# Patient Record
Sex: MALE | Race: WHITE | NOT HISPANIC OR LATINO | Employment: FULL TIME | URBAN - NONMETROPOLITAN AREA
[De-identification: names, ages, dates, MRNs, and addresses within clinical notes are randomized per-mention and may not be internally consistent; named-entity substitution may affect disease eponyms.]

---

## 2017-04-02 ENCOUNTER — APPOINTMENT (EMERGENCY)
Dept: RADIOLOGY | Facility: HOSPITAL | Age: 22
End: 2017-04-02

## 2017-04-02 ENCOUNTER — HOSPITAL ENCOUNTER (EMERGENCY)
Facility: HOSPITAL | Age: 22
Discharge: HOME | End: 2017-04-02
Attending: EMERGENCY MEDICINE | Admitting: EMERGENCY MEDICINE

## 2017-04-02 VITALS
DIASTOLIC BLOOD PRESSURE: 70 MMHG | HEIGHT: 68 IN | HEART RATE: 86 BPM | OXYGEN SATURATION: 98 % | RESPIRATION RATE: 18 BRPM | SYSTOLIC BLOOD PRESSURE: 136 MMHG | TEMPERATURE: 97.9 F | BODY MASS INDEX: 27.28 KG/M2 | WEIGHT: 180 LBS

## 2017-04-02 DIAGNOSIS — R05.9 COUGH: Primary | ICD-10-CM

## 2017-04-02 DIAGNOSIS — J40 BRONCHITIS: ICD-10-CM

## 2017-04-02 PROCEDURE — 99285 EMERGENCY DEPT VISIT HI MDM: CPT

## 2017-04-02 PROCEDURE — 71020 HB CHEST X-RAY 2VW FRONTAL&LATL: CPT

## 2017-04-02 PROCEDURE — 93005 ELECTROCARDIOGRAM TRACING: CPT | Performed by: EMERGENCY MEDICINE

## 2017-04-02 RX ORDER — PROMETHAZINE HYDROCHLORIDE AND CODEINE PHOSPHATE 6.25; 1 MG/5ML; MG/5ML
5 SYRUP ORAL EVERY 4 HOURS PRN
Qty: 120 ML | Refills: 0 | Status: SHIPPED | OUTPATIENT
Start: 2017-04-02 | End: 2017-04-12

## 2017-04-03 LAB
ATRIAL RATE: 78 BPM
P AXIS: 25 DEGREES
PR INTERVAL: 136 MS
QRS AXIS: 4 DEGREES
QRSD INTERVAL: 92 MS
QT INTERVAL: 352 MS
QTC INTERVAL: 401 MS
T WAVE AXIS: 27 DEGREES
VENTRICULAR RATE: 78 BPM

## 2017-05-14 ENCOUNTER — HOSPITAL ENCOUNTER (EMERGENCY)
Facility: HOSPITAL | Age: 22
Discharge: HOME | End: 2017-05-14
Attending: EMERGENCY MEDICINE | Admitting: EMERGENCY MEDICINE

## 2017-05-14 VITALS
HEART RATE: 89 BPM | BODY MASS INDEX: 26.52 KG/M2 | RESPIRATION RATE: 17 BRPM | WEIGHT: 175 LBS | DIASTOLIC BLOOD PRESSURE: 77 MMHG | SYSTOLIC BLOOD PRESSURE: 118 MMHG | TEMPERATURE: 97.8 F | OXYGEN SATURATION: 99 % | HEIGHT: 68 IN

## 2017-05-14 DIAGNOSIS — M54.9 BACK PAIN: Primary | ICD-10-CM

## 2017-05-14 PROCEDURE — 99283 EMERGENCY DEPT VISIT LOW MDM: CPT

## 2017-05-14 PROCEDURE — 96372 THER/PROPH/DIAG INJ SC/IM: CPT

## 2017-05-14 RX ORDER — CYCLOBENZAPRINE HCL 10 MG
5 TABLET ORAL
Qty: 10 TABLET | Refills: 0 | Status: SHIPPED | OUTPATIENT
Start: 2017-05-14 | End: 2018-05-04

## 2017-05-14 RX ORDER — NAPROXEN 500 MG/1
500 TABLET ORAL 2 TIMES DAILY WITH MEALS
Qty: 60 TABLET | Refills: 0 | Status: SHIPPED | OUTPATIENT
Start: 2017-05-14 | End: 2018-05-04

## 2017-05-14 RX ORDER — KETOROLAC TROMETHAMINE 30 MG/ML
30 INJECTION, SOLUTION INTRAMUSCULAR; INTRAVENOUS ONCE
Status: COMPLETED | OUTPATIENT
Start: 2017-05-14 | End: 2017-05-14

## 2017-05-14 RX ADMIN — KETOROLAC TROMETHAMINE 30 MG: 30 INJECTION, SOLUTION INTRAMUSCULAR at 14:24

## 2017-09-01 ENCOUNTER — HOSPITAL ENCOUNTER (EMERGENCY)
Facility: HOSPITAL | Age: 22
Discharge: HOME | End: 2017-09-01
Admitting: EMERGENCY MEDICINE
Payer: COMMERCIAL

## 2017-09-01 VITALS
HEART RATE: 74 BPM | WEIGHT: 185 LBS | TEMPERATURE: 98.4 F | RESPIRATION RATE: 18 BRPM | HEIGHT: 68 IN | SYSTOLIC BLOOD PRESSURE: 134 MMHG | OXYGEN SATURATION: 98 % | BODY MASS INDEX: 28.04 KG/M2 | DIASTOLIC BLOOD PRESSURE: 86 MMHG

## 2017-09-01 DIAGNOSIS — J02.9 PHARYNGITIS, ACUTE: Primary | ICD-10-CM

## 2017-09-01 PROCEDURE — 99282 EMERGENCY DEPT VISIT SF MDM: CPT

## 2017-09-01 RX ORDER — ONDANSETRON 4 MG/1
4 TABLET, ORALLY DISINTEGRATING ORAL EVERY 8 HOURS PRN
Qty: 30 TABLET | Refills: 0 | Status: SHIPPED | OUTPATIENT
Start: 2017-09-01 | End: 2017-12-16 | Stop reason: ALTCHOICE

## 2017-09-01 RX ORDER — AZITHROMYCIN 250 MG/1
500 TABLET, FILM COATED ORAL ONCE
Status: COMPLETED | OUTPATIENT
Start: 2017-09-01 | End: 2017-09-01

## 2017-09-01 RX ORDER — ONDANSETRON 4 MG/1
4 TABLET, ORALLY DISINTEGRATING ORAL ONCE
Status: COMPLETED | OUTPATIENT
Start: 2017-09-01 | End: 2017-09-01

## 2017-09-01 RX ORDER — NAPROXEN 500 MG/1
500 TABLET ORAL 2 TIMES DAILY WITH MEALS
Qty: 10 TABLET | Refills: 0 | Status: SHIPPED | OUTPATIENT
Start: 2017-09-01 | End: 2019-03-04 | Stop reason: ALTCHOICE

## 2017-09-01 RX ORDER — ECHINACEA PURPUREA EXTRACT 125 MG
1 TABLET ORAL ONCE
Status: DISCONTINUED | OUTPATIENT
Start: 2017-09-01 | End: 2017-09-01 | Stop reason: HOSPADM

## 2017-09-01 RX ORDER — DEXAMETHASONE 4 MG/1
10 TABLET ORAL ONCE
Status: COMPLETED | OUTPATIENT
Start: 2017-09-01 | End: 2017-09-01

## 2017-09-01 RX ORDER — FEXOFENADINE HCL 180 MG/1
180 TABLET ORAL DAILY
COMMUNITY
End: 2022-07-25 | Stop reason: ALTCHOICE

## 2017-09-01 RX ORDER — AZITHROMYCIN 250 MG/1
250 TABLET, FILM COATED ORAL EVERY 24 HOURS
Qty: 4 TABLET | Refills: 0 | Status: SHIPPED | OUTPATIENT
Start: 2017-09-01 | End: 2017-09-05

## 2017-09-01 RX ADMIN — AZITHROMYCIN 500 MG: 250 TABLET, FILM COATED ORAL at 03:19

## 2017-09-01 RX ADMIN — ONDANSETRON 4 MG: 4 TABLET, ORALLY DISINTEGRATING ORAL at 02:47

## 2017-09-01 RX ADMIN — DEXAMETHASONE 10 MG: 4 TABLET ORAL at 03:19

## 2017-10-02 ENCOUNTER — ALLSCRIPTS OFFICE VISIT (OUTPATIENT)
Dept: OTHER | Facility: OTHER | Age: 22
End: 2017-10-02

## 2017-10-27 NOTE — PROGRESS NOTES
Assessment  1  Current every day smoker (305 1) (F17 200)   2  Bronchitis (490) (J40)    Plan  Bronchitis    · DrRx Zithromax Z-Luiz 250 MG #6 pill pack; Take 2 pills on day 1, take 1 pill on  days 2-5  Increased BMI    · We recommend that you bring your body mass index down to 26 ; Status:Complete -  Retrospective By Protocol Authorization;   Done: 13QJS0573  Need for prophylactic vaccination and inoculation against influenza    · Stop: Fluzone Quadrivalent Intramuscular Suspension  PMH: Acute recurrent maxillary sinusitis    · Amoxicillin-Pot Clavulanate 875-125 MG Oral Tablet  Screening for depression    · *VB-Depression Screening; Status:Complete - Retrospective By Protocol Authorization;    Done: 47HWV2414 03:58PM    Chief Complaint  1  Cold Symptoms  Amelia Mccoy is here today with a chest cold since yesterday  He complains of coughing up green mucous  Denies fevers/chills, denies sinus congestion  History of Present Illness  HPI: See chief complaint  Review of Systems    Constitutional: no fever-- and-- no chills  ENT: no earache,-- no sore throat-- and-- no nasal discharge  Cardiovascular: no chest pain-- and-- no palpitations  Respiratory: PND, but-- no shortness of breath-- and-- no wheezing--    The patient presents with complaints of cough, described as productive  Gastrointestinal: no abdominal pain,-- no constipation-- and-- no diarrhea  Genitourinary: no dysuria-- and-- no incontinence  Integumentary: no rashes  ROS reviewed  Active Problems  1  Increased BMI (783 9) (R63 8)   2  Need for prophylactic vaccination and inoculation against influenza (V04 81) (Z23)   3  Screening for depression (V79 0) (Z13 89)    Past Medical History  1  History of Acute recurrent maxillary sinusitis (461 0) (J01 01)   2  History of Anxiety attack (300 01) (F41 0)   3  History of acute pharyngitis (V12 69) (Z87 09)   4  History of palpitations (V12 59) (Z87 898)   5   History of reactive airway disease (V12 69) (Z87 09)   6  History of syncope (V15 89) (Z87 898)   7  History of Sinusitis (473 9) (J32 9)   8  History of Tachyarrhythmia (785 0) (R00 0)  Active Problems And Past Medical History Reviewed: The active problems and past medical history were reviewed and updated today  Family History  Mother    1  No pertinent family history  Father    2  No pertinent family history  Family History Reviewed: The family history was reviewed and updated today  Social History   · Current every day smoker (305 1) (F17 200)   · Denied: Drug use (305 90) (F19 90)   · Never Drank Alcohol   · No living will  The social history was reviewed and updated today  The social history was reviewed and is unchanged  Surgical History  1  History of Hip Surgery  Surgical History Reviewed: The surgical history was reviewed and updated today  Current Meds   1  Amoxicillin-Pot Clavulanate 875-125 MG Oral Tablet; TAKE 1 TABLET EVERY 12   HOURS UNTIL GONE;   Therapy: [id number] to (Evaluate:23Oct2016)  Requested for: 48XTF9175; Last   Rx:13Oct2016 Ordered   2  Ibuprofen 200 MG Oral Capsule; Take as directed Recorded   3  Sertraline HCl - 25 MG Oral Tablet; TAKE 1 TABLET DAILY; Therapy: [id number] to (Evaluate:08Apr2017)  Requested for: 13Apr2016; Last   Rx:13Apr2016 Ordered    The medication list was reviewed and updated today  Allergies  1  No Known Drug Allergies    Vitals   Recorded: 83THJ3469 03:56PM   Temperature 10 7 F   Systolic 107, LUE, Sitting   Diastolic 78, LUE, Sitting   Height 5 ft 9 5 in   Weight 200 lb 8 0 oz   BMI Calculated 29 18   BSA Calculated 2 08     Physical Exam    Constitutional   General appearance: No acute distress, well appearing and well nourished  Ears, Nose, Mouth, and Throat   External inspection of ears and nose: Normal     Otoscopic examination: Tympanic membrance translucent with normal light reflex  Canals patent without erythema      Oropharynx: Abnormal   The posterior pharynx was not erythematous,-- was not bulging,-- did not have an exudate-- and-- did not have white patches  -- +PND  Pulmonary   Respiratory effort: No increased work of breathing or signs of respiratory distress  Auscultation of lungs: Abnormal   Auscultation of the lungs revealed expiratory wheezing  Cardiovascular   Auscultation of heart: Normal rate and rhythm, normal S1 and S2, without murmurs  Lymphatic   Palpation of lymph nodes in neck: No lymphadenopathy  Skin   Skin and subcutaneous tissue: Normal without rashes or lesions  Psychiatric   Orientation to person, place and time: Normal     Mood and affect: Normal          Results/Data  *VB-Depression Screening [id number] 03:58PM Lance Huddleston     Test Name Result Flag Reference   Depression Scale Result      Depression Screen - Negative For Symptoms     PHQ-2 Adult Depression Screening 85NFB7847 03:57PM Lilibeth Noe     Test Name Result Flag Reference   PHQ-2 Adult Depression Score 0     Over the last two weeks, how often have you been bothered by any of the following problems?   Little interest or pleasure in doing things: Not at all - 0  Feeling down, depressed, or hopeless: Not at all - 0   PHQ-2 Adult Depression Screening Negative         Signatures   Electronically signed by : Valeria Espinoza, [facility]; Oct  2 2017  4:26PM EST                       (Author)    Electronically signed by : Gaurav Jacobo DO; Oct  2 2017  5:29PM EST                       (Author)

## 2017-11-17 ENCOUNTER — HOSPITAL ENCOUNTER (EMERGENCY)
Facility: HOSPITAL | Age: 22
Discharge: HOME | End: 2017-11-17
Admitting: EMERGENCY MEDICINE
Payer: COMMERCIAL

## 2017-11-17 VITALS
BODY MASS INDEX: 29.55 KG/M2 | RESPIRATION RATE: 20 BRPM | DIASTOLIC BLOOD PRESSURE: 83 MMHG | WEIGHT: 195 LBS | TEMPERATURE: 98.6 F | SYSTOLIC BLOOD PRESSURE: 117 MMHG | HEIGHT: 68 IN | HEART RATE: 88 BPM | OXYGEN SATURATION: 100 %

## 2017-11-17 DIAGNOSIS — S39.012A STRAIN OF LUMBAR REGION, INITIAL ENCOUNTER: Primary | ICD-10-CM

## 2017-11-17 PROCEDURE — 96372 THER/PROPH/DIAG INJ SC/IM: CPT

## 2017-11-17 PROCEDURE — 99283 EMERGENCY DEPT VISIT LOW MDM: CPT

## 2017-11-17 RX ORDER — NAPROXEN 500 MG/1
500 TABLET ORAL 2 TIMES DAILY WITH MEALS
Qty: 14 TABLET | Refills: 0 | Status: SHIPPED | OUTPATIENT
Start: 2017-11-17 | End: 2018-05-04

## 2017-11-17 RX ORDER — KETOROLAC TROMETHAMINE 30 MG/ML
30 INJECTION, SOLUTION INTRAMUSCULAR; INTRAVENOUS ONCE
Status: COMPLETED | OUTPATIENT
Start: 2017-11-17 | End: 2017-11-17

## 2017-11-17 RX ORDER — METHOCARBAMOL 500 MG/1
1000 TABLET, FILM COATED ORAL 3 TIMES DAILY
Qty: 30 TABLET | Refills: 0 | Status: SHIPPED | OUTPATIENT
Start: 2017-11-17 | End: 2018-05-04

## 2017-11-17 RX ADMIN — KETOROLAC TROMETHAMINE 30 MG: 30 INJECTION, SOLUTION INTRAMUSCULAR at 12:12

## 2017-11-17 NOTE — DISCHARGE INSTRUCTIONS
Low Back Strain, Ambulatory Care   GENERAL INFORMATION:   Low back strain  is an injury to your lower back muscles or tendons  Tendons are strong tissues that connect muscles to bones  The lower back supports most of your body weight and helps you move, twist, and bend  Low back strain is usually caused by activities that increase stress on the lower back, such as exercise or injury  Common symptoms include the following:   · Low back pain or muscle spasms    · Stiffness or limited movement    · Pain that goes down to the buttocks, groin, or legs    · Pain that is worse with activity  Seek immediate care for the following symptoms:   · A pop in your lower back    · Increased swelling or pain in your lower back    · Trouble moving your legs    · Numbness in your legs  Treatment for low back strain:   · NSAIDs  help decrease swelling and pain or fever  This medicine is available with or without a doctor's order  NSAIDs can cause stomach bleeding or kidney problems in certain people  If you take blood thinner medicine, always ask your healthcare provider if NSAIDs are safe for you  Always read the medicine label and follow directions  · Muscle relaxers  help decrease muscle spasms pain  · Prescription pain medicine  may be given  Ask how to take this medicine safely  Manage your symptoms:   · Rest  in bed after your injury  Slowly start to increase your activity as the pain decreases, or as directed  · Apply ice  on your lower back for 15 to 20 minutes every hour or as directed  Use an ice pack, or put crushed ice in a plastic bag  Cover it with a towel  Ice helps prevent tissue damage and decreases swelling and pain  You can alternate ice and heat  · Apply heat  on your lower back for 20 to 30 minutes every 2 hours for as many days as directed  Heat helps decrease pain and muscle spasms  Prevent another low back strain:   · Use proper body mechanics        ¨ Bend at the hips and knees when you  objects  Do not bend from the waist  Use your leg muscles as you lift the load  Do not use your back  Keep the object close to your chest as you lift it  Try not to twist or lift anything above your waist     ¨ Change your position often when you stand for long periods of time  Rest one foot on a small box or footrest, and then switch to the other foot often  ¨ Try not to sit for long periods of time  When you do, sit in a straight-backed chair with your feet flat on the floor  Never reach, pull, or push while you are sitting  · Exercise regularly  Warm up before you exercise  Do exercises that strengthen your back muscles  Ask about the best exercise plan for you  · Maintain a healthy weight  Ask your healthcare provider how much you should weigh  Ask him to help you create a weight loss plan if you are overweight  Follow up with your healthcare provider as directed:  Write down your questions so you remember to ask them during your visits  CARE AGREEMENT:   You have the right to help plan your care  Learn about your health condition and how it may be treated  Discuss treatment options with your caregivers to decide what care you want to receive  You always have the right to refuse treatment  The above information is an  only  It is not intended as medical advice for individual conditions or treatments  Talk to your doctor, nurse or pharmacist before following any medical regimen to see if it is safe and effective for you  © 2014 [address] is for End User's use only and may not be sold, redistributed or otherwise used for commercial purposes  All illustrations and images included in CareNotes® are the copyrighted property of [employer]  or [address]

## 2017-11-17 NOTE — ED PROVIDER NOTES
History  Chief Complaint   Patient presents with    Back Pain     Back pain  became worse this AM  History of snow board accident  Stated pain goes down right  leg to knee  Stated leg was giving out  History provided by:  Patient and medical records  Back Pain   Location:  Lumbar spine  Quality:  Aching (aching, spasming)  Radiates to:  R posterior upper leg  Pain severity:  Moderate  Pain is: Worse during the day  Duration: daily back pain x 3 years  today bent forward to tie his boots before going to work and felt a spasm and pain is worse   Timing:  Constant  Progression:  Unchanged  Chronicity:  Chronic  Context comment:  Pt reports 4-5 years ago being hit by a snowboard and ever since then has daily back pain  does manual labor as well  pain is always same spot- lumbar muscles  Relieved by: improved with rest, laying down, motrin  Worsened by:  Bending and ambulation  Ineffective treatments:  None tried  Associated symptoms: tingling (right lateral thigh- on and off for months)    Associated symptoms: no abdominal pain, no bladder incontinence, no bowel incontinence, no chest pain, no dysuria, no fever, no headaches, no leg pain, no numbness, no paresthesias, no perianal numbness, no weakness and no weight loss    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: no hx of osteoporosis, no lack of exercise and not obese        Prior to Admission Medications   Prescriptions Last Dose Informant Patient Reported? Taking?    cyclobenzaprine (FLEXERIL) 10 mg tablet   No No   Sig: Take 0 5 tablets by mouth daily at bedtime as needed for muscle spasms for up to 10 days   fexofenadine (ALLEGRA) 180 MG tablet   Yes No   Sig: Take 180 mg by mouth daily   naproxen (NAPROSYN) 500 mg tablet   No No   Sig: Take 1 tablet by mouth 2 (two) times a day with meals for 30 days   naproxen (NAPROSYN) 500 mg tablet   No No   Sig: Take 1 tablet by mouth 2 (two) times a day with meals for 5 days   ondansetron (ZOFRAN-ODT) 4 mg disintegrating tablet   No No Sig: Take 1 tablet by mouth every 8 (eight) hours as needed for nausea or vomiting for up to 30 doses   sertraline (ZOLOFT) 25 mg tablet   Yes Yes   Sig: Sertraline HCl - 25 MG Oral Tablet TAKE 1 TABLET DAILY  Quantity: 1;  Refills: 3    Gasper Marcus DO;  Started 18-Feb-2015 [id number] Tablet Bottle      Facility-Administered Medications: None       Past Medical History:   Diagnosis Date    Back complaints     Psychiatric disorder        Past Surgical History:   Procedure Laterality Date    HIP SURGERY Right     HIP SURGERY Right     metal strap  History reviewed  No pertinent family history  I have reviewed and agree with the history as documented  Social History   Substance Use Topics    Smoking status: Former Smoker     Packs/day: 0 20     Years: 15 00     Types: Cigarettes    Smokeless tobacco: Current User     Types: Chew    Alcohol use No      Comment: WEEKENDS- social        Review of Systems   Constitutional: Negative for appetite change, chills, diaphoresis, fatigue, fever, unexpected weight change and weight loss  HENT: Negative for congestion, ear pain, facial swelling, hearing loss, rhinorrhea, sinus pressure, sore throat and tinnitus  Eyes: Negative for photophobia, pain, redness, itching and visual disturbance  Respiratory: Negative for cough, chest tightness and wheezing  Cardiovascular: Negative for chest pain, palpitations and leg swelling  Gastrointestinal: Negative for abdominal pain, bowel incontinence, constipation, diarrhea, nausea and vomiting  Genitourinary: Negative for bladder incontinence, dysuria, flank pain, frequency, hematuria, testicular pain and urgency  Musculoskeletal: Positive for back pain  Negative for arthralgias, gait problem, joint swelling and myalgias  Skin: Negative for rash and wound  Neurological: Positive for tingling (right lateral thigh- on and off for months)   Negative for dizziness, tremors, syncope, weakness, numbness, headaches and paresthesias  Physical Exam  ED Triage Vitals [11/17/17 1131]   Temperature Pulse Respirations Blood Pressure SpO2   98 6 °F (37 °C) 88 20 117/83 100 %      Temp Source Heart Rate Source Patient Position - Orthostatic VS BP Location FiO2 (%)   Temporal Monitor Sitting Right arm --      Pain Score       6           Orthostatic Vital Signs  Vitals:    11/17/17 1131   BP: 117/83   Pulse: 88   Patient Position - Orthostatic VS: Sitting       Physical Exam   Constitutional: He is oriented to person, place, and time  He appears well-developed and well-nourished  No distress  HENT:   Head: Normocephalic and atraumatic  Mouth/Throat: Oropharynx is clear and moist    Eyes: Conjunctivae are normal  Pupils are equal, round, and reactive to light  Neck: Normal range of motion  Neck supple  Cardiovascular: Normal rate, regular rhythm and normal heart sounds  Pulmonary/Chest: Effort normal and breath sounds normal  No respiratory distress  He exhibits no tenderness  Abdominal: Soft  Bowel sounds are normal    Neg cvat   Musculoskeletal: Normal range of motion  He exhibits tenderness  He exhibits no edema or deformity  HF/HE KF/KE DF/PF intact  No saddle anesthesia  Sensation intact to light touch bilateral lower extremities  Patellar and achilles reflexes +2 symmetric  Skin pink and warm  DP pulses +2 by palp  Straight leg raise negative bilateral  Tenderness across lumbar musculature and muscle trigger points bilateral    Neurological: He is alert and oriented to person, place, and time  Skin: Skin is warm and dry  Capillary refill takes less than 2 seconds  He is not diaphoretic  Psychiatric: He has a normal mood and affect  Nursing note and vitals reviewed        ED Medications  Medications   ketorolac (TORADOL) 30 mg/mL injection 30 mg (30 mg Intramuscular Given 11/17/17 1212)       Diagnostic Studies  Results Reviewed     None                 No orders to display Procedures  Procedures       Phone Contacts  ED Phone Contact    ED Course  ED Course        MDM  Number of Diagnoses or Management Options  Strain of lumbar region, initial encounter: new and does not require workup     Amount and/or Complexity of Data Reviewed  Review and summarize past medical records: yes (Previous ED records for back pain x 3 since last year)    Patient Progress  Patient progress: stable    CritCare Time    Disposition  Final diagnoses:   Strain of lumbar region, initial encounter     Time reflects when diagnosis was documented in both MDM as applicable and the Disposition within this note     Time User Action Codes Description Comment    11/17/2017 12:05 PM [address] [[id number]] Strain of lumbar region, initial encounter       ED Disposition     ED Disposition Condition Comment    Discharge  Julian Hathaway  discharge to home/self care  Condition at discharge: Good        Follow-up Information     Follow up With Specialties Details Why [address] Lomeli,  Family Medicine Schedule an appointment as soon as possible for a visit in 1 week ER followup [address] [address]  [place] [address] [address] [facility]          Discharge Medication List as of 11/17/2017 12:06 PM      START taking these medications    Details   methocarbamol (ROBAXIN) 500 mg tablet Take 2 tablets by mouth 3 (three) times a day for 5 days, Starting Fri 11/17/2017, Until Wed 11/22/2017, Print         CONTINUE these medications which have CHANGED    Details   naproxen (NAPROSYN) 500 mg tablet Take 1 tablet by mouth 2 (two) times a day with meals for 7 days, Starting Fri 11/17/2017, Until Fri 11/24/2017, Print         CONTINUE these medications which have NOT CHANGED    Details   sertraline (ZOLOFT) 25 mg tablet Sertraline HCl - 25 MG Oral Tablet TAKE 1 TABLET DAILY    Quantity: 1;  Refills: 3    Zahida Roman DO;  Started 18-Feb-2015 [id number] Tablet Bottle, Starting 2/18/2015, Until Discontinued, Historical Med      cyclobenzaprine (FLEXERIL) 10 mg tablet Take 0 5 tablets by mouth daily at bedtime as needed for muscle spasms for up to 10 days, Starting 5/14/2017, Until Wed 5/24/17, Print      fexofenadine (ALLEGRA) 180 MG tablet Take 180 mg by mouth daily, Historical Med      ondansetron (ZOFRAN-ODT) 4 mg disintegrating tablet Take 1 tablet by mouth every 8 (eight) hours as needed for nausea or vomiting for up to 30 doses, Starting Fri 9/1/2017, Print           No discharge procedures on file      ED Provider  Electronically Signed by           Breanne Willis PA-C  11/17/17 7101

## 2017-12-16 ENCOUNTER — HOSPITAL ENCOUNTER (EMERGENCY)
Facility: HOSPITAL | Age: 22
Discharge: HOME | End: 2017-12-16
Attending: EMERGENCY MEDICINE | Admitting: EMERGENCY MEDICINE
Payer: COMMERCIAL

## 2017-12-16 VITALS
RESPIRATION RATE: 18 BRPM | SYSTOLIC BLOOD PRESSURE: 152 MMHG | HEART RATE: 105 BPM | TEMPERATURE: 98 F | WEIGHT: 195.11 LBS | DIASTOLIC BLOOD PRESSURE: 78 MMHG | OXYGEN SATURATION: 99 % | BODY MASS INDEX: 29.67 KG/M2

## 2017-12-16 DIAGNOSIS — R36.0: Primary | ICD-10-CM

## 2017-12-16 LAB
BACTERIA UR QL AUTO: ABNORMAL /HPF
BILIRUB UR QL STRIP: NEGATIVE
CLARITY UR: CLEAR
COLOR UR: YELLOW
GLUCOSE UR STRIP-MCNC: NEGATIVE MG/DL
HGB UR QL STRIP.AUTO: NORMAL
KETONES UR STRIP-MCNC: NEGATIVE MG/DL
LEUKOCYTE ESTERASE UR QL STRIP: NEGATIVE
NITRITE UR QL STRIP: NEGATIVE
NON-SQ EPI CELLS URNS QL MICRO: ABNORMAL /HPF
PH UR STRIP.AUTO: 5.5 [PH] (ref 4.5–8)
PROT UR STRIP-MCNC: NEGATIVE MG/DL
RBC #/AREA URNS AUTO: ABNORMAL /HPF
SP GR UR STRIP.AUTO: >=1.03 (ref 1–1.03)
UROBILINOGEN UR QL STRIP.AUTO: 0.2 E.U./DL
WBC #/AREA URNS AUTO: ABNORMAL /HPF

## 2017-12-16 PROCEDURE — 99283 EMERGENCY DEPT VISIT LOW MDM: CPT

## 2017-12-16 PROCEDURE — 81001 URINALYSIS AUTO W/SCOPE: CPT | Performed by: EMERGENCY MEDICINE

## 2017-12-16 PROCEDURE — 87591 N.GONORRHOEAE DNA AMP PROB: CPT | Performed by: EMERGENCY MEDICINE

## 2017-12-16 PROCEDURE — 87491 CHLMYD TRACH DNA AMP PROBE: CPT | Performed by: EMERGENCY MEDICINE

## 2017-12-16 NOTE — ED PROVIDER NOTES
History  Chief Complaint   Patient presents with    Possible UTI     pt states he noticed a discharge in his underwear at work  was seen by PCP and told he may have a UTI  denies sexual activity     Patient presents from his PCP office for complaint of urethral discharge causing his glans to stick to his underwear for the past week  His PCP estimated that he had a urinary tract infection would need further workup  Patient states he has not had sexual relations for 3 years and denies h/o exposure to STDs  He denies prior h/o urethral discharge or similar symptoms  He has had no hematuria or dysuria  He has no pelvic, inguinal, scrotal or flank pain  No h/o ureteral stones  He complains of urinary hesitancy and states that he has to push through the dried discharge to initiate his stream, then only urinates a small amount  No recent travel or similar sick contacts  Denies f/c, HA, CP, SOB, abdominal pain, n/v/d  12 system ROS o/w negative  History provided by:  Patient and medical records  Urinary Frequency   Location:  Urethral meatus  Quality:  Sticky  Severity:  Mild  Onset quality:  Unable to specify  Duration:  1 week  Timing:  Intermittent  Progression:  Unable to specify  Chronicity:  New  Relieved by:  Nothing tried  Ineffective treatments:  None tried  Associated symptoms: no abdominal pain, no chest pain, no cough, no diarrhea, no fever, no headaches, no myalgias, no nausea, no rash, no rhinorrhea, no shortness of breath, no sore throat, no vomiting and no wheezing    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:  None noted      Prior to Admission Medications   Prescriptions Last Dose Informant Patient Reported? Taking?    cyclobenzaprine (FLEXERIL) 10 mg tablet   No Yes   Sig: Take 0 5 tablets by mouth daily at bedtime as needed for muscle spasms for up to 10 days   fexofenadine (ALLEGRA) 180 MG tablet   Yes Yes   Sig: Take 180 mg by mouth daily   methocarbamol (ROBAXIN) 500 mg tablet   No No   Sig: Take 2 tablets by mouth 3 (three) times a day for 5 days   naproxen (NAPROSYN) 500 mg tablet   No No   Sig: Take 1 tablet by mouth 2 (two) times a day with meals for 30 days   naproxen (NAPROSYN) 500 mg tablet   No No   Sig: Take 1 tablet by mouth 2 (two) times a day with meals for 5 days   naproxen (NAPROSYN) 500 mg tablet   No No   Sig: Take 1 tablet by mouth 2 (two) times a day with meals for 7 days   sertraline (ZOLOFT) 25 mg tablet   Yes Yes   Sig: Sertraline HCl - 25 MG Oral Tablet TAKE 1 TABLET DAILY  Quantity: 1;  Refills: 3    Balwinder Estes DO;  Started 18-Feb-2015 [id number] Tablet Bottle      Facility-Administered Medications: None       Past Medical History:   Diagnosis Date    Back complaints     Psychiatric disorder        Past Surgical History:   Procedure Laterality Date    HIP SURGERY Right     HIP SURGERY Right     metal strap  History reviewed  No pertinent family history  I have reviewed and agree with the history as documented  Social History   Substance Use Topics    Smoking status: Current Some Day Smoker     Packs/day: 0 20     Years: 15 00     Types: Cigarettes    Smokeless tobacco: Current User     Types: Chew    Alcohol use No      Comment: WEEKENDS- social        Review of Systems   Constitutional: Negative for chills, diaphoresis and fever  HENT: Negative for rhinorrhea, sore throat and trouble swallowing  Respiratory: Negative for cough, chest tightness, shortness of breath and wheezing  Cardiovascular: Negative for chest pain, palpitations and leg swelling  Gastrointestinal: Negative for abdominal distention, abdominal pain, constipation, diarrhea, nausea and vomiting  Genitourinary: Positive for decreased urine volume, difficulty urinating, discharge and frequency  Negative for dysuria, enuresis, flank pain, genital sores, hematuria, penile pain, penile swelling, scrotal swelling, testicular pain and urgency     Musculoskeletal: Negative for arthralgias, back pain, myalgias, neck pain and neck stiffness  Skin: Negative for pallor and rash  Neurological: Negative for dizziness, weakness, light-headedness and headaches  Hematological: Negative for adenopathy  Psychiatric/Behavioral: Negative for confusion  The patient is not nervous/anxious  All other systems reviewed and are negative  Physical Exam  ED Triage Vitals   Temperature Pulse Respirations Blood Pressure SpO2   12/16/17 1313 12/16/17 1313 12/16/17 1313 12/16/17 1313 12/16/17 1313   98 °F (36 7 °C) 105 18 152/78 99 %      Temp Source Heart Rate Source Patient Position - Orthostatic VS BP Location FiO2 (%)   12/16/17 1313 12/16/17 1313 12/16/17 1313 12/16/17 1313 --   Temporal Monitor Sitting Right arm       Pain Score       12/16/17 1314       No Pain           Orthostatic Vital Signs  Vitals:    12/16/17 1313   BP: 152/78   Pulse: 105   Patient Position - Orthostatic VS: Sitting       Physical Exam   Constitutional: He is oriented to person, place, and time  He appears well-developed and well-nourished  No distress  HENT:   Head: Normocephalic  Mouth/Throat: Oropharynx is clear and moist    Eyes: EOM are normal  Pupils are equal, round, and reactive to light  Neck: Normal range of motion  Neck supple  Cardiovascular: Normal rate and regular rhythm  No murmur heard  Pulmonary/Chest: Effort normal and breath sounds normal    Abdominal: Soft  Bowel sounds are normal  He exhibits no distension  There is no tenderness  There is no rebound and no guarding  Genitourinary: Penis normal  No penile tenderness  Genitourinary Comments: Normal scrotum and contents  No urethral discharge noted  No discharge or standing in the underwear  Patient is circumcised  No rashes or ulcers on his penis  Musculoskeletal: Normal range of motion  He exhibits no edema or tenderness  Lymphadenopathy:     He has no cervical adenopathy  Neurological: He is alert and oriented to person, place, and time   He has normal reflexes  No cranial nerve deficit  Skin: Skin is warm and dry  Capillary refill takes less than 2 seconds  No rash noted  He is not diaphoretic  No erythema  No pallor  Psychiatric: He has a normal mood and affect  His behavior is normal  Thought content normal    Vitals reviewed  ED Medications  Medications - No data to display    Diagnostic Studies  Results Reviewed     Procedure Component Value Units Date/Time    Urine Microscopic [[id number]]  (Abnormal) Collected:  12/16/17 1352    Lab Status:  Final result Specimen:  Urine from Urine, Clean Catch Updated:  12/16/17 1408     RBC, UA 1-2 (A) /hpf      WBC, UA 2-4 (A) /hpf      Epithelial Cells Occasional /hpf      Bacteria, UA Occasional /hpf     UA w Reflex to Microscopic w Reflex to Culture [[id number]]  (Normal) Collected:  12/16/17 1352    Lab Status:  Final result Specimen:  Urine from Urine, Clean Catch Updated:  12/16/17 1357     Color, UA Yellow     Clarity, UA Clear     Specific Gravity, UA >=1 030     pH, UA 5 5     Leukocytes, UA Negative     Nitrite, UA Negative     Protein, UA Negative mg/dl      Glucose, UA Negative mg/dl      Ketones, UA Negative mg/dl      Urobilinogen, UA 0 2 E U /dl      Bilirubin, UA Negative     Blood, UA Trace-Intact    Chlamydia/GC amplified DNA by PCR [[id number]] Collected:  12/16/17 1352    Lab Status: In process Specimen:  Urine from Urine, Other Updated:  12/16/17 1354                 No orders to display              Procedures  Procedures       Phone Contacts  ED Phone Contact    ED Course  ED Course as of Dec 16 1419   Sat Dec 16, 2017   1412 Results reviewed with patient  Recommend supportive treatment at home and follow up with Urology  MDM  Number of Diagnoses or Management Options  Diagnosis management comments: DDx:  Urethral discharge - atypical UTI, STD, atypical ureteral stone, doubt urinary retention or other bladder pathology      A/P: Will check urine, treat symptoms, reevaluate for further work up and disposition  Amount and/or Complexity of Data Reviewed  Clinical lab tests: ordered and reviewed  Review and summarize past medical records: yes      CritCare Time    Disposition  Final diagnoses:   Urethral discharge in male, without blood     Time reflects when diagnosis was documented in both MDM as applicable and the Disposition within this note     Time User Action Codes Description Comment    12/16/2017  2:17 PM [address] [place] [address]  [address], [facility] [address] [R36 9] Urethral discharge in male, without blood       ED Disposition     ED Disposition Condition Comment    Discharge  Renetta Arts  discharge to home/self care  Condition at discharge: Good        Follow-up Information     Follow up With Specialties Details Why Contact Info    Sylvia Oconnell MD Urology Schedule an appointment as soon as possible for a visit in 2 days If symptoms continue [address]  [address]  [phone]          Patient's Medications   Discharge Prescriptions    No medications on file     No discharge procedures on file      ED Provider  Electronically Signed by           Abby Black DO  12/16/17 2595

## 2017-12-16 NOTE — DISCHARGE INSTRUCTIONS
Nonspecific Urethritis in Men   WHAT YOU NEED TO KNOW:   Nonspecific urethritis is a condition that causes inflammation of the urethra  The urethra is the tube where urine passes from the bladder to the outside of the body  Men who have sex with men and those with multiple sexual partners are at a high risk of having this condition  DISCHARGE INSTRUCTIONS:   Medicines:     · Take your medicine as directed  Contact your healthcare provider if you think your medicine is not helping or if you have side effects  Tell him of her if you are allergic to any medicine  Keep a list of the medicines, vitamins, and herbs you take  Include the amounts, and when and why you take them  Bring the list or the pill bottles to follow-up visits  Carry your medicine list with you in case of an emergency  Follow up with your healthcare provider as directed:  Write down your questions so you remember to ask them during your visits  Manage your symptoms:   · Heat:  Sit in a warm bath for 15 minutes at least 2 times a day  · Do not use chemical irritants: This includes bath soaps, spermicides, or other products that may cause irritation  Prevent nonspecific urethritis:  If your urethritis was caused by an infection, the following may help to prevent the spread:  · Use condoms:  Wear a condom during oral, vaginal, and anal sex  Ask for more information about the correct way to use condoms  · Do not have sex with someone who has urethritis: This includes oral, vaginal, and anal sex  · Do not have sex during treatment:  Do not have sex while you or your partners are being treated  Ask when it is safe to have sex  · Report pregnancy:  Tell your healthcare provider if your female partner is pregnant  You may have spread an infection to her, and she may pass it to her infant during birth  Contact your healthcare provider if:   · You have a fever  · You have chills, a cough, or feel weak and achy      · You continue to have signs or symptoms after being treated for nonspecific urethritis  · You have questions or concerns about your condition or care  Return to the emergency department if:   · You have joint stiffness, muscle pain, or eye inflammation  · You have pain and swelling in your scrotum  · You have severe abdominal pain  · You have chest pain or trouble breathing  © 2017 [address] [employer] Information is for End User's use only and may not be sold, redistributed or otherwise used for commercial purposes  All illustrations and images included in CareNotes® are the copyrighted property of [employer] , [employer]  or Sergio Jones  The above information is an  only  It is not intended as medical advice for individual conditions or treatments  Talk to your doctor, nurse or pharmacist before following any medical regimen to see if it is safe and effective for you

## 2017-12-18 LAB
CHLAMYDIA DNA CVX QL NAA+PROBE: NORMAL
N GONORRHOEA DNA GENITAL QL NAA+PROBE: NORMAL

## 2018-01-08 ENCOUNTER — OFFICE VISIT (OUTPATIENT)
Dept: URGENT CARE | Facility: CLINIC | Age: 23
End: 2018-01-08
Payer: COMMERCIAL

## 2018-01-08 PROCEDURE — 99203 OFFICE O/P NEW LOW 30 MIN: CPT

## 2018-01-09 NOTE — PROGRESS NOTES
Assessment   1  Acute sinusitis (461 9) (J01 90)    Plan   Acute sinusitis    · Amoxicillin-Pot Clavulanate 875-125 MG Oral Tablet; TAKE 1 TABLET EVERY 12    HOURS DAILY    Discussion/Summary   Discussion Summary: To follow up with PCP in 3-5 days if no improvement in his symptoms  Medication Side Effects Reviewed: Possible side effects of new medications were reviewed with the patient/guardian today  Understands and agrees with treatment plan: The treatment plan was reviewed with the patient/guardian  The patient/guardian understands and agrees with the treatment plan    Counseling Documentation With Imm: The patient was counseled regarding prognosis,-- risks and benefits of treatment options,-- importance of compliance with treatment  Follow Up Instructions: Follow Up with your Primary Care Provider in 3-5 days  If your symptoms worsen, go to the nearest [place] [address] Emergency Department  Chief Complaint   1  Cold Symptoms  Chief Complaint Free Text Note Form: C/o sinus pressure headaches and productive cough congestion for 2-3 weeks  History of Present Illness   HPI: 22 y/o C/o sinus pressure headaches and productive cough congestion for 2-3 weeks  Hospital Based Practices Required Assessment:      Pain Assessment      the patient states they do not have pain  (on a scale of 0 to 10, the patient rates the pain at 0 )      Abuse And Domestic Violence Screen       Yes, the patient is safe at home  -- The patient states no one is hurting them  Depression And Suicide Screen  No, the patient has not had thoughts of hurting themself  No, the patient has not felt depressed in the past 7 days  Prefered Language is  [place]  Primary Language is  English  Cold Symptoms: Kelly Medina presents with complaints of cold symptoms  Associated symptoms include productive cough,-- facial pressure-- and-- headache        Review of Systems   Focused-Male:      Constitutional: no fever or chills, feels well, no tiredness, no recent weight loss or weight gain  ENT: as noted in HPI  Cardiovascular: no complaints of slow or fast heart rate, no chest pain, no palpitations, no leg claudication or lower extremity edema  Respiratory: as noted in HPI  Gastrointestinal: no complaints of abdominal pain, no constipation, no nausea or vomiting, no diarrhea or bloody stools  Genitourinary: no complaints of dysuria or incontinence, no hesitancy, no nocturia, no genital lesion, no inadequacy of penile erection  Musculoskeletal: no complaints of arthralgia, no myalgia, no joint swelling or stiffness, no limb pain or swelling  Integumentary: no complaints of skin rash or lesion, no itching or dry skin, no skin wounds  Neurological: no complaints of headache, no confusion, no numbness or tingling, no dizziness or fainting  ROS Reviewed:    ROS reviewed  Active Problems   1  Bronchitis (490) (J40)   2  Increased BMI (783 9) (R63 8)   3  Screening for depression (V79 0) (Z13 89)    Past Medical History   1  History of Acute recurrent maxillary sinusitis (461 0) (J01 01)   2  History of Anxiety attack (300 01) (F41 0)   3  History of acute pharyngitis (V12 69) (Z87 09)   4  History of palpitations (V12 59) (Z87 898)   5  History of reactive airway disease (V12 69) (Z87 09)   6  History of syncope (V15 89) (Z87 898)   7  Need for prophylactic vaccination and inoculation against influenza (V04 81) (Z23)   8  History of Sinusitis (473 9) (J32 9)   9  History of Tachyarrhythmia (785 0) (R00 0)  Active Problems And Past Medical History Reviewed: The active problems and past medical history were reviewed and updated today  Family History   Mother    1  No pertinent family history  Father    2  No pertinent family history  Family History Reviewed: The family history was reviewed and updated today         Social History    · Current every day smoker (305 1) (F17 200)   · Denied: Drug use (305 90) (F19 90)   · Never Drank Alcohol   · No living will  Social History Reviewed: The social history was reviewed and updated today  Surgical History   1  History of Hip Surgery  Surgical History Reviewed: The surgical history was reviewed and updated today  Current Meds    1  Ibuprofen 200 MG Oral Capsule; Take as directed Recorded   2  Sertraline HCl - 25 MG Oral Tablet; TAKE 1 TABLET DAILY; Therapy: [id number] to (Evaluate:08Apr2017)  Requested for: 13Apr2016; Last     Rx:13Apr2016 Ordered  Medication List Reviewed: The medication list was reviewed and updated today  Allergies   1  No Known Drug Allergies    Vitals   Signs   Recorded: 03NSZ8766 12:22PM   Temperature: 98 3 F  Heart Rate: 76  Respiration: 20  Systolic: 842, RUE, Sitting  Diastolic: 72, RUE, Sitting  BP Comments: Arnold  O2 Saturation: 98    Physical Exam        Constitutional      General appearance: No acute distress, well appearing and well nourished  Eyes      Conjunctiva and lids: No swelling, erythema, or discharge  Pupils and irises: Equal, round and reactive to light  Ears, Nose, Mouth, and Throat      External inspection of ears and nose: Normal        Otoscopic examination: Tympanic membrance translucent with normal light reflex  Canals patent without erythema  Nasal mucosa, septum, and turbinates: Abnormal   The bilateral nasal mucosa was crusted  Oropharynx: Abnormal   The posterior pharynx was erythematous, but-- did not have an exudate  Pulmonary      Respiratory effort: No increased work of breathing or signs of respiratory distress  Auscultation of lungs: Clear to auscultation  Cardiovascular      Palpation of heart: Normal PMI, no thrills  Auscultation of heart: Normal rate and rhythm, normal S1 and S2, without murmurs  Lymphatic      Palpation of lymph nodes in neck: No lymphadenopathy         Musculoskeletal Gait and station: Normal        Digits and nails: Normal without clubbing or cyanosis  Skin      Skin and subcutaneous tissue: Normal without rashes or lesions  Psychiatric      Orientation to person, place and time: Normal        Mood and affect: Normal        Additional Exam:  tenderness over palpation of frontal sinuses        Signatures    Electronically signed by : TIFFANY Al; Jan 8 2018 12:37PM EST                       (Author)     Electronically signed by : MARY GRACE Reed ; Jan 8 2018 12:50PM EST                       (Co-author)

## 2018-01-10 NOTE — PROGRESS NOTES
Assessment    1  Encounter for preventive health examination (V70 0) (Z00 00)    Discussion/Summary  Impression: health maintenance visit, healthy adult male  Currently, he eats a healthy diet and eats an adequate diet  Testicular cancer screening: the risks and benefits of testicular cancer screening were discussed and self testicular exam technique was taught  Colorectal cancer screening: colorectal cancer screening is not indicated  Advice and education were given regarding nutrition, aerobic exercise, weight bearing exercise, weight loss, calcium supplements, vitamin D supplements, reproductive health, cardiovascular risk reduction, tobacco cessation, alcohol use, sunscreen use, self skin examination, helmet use, seat belt use, fall risk reduction and advanced directive planning  Patient discussion: discussed with the patient  History of Present Illness  HM, Adult Male: The patient is being seen for a health maintenance evaluation  General Health: The patient's health since the last visit is described as good  He has regular dental visits  He denies vision problems  He denies hearing loss  Immunizations status: up to date  Lifestyle:  He consumes a diverse and healthy diet  He does not have any weight concerns  He exercises regularly  He does not use tobacco  He denies alcohol use  He denies drug use  Reproductive health:  the patient is sexually active  birth control is not being practiced  He denies erectile dysfunction  Screening: cancer screening reviewed and current  metabolic screening reviewed and current  risk screening reviewed and current  Review of Systems    Constitutional: No fever or chills, feels well, no tiredness, no recent weight gain or weight loss  Eyes: No complaints of eye pain, no red eyes, no discharge from eyes, no itchy eyes  ENT: no complaints of earache, no hearing loss, no nosebleeds, no nasal discharge, no sore throat, no hoarseness     Cardiovascular: No complaints of slow heart rate, no fast heart rate, no chest pain, no palpitations, no leg claudication, no lower extremity  Respiratory: No complaints of shortness of breath, no wheezing, no cough, no SOB on exertion, no orthopnea or PND  Gastrointestinal: No complaints of abdominal pain, no constipation, no nausea or vomiting, no diarrhea or bloody stools  Genitourinary: No complaints of dysuria, no incontinence, no hesitancy, no nocturia, no genital lesion, no testicular pain  Musculoskeletal: No complaints of arthralgia, no myalgias, no joint swelling or stiffness, no limb pain or swelling  Integumentary: No complaints of skin rash or skin lesions, no itching, no skin wound, no dry skin  Neurological: No compliants of headache, no confusion, no convulsions, no numbness or tingling, no dizziness or fainting, no limb weakness, no difficulty walking  Psychiatric: Is not suicidal, no sleep disturbances, no anxiety or depression, no change in personality, no emotional problems  Endocrine: No complaints of proptosis, no hot flashes, no muscle weakness, no erectile dysfunction, no deepening of the voice, no feelings of weakness  Hematologic/Lymphatic: No complaints of swollen glands, no swollen glands in the neck, does not bleed easily, no easy bruising  Active Problems    1  Acute pharyngitis (462) (J02 9)   2  Anxiety attack (300 01) (F41 0)   3  Palpitations (785 1) (R00 2)   4  Reactive airway disease (493 90) (J45 909)   5  Tachyarrhythmia (785 0) (R00 0)    Past Medical History    · History of Sinusitis (473 9) (J32 9)    Surgical History    · History of Hip Surgery    Family History    · No pertinent family history    · No pertinent family history    Social History    · Current every day smoker (305 1) (F17 200)   · Denied: Drug use (305 90) (F19 90)   · Never Drank Alcohol    Current Meds   1  Sertraline HCl - 25 MG Oral Tablet; TAKE 1 TABLET DAILY;    Therapy: [id number] to (Evaluate:18Jun2015)  Requested for: [id number]; Last   Rx:18Feb2015 Ordered    Allergies    1  No Known Drug Allergies    Vitals   Recorded: 13Apr2016 03:12PM   Heart Rate 90   Systolic 449, LUE, Sitting   Diastolic 72, LUE, Sitting   Height 5 ft 9 5 in   Weight 173 lb 4 00 oz   BMI Calculated 25 22   BSA Calculated 1 95     Physical Exam    Constitutional   General appearance: No acute distress, well appearing and well nourished  Eyes   Conjunctiva and lids: No swelling, erythema, or discharge  Pupils and irises: Equal, round and reactive to light  Ears, Nose, Mouth, and Throat   External inspection of ears and nose: Normal     Otoscopic examination: Tympanic membrance translucent with normal light reflex  Canals patent without erythema  Oropharynx: Normal with no erythema, edema, exudate or lesions  Pulmonary   Respiratory effort: No increased work of breathing or signs of respiratory distress  Auscultation of lungs: Clear to auscultation  Cardiovascular   Palpation of heart: Normal PMI, no thrills  Auscultation of heart: Normal rate and rhythm, normal S1 and S2, without murmurs  Examination of extremities for edema and/or varicosities: Normal     Abdomen   Abdomen: Non-tender, no masses  Liver and spleen: No hepatomegaly or splenomegaly  Lymphatic   Palpation of lymph nodes in neck: No lymphadenopathy  Musculoskeletal   Gait and station: Normal     Digits and nails: Normal without clubbing or cyanosis  Inspection/palpation of joints, bones, and muscles: Normal     Skin   Skin and subcutaneous tissue: Normal without rashes or lesions  Neurologic   Cranial nerves: Cranial nerves 2-12 intact  Reflexes: 2+ and symmetric  Sensation: No sensory loss  Psychiatric   Orientation to person, place and time: Normal     Mood and affect: Normal        Procedure    Procedure: Hearing Acuity Test    Audiometry: Normal bilaterally  The patient Tolerated the procedure well  There were no complications  Procedure: Visual Acuity Test    Results: normal in both eyes  The patient tolerated the procedure well  There were no complications  Signatures   Electronically signed by : TIFFANY Francis; Apr 13 2016  3:34PM EST                       (Author)    Electronically signed by : Maxine Campbell DO;  Apr 13 2016  3:49PM EST                       (Author)

## 2018-01-13 VITALS
DIASTOLIC BLOOD PRESSURE: 78 MMHG | WEIGHT: 200.5 LBS | TEMPERATURE: 98.5 F | BODY MASS INDEX: 28.71 KG/M2 | HEIGHT: 70 IN | SYSTOLIC BLOOD PRESSURE: 118 MMHG

## 2018-01-23 VITALS
TEMPERATURE: 98.3 F | DIASTOLIC BLOOD PRESSURE: 72 MMHG | OXYGEN SATURATION: 98 % | HEART RATE: 76 BPM | RESPIRATION RATE: 20 BRPM | SYSTOLIC BLOOD PRESSURE: 122 MMHG

## 2018-01-25 RX ORDER — SERTRALINE HYDROCHLORIDE 25 MG/1
1 TABLET, FILM COATED ORAL DAILY
COMMUNITY
Start: 2015-02-18 | End: 2018-10-25 | Stop reason: SDUPTHER

## 2018-05-04 ENCOUNTER — TRANSCRIBE ORDERS (OUTPATIENT)
Dept: RADIOLOGY | Facility: MEDICAL CENTER | Age: 23
End: 2018-05-04

## 2018-05-04 ENCOUNTER — OFFICE VISIT (OUTPATIENT)
Dept: FAMILY MEDICINE CLINIC | Facility: CLINIC | Age: 23
End: 2018-05-04
Payer: COMMERCIAL

## 2018-05-04 ENCOUNTER — APPOINTMENT (OUTPATIENT)
Dept: RADIOLOGY | Facility: MEDICAL CENTER | Age: 23
End: 2018-05-04
Payer: COMMERCIAL

## 2018-05-04 VITALS
HEIGHT: 68 IN | DIASTOLIC BLOOD PRESSURE: 74 MMHG | SYSTOLIC BLOOD PRESSURE: 114 MMHG | WEIGHT: 190.6 LBS | BODY MASS INDEX: 28.89 KG/M2

## 2018-05-04 DIAGNOSIS — M54.32 BILATERAL SCIATICA: ICD-10-CM

## 2018-05-04 DIAGNOSIS — M54.31 BILATERAL SCIATICA: ICD-10-CM

## 2018-05-04 DIAGNOSIS — M54.50 LUMBAR PAIN: Primary | ICD-10-CM

## 2018-05-04 DIAGNOSIS — M54.50 LUMBAR PAIN: ICD-10-CM

## 2018-05-04 PROCEDURE — 99213 OFFICE O/P EST LOW 20 MIN: CPT | Performed by: PHYSICIAN ASSISTANT

## 2018-05-04 PROCEDURE — 3008F BODY MASS INDEX DOCD: CPT | Performed by: PHYSICIAN ASSISTANT

## 2018-05-04 PROCEDURE — 72110 X-RAY EXAM L-2 SPINE 4/>VWS: CPT

## 2018-05-04 NOTE — PROGRESS NOTES
Assessment/Plan:    Will check imaging on Dakota's back  Ultimately, I think he will be needed an MRI of his lumbar spine  Instructed him to increase ibuprofen to 800mg TID PRN with food  Diagnoses and all orders for this visit:    Lumbar pain  -     XR spine lumbar minimum 4 views non injury; Future    Bilateral sciatica  -     XR spine lumbar minimum 4 views non injury; Future          Subjective:      Patient ID: Mercedes Washington  is a 23 y o  male  Back Pain   This is a chronic problem  The current episode started more than 1 year ago  The problem occurs intermittently  The problem has been gradually worsening since onset  The pain is present in the lumbar spine  The quality of the pain is described as shooting and stabbing  The pain radiates to the left foot, left thigh, left knee, right foot, right knee and right thigh  The pain is severe  The symptoms are aggravated by position  Stiffness is present in the morning, at night and all day  Associated symptoms include paresthesias, tingling and weakness  Pertinent negatives include no abdominal pain, bladder incontinence, bowel incontinence, chest pain, dysuria, fever, headaches, leg pain, numbness, paresis, pelvic pain, perianal numbness or weight loss  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: , "bounces" in truck all day  He has tried NSAIDs for the symptoms  The treatment provided mild relief  The following portions of the patient's history were reviewed and updated as appropriate:   He  has a past medical history of Back complaints and Psychiatric disorder  He   Patient Active Problem List    Diagnosis Date Noted    Pharyngitis, acute 09/01/2017     He  has a past surgical history that includes Hip surgery (Right) and Hip surgery (Right)  His family history is not on file  He  reports that he has been smoking Cigarettes  He has a 3 00 pack-year smoking history  His smokeless tobacco use includes Chew   He reports that he does not drink alcohol or use drugs  Current Outpatient Prescriptions   Medication Sig Dispense Refill    fexofenadine (ALLEGRA) 180 MG tablet Take 180 mg by mouth daily      naproxen (NAPROSYN) 500 mg tablet Take 1 tablet by mouth 2 (two) times a day with meals for 5 days 10 tablet 0    sertraline (ZOLOFT) 25 mg tablet Take 1 tablet by mouth daily       No current facility-administered medications for this visit  Current Outpatient Prescriptions on File Prior to Visit   Medication Sig    fexofenadine (ALLEGRA) 180 MG tablet Take 180 mg by mouth daily    naproxen (NAPROSYN) 500 mg tablet Take 1 tablet by mouth 2 (two) times a day with meals for 5 days    sertraline (ZOLOFT) 25 mg tablet Take 1 tablet by mouth daily    [DISCONTINUED] cyclobenzaprine (FLEXERIL) 10 mg tablet Take 0 5 tablets by mouth daily at bedtime as needed for muscle spasms for up to 10 days    [DISCONTINUED] methocarbamol (ROBAXIN) 500 mg tablet Take 2 tablets by mouth 3 (three) times a day for 5 days    [DISCONTINUED] naproxen (NAPROSYN) 500 mg tablet Take 1 tablet by mouth 2 (two) times a day with meals for 30 days    [DISCONTINUED] naproxen (NAPROSYN) 500 mg tablet Take 1 tablet by mouth 2 (two) times a day with meals for 7 days    [DISCONTINUED] sertraline (ZOLOFT) 25 mg tablet Sertraline HCl - 25 MG Oral Tablet TAKE 1 TABLET DAILY  Quantity: 1;  Refills: 3    Arch Emperor DO;  Started 18-Feb-2015 [id number] Tablet Bottle     No current facility-administered medications on file prior to visit  He has No Known Allergies       Review of Systems   Constitutional: Negative for chills, fatigue, fever and weight loss  HENT: Negative for congestion, postnasal drip, rhinorrhea, sinus pressure and sore throat  Respiratory: Negative for cough, shortness of breath and wheezing  Cardiovascular: Negative for chest pain and palpitations  Gastrointestinal: Negative for abdominal pain, bowel incontinence, constipation, diarrhea, nausea and vomiting  Genitourinary: Negative for bladder incontinence, dysuria and pelvic pain  Musculoskeletal: Positive for back pain  Skin: Negative for rash  Neurological: Positive for tingling, weakness and paresthesias  Negative for numbness and headaches  Objective:      /74 (BP Location: Left arm, Patient Position: Sitting)   Ht 5' 8" (1 727 m)   Wt 86 5 kg (190 lb 9 6 oz)   BMI 28 98 kg/m²          Physical Exam   Constitutional: He is oriented to person, place, and time  He appears well-developed and well-nourished  No distress  HENT:   Head: Normocephalic and atraumatic  Right Ear: External ear normal    Left Ear: External ear normal    Mouth/Throat: Oropharynx is clear and moist  No oropharyngeal exudate  Eyes: Conjunctivae are normal  Right eye exhibits no discharge  Left eye exhibits no discharge  No scleral icterus  Neck: Neck supple  No thyromegaly present  Cardiovascular: Normal rate, regular rhythm and normal heart sounds  Pulmonary/Chest: Effort normal and breath sounds normal  No respiratory distress  He has no wheezes  Musculoskeletal: He exhibits no edema or tenderness  Back:    Neurological: He is alert and oriented to person, place, and time  Skin: Skin is warm and dry  No rash noted  He is not diaphoretic  No erythema  Psychiatric: He has a normal mood and affect  His behavior is normal  Judgment and thought content normal    Vitals reviewed

## 2018-05-04 NOTE — LETTER
May 4, 2018     Patient: Marci Alvarenga  YOB: 1995   Date of Visit: 5/4/2018       To Whom it May Concern:    Sandra Dickerson is under my professional care  He was seen in my office on 5/4/2018  He may return to work on 5/5/18  If you have any questions or concerns, please don't hesitate to call           Sincerely,          Pawel Marr PA-C        CC: No Recipients

## 2018-05-07 DIAGNOSIS — M54.41 CHRONIC LOW BACK PAIN WITH BILATERAL SCIATICA, UNSPECIFIED BACK PAIN LATERALITY: Primary | ICD-10-CM

## 2018-05-07 DIAGNOSIS — G89.29 CHRONIC LOW BACK PAIN WITH BILATERAL SCIATICA, UNSPECIFIED BACK PAIN LATERALITY: Primary | ICD-10-CM

## 2018-05-07 DIAGNOSIS — M54.42 CHRONIC LOW BACK PAIN WITH BILATERAL SCIATICA, UNSPECIFIED BACK PAIN LATERALITY: Primary | ICD-10-CM

## 2018-05-18 ENCOUNTER — HOSPITAL ENCOUNTER (EMERGENCY)
Facility: HOSPITAL | Age: 23
Discharge: HOME | End: 2018-05-18
Attending: EMERGENCY MEDICINE | Admitting: EMERGENCY MEDICINE
Payer: COMMERCIAL

## 2018-05-18 VITALS
OXYGEN SATURATION: 98 % | WEIGHT: 191.5 LBS | RESPIRATION RATE: 20 BRPM | SYSTOLIC BLOOD PRESSURE: 122 MMHG | TEMPERATURE: 97.7 F | DIASTOLIC BLOOD PRESSURE: 74 MMHG | BODY MASS INDEX: 29.12 KG/M2 | HEART RATE: 80 BPM

## 2018-05-18 DIAGNOSIS — J01.00 SINUSITIS, ACUTE MAXILLARY: Primary | ICD-10-CM

## 2018-05-18 PROCEDURE — 99283 EMERGENCY DEPT VISIT LOW MDM: CPT

## 2018-05-18 RX ORDER — AMOXICILLIN AND CLAVULANATE POTASSIUM 875; 125 MG/1; MG/1
1 TABLET, FILM COATED ORAL ONCE
Status: COMPLETED | OUTPATIENT
Start: 2018-05-18 | End: 2018-05-18

## 2018-05-18 RX ORDER — AMOXICILLIN AND CLAVULANATE POTASSIUM 875; 125 MG/1; MG/1
1 TABLET, FILM COATED ORAL EVERY 12 HOURS
Qty: 14 TABLET | Refills: 0 | Status: SHIPPED | OUTPATIENT
Start: 2018-05-18 | End: 2018-05-25

## 2018-05-18 RX ORDER — PREDNISONE 20 MG/1
60 TABLET ORAL ONCE
Status: COMPLETED | OUTPATIENT
Start: 2018-05-18 | End: 2018-05-18

## 2018-05-18 RX ORDER — PREDNISONE 20 MG/1
60 TABLET ORAL DAILY
Qty: 12 TABLET | Refills: 0 | Status: SHIPPED | OUTPATIENT
Start: 2018-05-18 | End: 2018-10-25 | Stop reason: ALTCHOICE

## 2018-05-18 RX ADMIN — AMOXICILLIN AND CLAVULANATE POTASSIUM 1 TABLET: 875; 125 TABLET, FILM COATED ORAL at 12:01

## 2018-05-18 RX ADMIN — PREDNISONE 60 MG: 20 TABLET ORAL at 12:01

## 2018-05-18 NOTE — DISCHARGE INSTRUCTIONS

## 2018-05-18 NOTE — ED PROVIDER NOTES
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 he sees no evidence of swelling  He currently denies any neck pain shortness of breath or other concerns  He states that his sinuses have been bothering and right maxillary sinus still does "full"  He has no tongue or lip swelling no evidence of an allergic reaction of any kind        Sinus Problem   Pain details:     Location:  Maxillary    Quality:  Dull    Severity:  Mild    Duration:  1 hour    Timing:  Constant  Chronicity:  New  Context: not allergies and not chemical odor    Relieved by:  Nothing  Worsened by:  Nothing  Ineffective treatments:  None tried  Associated symptoms: congestion    Associated symptoms: no chest pain, no chills, no cough and no headaches        Prior to Admission Medications   Prescriptions Last Dose Informant Patient Reported?  Taking?   fexofenadine (ALLEGRA) 180 MG tablet   Yes No   Sig: Take 180 mg by mouth daily   naproxen (NAPROSYN) 500 mg tablet   No No   Sig: Take 1 tablet by mouth 2 (two) times a day with meals for 5 days   sertraline (ZOLOFT) 25 mg tablet   Yes No   Sig: Take 1 tablet by mouth daily      Facility-Administered Medications: None       Past Medical History:   Diagnosis Date    Anxiety attack     last assessed 02/18/15    Back complaints     Palpitations     last assessed 03/19/15    Psychiatric disorder     Reactive airway disease     last assessed 02/18/15    Syncope     last assessed 09/29/16    Tachyarrhythmia     last assessed 03/19/15       Past Surgical History:   Procedure Laterality Date    HIP SURGERY Right     HIP SURGERY Right 07/17/2014    metal strap  Family History   Problem Relation Age of Onset    No Known Problems Mother     No Known Problems Father      I have reviewed and agree with the history as documented  Social History   Substance Use Topics    Smoking status: Current Some Day Smoker     Packs/day: 0 20     Years: 15 00     Types: Cigarettes    Smokeless tobacco: Current User     Types: Chew    Alcohol use Yes      Comment: WEEKENDS- social        Review of Systems   Constitutional: Negative for chills  HENT: Positive for congestion, postnasal drip, sinus pain and sinus pressure  Eyes: Negative for pain, discharge and itching  Respiratory: Negative for cough  Cardiovascular: Negative for chest pain  Gastrointestinal: Negative for abdominal distention, abdominal pain and anal bleeding  Endocrine: Negative for cold intolerance and heat intolerance  Genitourinary: Negative for difficulty urinating, dysuria and enuresis  Musculoskeletal: Negative for arthralgias, back pain and gait problem  Skin: Negative for color change and pallor  Allergic/Immunologic: Negative for environmental allergies and food allergies  Neurological: Negative for dizziness, facial asymmetry, light-headedness and headaches  Hematological: Negative for adenopathy  Psychiatric/Behavioral: Negative for agitation, behavioral problems and confusion  Physical Exam  Physical Exam   Constitutional: He appears well-developed and well-nourished  HENT:   Head: Normocephalic  Right Ear: External ear normal    Left Ear: External ear normal    No swelling to the right maxillary sinus region  There is tenderness palpation over this region  There is no redness or signs of infection  There is no facial lip or tongue swelling   Patient with no obvious dental swelling   Eyes: Pupils are equal, round, and reactive to light  Neck: No tracheal deviation present  No thyromegaly present  Cardiovascular: Normal rate and regular rhythm  Pulmonary/Chest: Effort normal  No respiratory distress  He has no wheezes  He has no rales  Abdominal: Soft  He exhibits no distension  There is no tenderness  There is no guarding  Musculoskeletal: Normal range of motion  He exhibits no edema or deformity  Neurological: He is alert  He displays normal reflexes  No cranial nerve deficit  Coordination normal    Skin: Skin is warm  Capillary refill takes less than 2 seconds  No erythema  Psychiatric: He has a normal mood and affect  His behavior is normal    Vitals reviewed        Vital Signs  ED Triage Vitals [05/18/18 1152]   Temperature Pulse Respirations Blood Pressure SpO2   97 7 °F (36 5 °C) 80 20 122/74 98 %      Temp Source Heart Rate Source Patient Position - Orthostatic VS BP Location FiO2 (%)   Temporal Monitor Sitting Right arm --      Pain Score       --           Vitals:    05/18/18 1152   BP: 122/74   Pulse: 80   Patient Position - Orthostatic VS: Sitting       Visual Acuity      ED Medications  Medications   predniSONE tablet 60 mg (60 mg Oral Given 5/18/18 1201)   amoxicillin-clavulanate (AUGMENTIN) 875-125 mg per tablet 1 tablet (1 tablet Oral Given 5/18/18 1201)       Diagnostic Studies  Results Reviewed     None                 No orders to display              Procedures  Procedures       Phone Contacts  ED Phone Contact    ED Course                               MDM  CritCare Time    Disposition  Final diagnoses:   Sinusitis, acute maxillary     Time reflects when diagnosis was documented in both MDM as applicable and the Disposition within this note     Time User Action Codes Description Comment    5/18/2018 11:56 AM Ron Maravilla Add [J01 00] Sinusitis, acute maxillary       ED Disposition     ED Disposition Condition Comment    Discharge  Houston Frias Cynthia Ortiz  discharge to home/self care  Condition at discharge: Good        Follow-up Information    None         Patient's Medications   Discharge Prescriptions    AMOXICILLIN-CLAVULANATE (AUGMENTIN) 875-125 MG PER TABLET    Take 1 tablet by mouth every 12 (twelve) hours for 7 days       Start Date: 5/18/2018 End Date: 5/25/2018       Order Dose: 1 tablet       Quantity: 14 tablet    Refills: 0    PREDNISONE 20 MG TABLET    Take 3 tablets (60 mg total) by mouth daily       Start Date: 5/18/2018 End Date: --       Order Dose: 60 mg       Quantity: 12 tablet    Refills: 0     No discharge procedures on file      ED Provider  Electronically Signed by           Mehdi Arriaga DO  05/18/18 [id number]

## 2018-10-25 ENCOUNTER — OFFICE VISIT (OUTPATIENT)
Dept: FAMILY MEDICINE CLINIC | Facility: CLINIC | Age: 23
End: 2018-10-25
Payer: COMMERCIAL

## 2018-10-25 VITALS
HEIGHT: 68 IN | WEIGHT: 198.4 LBS | SYSTOLIC BLOOD PRESSURE: 100 MMHG | BODY MASS INDEX: 30.07 KG/M2 | DIASTOLIC BLOOD PRESSURE: 76 MMHG

## 2018-10-25 DIAGNOSIS — F41.1 GENERALIZED ANXIETY DISORDER: Primary | ICD-10-CM

## 2018-10-25 DIAGNOSIS — Z23 NEED FOR INFLUENZA VACCINATION: ICD-10-CM

## 2018-10-25 PROCEDURE — 90471 IMMUNIZATION ADMIN: CPT | Performed by: PHYSICIAN ASSISTANT

## 2018-10-25 PROCEDURE — 90686 IIV4 VACC NO PRSV 0.5 ML IM: CPT | Performed by: PHYSICIAN ASSISTANT

## 2018-10-25 PROCEDURE — 99213 OFFICE O/P EST LOW 20 MIN: CPT | Performed by: PHYSICIAN ASSISTANT

## 2018-10-25 PROCEDURE — 3008F BODY MASS INDEX DOCD: CPT | Performed by: PHYSICIAN ASSISTANT

## 2018-10-25 RX ORDER — SERTRALINE HYDROCHLORIDE 25 MG/1
25 TABLET, FILM COATED ORAL DAILY
Qty: 30 TABLET | Refills: 2 | Status: SHIPPED | OUTPATIENT
Start: 2018-10-25 | End: 2021-05-03 | Stop reason: SDUPTHER

## 2018-10-25 NOTE — PROGRESS NOTES
Assessment/Plan:     Diagnoses and all orders for this visit:    Generalized anxiety disorder  -     sertraline (ZOLOFT) 25 mg tablet; Take 1 tablet (25 mg total) by mouth daily    Need for influenza vaccination  -     SYRINGE/SINGLE-DOSE VIAL: influenza vaccine, [id number], quadrivalent, 0 5 mL, preservative-free, for patients 3+ yr (FLUZONE)        Will restart patient on sertraline  I believe his sleep problems are stemming from his anxiety  If he is still having sleep trouble after restarting his medication he was told to let us know  He was educated on good sleep hygiene  Smoking cessation was discussed with the patient  Subjective:      Patient ID: Vickie Panda  is a 23 y o  male  Patient is a pleasant 23year old male who presents for anxiety and trouble sleeping  He states that he used to take sertraline 25mg daily to help with his anxiety, but he has been out of the medication for the past 5 months  He states that he did very well with the sertraline and denies any side effects from the medication  He states that he has trouble falling asleep and staying asleep despite being tired  He states that his mind is constantly racing and he lays in bed thinking about all of the things he has to do tomorrow rather than getting a restful sleep  He denies any thoughts of hurting himself or hurting anyone else  He denies any other issues at this time  He is interested in getting his flu vaccine today  The following portions of the patient's history were reviewed and updated as appropriate:   He  has a past medical history of Anxiety attack; Back complaints; Palpitations; Psychiatric disorder; Reactive airway disease; Syncope; and Tachyarrhythmia  He   Patient Active Problem List    Diagnosis Date Noted    Generalized anxiety disorder 10/25/2018    Pharyngitis, acute 09/01/2017     He  has a past surgical history that includes Hip surgery (Right) and Hip surgery (Right, 07/17/2014)    His family history includes No Known Problems in his father and mother  He  reports that he has been smoking Cigarettes  He has a 3 00 pack-year smoking history  His smokeless tobacco use includes Chew  He reports that he drinks alcohol  He reports that he does not use drugs  Current Outpatient Prescriptions   Medication Sig Dispense Refill    fexofenadine (ALLEGRA) 180 MG tablet Take 180 mg by mouth daily      naproxen (NAPROSYN) 500 mg tablet Take 1 tablet by mouth 2 (two) times a day with meals for 5 days 10 tablet 0    sertraline (ZOLOFT) 25 mg tablet Take 1 tablet (25 mg total) by mouth daily 30 tablet 2     No current facility-administered medications for this visit  Current Outpatient Prescriptions on File Prior to Visit   Medication Sig    fexofenadine (ALLEGRA) 180 MG tablet Take 180 mg by mouth daily    naproxen (NAPROSYN) 500 mg tablet Take 1 tablet by mouth 2 (two) times a day with meals for 5 days    [DISCONTINUED] sertraline (ZOLOFT) 25 mg tablet Take 1 tablet by mouth daily    [DISCONTINUED] predniSONE 20 mg tablet Take 3 tablets (60 mg total) by mouth daily     No current facility-administered medications on file prior to visit  He has No Known Allergies       Review of Systems   Constitutional: Negative for chills, diaphoresis, fatigue and fever  HENT: Negative for congestion, ear pain, postnasal drip, rhinorrhea, sneezing, sore throat and trouble swallowing  Eyes: Negative for pain and visual disturbance  Respiratory: Negative for apnea, cough, shortness of breath and wheezing  Cardiovascular: Negative for chest pain and palpitations  Gastrointestinal: Negative for abdominal pain, constipation, diarrhea, nausea and vomiting  Genitourinary: Negative for dysuria  Musculoskeletal: Negative for arthralgias, gait problem and myalgias  Neurological: Negative for dizziness, syncope, weakness, light-headedness, numbness and headaches     Psychiatric/Behavioral: Positive for sleep disturbance  Negative for suicidal ideas  The patient is nervous/anxious  Objective:      /76 (BP Location: Left arm, Patient Position: Sitting)   Ht 5' 8" (1 727 m)   Wt 90 kg (198 lb 6 4 oz)   BMI 30 17 kg/m²        Physical Exam   Constitutional: He is oriented to person, place, and time  He appears well-developed and well-nourished  HENT:   Head: Normocephalic and atraumatic  Right Ear: Tympanic membrane, external ear and ear canal normal    Left Ear: Tympanic membrane, external ear and ear canal normal    Nose: Nose normal    Mouth/Throat: Oropharynx is clear and moist and mucous membranes are normal  No oropharyngeal exudate, posterior oropharyngeal edema or posterior oropharyngeal erythema  Eyes: Pupils are equal, round, and reactive to light  EOM are normal    Neck: Normal range of motion  Neck supple  Cardiovascular: Normal rate, regular rhythm and normal heart sounds  Exam reveals no gallop and no friction rub  No murmur heard  Pulmonary/Chest: Effort normal and breath sounds normal  No respiratory distress  He has no wheezes  He has no rales  Musculoskeletal: Normal range of motion  Neurological: He is alert and oriented to person, place, and time  Skin: Skin is warm and dry  Psychiatric: He has a normal mood and affect  His behavior is normal  Judgment and thought content normal    Vitals reviewed

## 2019-03-04 ENCOUNTER — OFFICE VISIT (OUTPATIENT)
Dept: FAMILY MEDICINE CLINIC | Facility: CLINIC | Age: 24
End: 2019-03-04
Payer: COMMERCIAL

## 2019-03-04 VITALS
HEIGHT: 68 IN | WEIGHT: 212 LBS | BODY MASS INDEX: 32.13 KG/M2 | DIASTOLIC BLOOD PRESSURE: 90 MMHG | SYSTOLIC BLOOD PRESSURE: 132 MMHG

## 2019-03-04 DIAGNOSIS — M89.9 SCAPULAR DYSFUNCTION: Primary | ICD-10-CM

## 2019-03-04 PROCEDURE — 3008F BODY MASS INDEX DOCD: CPT | Performed by: FAMILY MEDICINE

## 2019-03-04 PROCEDURE — 99213 OFFICE O/P EST LOW 20 MIN: CPT | Performed by: FAMILY MEDICINE

## 2019-03-04 RX ORDER — PREDNISONE 10 MG/1
TABLET ORAL
Qty: 15 TABLET | Refills: 0 | Status: SHIPPED | OUTPATIENT
Start: 2019-03-04 | End: 2019-06-04 | Stop reason: ALTCHOICE

## 2019-03-04 NOTE — PROGRESS NOTES
Assessment/Plan:    No problem-specific Assessment & Plan notes found for this encounter  Diagnoses and all orders for this visit:    Scapular dysfunction          Subjective:      Patient ID: Gabi Marcano  is a 20 y o  male  Pain with movement of the left arm patient's pain seems to be centered around the scapular thoracic junction on started a few more weeks ago he was pulling on the with rope and had to exert quite a bit of force after the episode he had some tingling in his left arm which has gone away but now has chronic pain in the left shoulder his range of motion is actually quite good but he has discomfort all day long when he works as a       The following portions of the patient's history were reviewed and updated as appropriate: He  has a past medical history of Anxiety attack, Back complaints, Palpitations, Psychiatric disorder, Reactive airway disease, Syncope, and Tachyarrhythmia  He   Patient Active Problem List    Diagnosis Date Noted    Generalized anxiety disorder 10/25/2018    Pharyngitis, acute 09/01/2017     He  has a past surgical history that includes Hip surgery (Right) and Hip surgery (Right, 07/17/2014)  His family history includes No Known Problems in his father and mother  He  reports that he has been smoking cigarettes  He has a 3 00 pack-year smoking history  His smokeless tobacco use includes chew  He reports that he drinks alcohol  He reports that he does not use drugs  Current Outpatient Medications   Medication Sig Dispense Refill    fexofenadine (ALLEGRA) 180 MG tablet Take 180 mg by mouth daily      sertraline (ZOLOFT) 25 mg tablet Take 1 tablet (25 mg total) by mouth daily 30 tablet 2    naproxen (NAPROSYN) 500 mg tablet Take 1 tablet by mouth 2 (two) times a day with meals for 5 days (Patient not taking: Reported on 3/4/2019) 10 tablet 0     No current facility-administered medications for this visit        Current Outpatient Medications on File Prior to Visit   Medication Sig    fexofenadine (ALLEGRA) 180 MG tablet Take 180 mg by mouth daily    sertraline (ZOLOFT) 25 mg tablet Take 1 tablet (25 mg total) by mouth daily    naproxen (NAPROSYN) 500 mg tablet Take 1 tablet by mouth 2 (two) times a day with meals for 5 days (Patient not taking: Reported on 3/4/2019)     No current facility-administered medications on file prior to visit  He has No Known Allergies       Review of Systems   Constitutional: Negative for activity change, appetite change, diaphoresis, fatigue and fever  HENT: Negative  Eyes: Negative  Respiratory: Negative for apnea, cough, chest tightness, shortness of breath and wheezing  Cardiovascular: Negative for chest pain, palpitations and leg swelling  Gastrointestinal: Negative for abdominal distention, abdominal pain, anal bleeding, constipation, diarrhea, nausea and vomiting  Endocrine: Negative for cold intolerance, heat intolerance, polydipsia, polyphagia and polyuria  Genitourinary: Negative for difficulty urinating, dysuria, flank pain, hematuria and urgency  Musculoskeletal: Negative for arthralgias, back pain, gait problem, joint swelling and myalgias  Left posterior shoulder pain   Skin: Negative for color change, rash and wound  Allergic/Immunologic: Negative for environmental allergies, food allergies and immunocompromised state  Neurological: Negative for dizziness, seizures, syncope, speech difficulty, numbness and headaches  Hematological: Negative for adenopathy  Does not bruise/bleed easily  Psychiatric/Behavioral: Negative for agitation, behavioral problems, hallucinations, sleep disturbance and suicidal ideas  Objective:      /90 (BP Location: Left arm, Patient Position: Sitting, Cuff Size: Standard)   Ht 5' 8" (1 727 m)   Wt 96 2 kg (212 lb)   BMI 32 23 kg/m²          Physical Exam   Constitutional: He is oriented to person, place, and time   He appears well-developed and well-nourished  No distress  HENT:   Head: Normocephalic  Right Ear: External ear normal    Left Ear: External ear normal    Nose: Nose normal    Mouth/Throat: Oropharynx is clear and moist    Eyes: Pupils are equal, round, and reactive to light  Conjunctivae and EOM are normal  Right eye exhibits no discharge  Left eye exhibits no discharge  No scleral icterus  Neck: Normal range of motion  No tracheal deviation present  No thyromegaly present  Cardiovascular: Normal rate, regular rhythm and normal heart sounds  Exam reveals no gallop and no friction rub  No murmur heard  Pulmonary/Chest: Effort normal and breath sounds normal  No respiratory distress  He has no wheezes  Abdominal: Soft  Bowel sounds are normal  He exhibits no mass  There is no tenderness  There is no guarding  Musculoskeletal: Normal range of motion  He exhibits tenderness  He exhibits no edema or deformity  Lymphadenopathy:     He has no cervical adenopathy  Neurological: He is alert and oriented to person, place, and time  No cranial nerve deficit  Skin: Skin is warm and dry  No rash noted  He is not diaphoretic  No erythema  Psychiatric: He has a normal mood and affect   Thought content normal

## 2019-06-04 ENCOUNTER — OFFICE VISIT (OUTPATIENT)
Dept: FAMILY MEDICINE CLINIC | Facility: CLINIC | Age: 24
End: 2019-06-04
Payer: COMMERCIAL

## 2019-06-04 ENCOUNTER — APPOINTMENT (OUTPATIENT)
Dept: LAB | Facility: HOSPITAL | Age: 24
End: 2019-06-04
Payer: COMMERCIAL

## 2019-06-04 VITALS
HEIGHT: 68 IN | BODY MASS INDEX: 33.16 KG/M2 | DIASTOLIC BLOOD PRESSURE: 78 MMHG | WEIGHT: 218.8 LBS | SYSTOLIC BLOOD PRESSURE: 108 MMHG

## 2019-06-04 DIAGNOSIS — R36.9 URETHRAL DISCHARGE IN MALE: ICD-10-CM

## 2019-06-04 DIAGNOSIS — R36.9 URETHRAL DISCHARGE IN MALE: Primary | ICD-10-CM

## 2019-06-04 LAB
BACTERIA UR QL AUTO: ABNORMAL /HPF
BILIRUB UR QL STRIP: NEGATIVE
CLARITY UR: CLEAR
COLOR UR: YELLOW
GLUCOSE UR STRIP-MCNC: NEGATIVE MG/DL
HGB UR QL STRIP.AUTO: ABNORMAL
KETONES UR STRIP-MCNC: NEGATIVE MG/DL
LEUKOCYTE ESTERASE UR QL STRIP: ABNORMAL
NITRITE UR QL STRIP: NEGATIVE
NON-SQ EPI CELLS URNS QL MICRO: ABNORMAL /HPF
PH UR STRIP.AUTO: 6 [PH]
PROT UR STRIP-MCNC: NEGATIVE MG/DL
RBC #/AREA URNS AUTO: ABNORMAL /HPF
SP GR UR STRIP.AUTO: >=1.03 (ref 1–1.03)
UROBILINOGEN UR QL STRIP.AUTO: 0.2 E.U./DL
WBC #/AREA URNS AUTO: ABNORMAL /HPF

## 2019-06-04 PROCEDURE — 81001 URINALYSIS AUTO W/SCOPE: CPT | Performed by: PHYSICIAN ASSISTANT

## 2019-06-04 PROCEDURE — 87591 N.GONORRHOEAE DNA AMP PROB: CPT

## 2019-06-04 PROCEDURE — 87491 CHLMYD TRACH DNA AMP PROBE: CPT

## 2019-06-04 PROCEDURE — 3008F BODY MASS INDEX DOCD: CPT | Performed by: PHYSICIAN ASSISTANT

## 2019-06-04 PROCEDURE — 99213 OFFICE O/P EST LOW 20 MIN: CPT | Performed by: PHYSICIAN ASSISTANT

## 2019-06-05 ENCOUNTER — APPOINTMENT (OUTPATIENT)
Dept: LAB | Facility: HOSPITAL | Age: 24
End: 2019-06-05
Payer: COMMERCIAL

## 2019-06-05 DIAGNOSIS — R36.9 URETHRAL DISCHARGE IN MALE: Primary | ICD-10-CM

## 2019-06-05 PROCEDURE — 87086 URINE CULTURE/COLONY COUNT: CPT | Performed by: PHYSICIAN ASSISTANT

## 2019-06-05 RX ORDER — AZITHROMYCIN 250 MG/1
1000 TABLET, FILM COATED ORAL ONCE
Qty: 4 TABLET | Refills: 0 | Status: SHIPPED | OUTPATIENT
Start: 2019-06-05 | End: 2019-06-05

## 2019-06-05 RX ORDER — CEFIXIME 400 MG/1
400 CAPSULE ORAL ONCE
Qty: 1 CAPSULE | Refills: 0 | Status: SHIPPED | OUTPATIENT
Start: 2019-06-05 | End: 2019-06-05

## 2019-06-06 LAB — BACTERIA UR CULT: NORMAL

## 2019-06-07 LAB
C TRACH DNA SPEC QL NAA+PROBE: NEGATIVE
N GONORRHOEA DNA SPEC QL NAA+PROBE: NEGATIVE

## 2019-06-19 ENCOUNTER — TRANSCRIBE ORDERS (OUTPATIENT)
Dept: ADMINISTRATIVE | Facility: HOSPITAL | Age: 24
End: 2019-06-19

## 2019-06-19 DIAGNOSIS — R36.9 URETHRAL DISCHARGE IN MALE: Primary | ICD-10-CM

## 2019-06-24 ENCOUNTER — HOSPITAL ENCOUNTER (EMERGENCY)
Facility: HOSPITAL | Age: 24
Discharge: HOME | End: 2019-06-24
Admitting: EMERGENCY MEDICINE
Payer: COMMERCIAL

## 2019-06-24 VITALS
DIASTOLIC BLOOD PRESSURE: 73 MMHG | OXYGEN SATURATION: 96 % | WEIGHT: 218.7 LBS | BODY MASS INDEX: 33.15 KG/M2 | SYSTOLIC BLOOD PRESSURE: 127 MMHG | HEART RATE: 84 BPM | TEMPERATURE: 98.1 F | HEIGHT: 68 IN | RESPIRATION RATE: 18 BRPM

## 2019-06-24 DIAGNOSIS — L25.5: Primary | ICD-10-CM

## 2019-06-24 PROCEDURE — 99283 EMERGENCY DEPT VISIT LOW MDM: CPT | Performed by: PHYSICIAN ASSISTANT

## 2019-06-24 PROCEDURE — 99282 EMERGENCY DEPT VISIT SF MDM: CPT

## 2019-06-24 RX ORDER — PREDNISONE 1 MG/1
TABLET ORAL
Qty: 45 TABLET | Refills: 0 | Status: SHIPPED | OUTPATIENT
Start: 2019-06-24 | End: 2019-07-06

## 2019-06-24 NOTE — DISCHARGE INSTRUCTIONS
Take steroid taper as directed with food for the full duration  OTC benadryl as needed for itching  Can use OTC hydrocortisone as needed to affected areas

## 2019-06-24 NOTE — ED PROVIDER NOTES
History  Chief Complaint   Patient presents with    Rash     pt with rash on the left side of face and left arm     20year old male arrived ambulatory from home for evaluation of rash  He states "I have poison"  He states this has been ongoing since mid last month after doing yard work at his home  No specific treatments other than caladryl  No prior evaluation  Notes rash on face, arms, abdomen  He notes it has been spreading, itchy in nature  Denies fever, chills  Denies cough, congestion or recent illness  Denies sick contacts  Denies any other new exposures  History provided by:  Patient      Prior to Admission Medications   Prescriptions Last Dose Informant Patient Reported? Taking?   fexofenadine (ALLEGRA) 180 MG tablet More than a month at Unknown time  Yes No   Sig: Take 180 mg by mouth daily   sertraline (ZOLOFT) 25 mg tablet 6/24/2019 at Unknown time  No Yes   Sig: Take 1 tablet (25 mg total) by mouth daily      Facility-Administered Medications: None       Past Medical History:   Diagnosis Date    Anxiety attack     last assessed 02/18/15    Back complaints     Palpitations     last assessed 03/19/15    Psychiatric disorder     Reactive airway disease     last assessed 02/18/15    Syncope     last assessed 09/29/16    Tachyarrhythmia     last assessed 03/19/15       Past Surgical History:   Procedure Laterality Date    HIP SURGERY Right     HIP SURGERY Right 07/17/2014    metal strap  Family History   Problem Relation Age of Onset    No Known Problems Mother     No Known Problems Father      I have reviewed and agree with the history as documented      Social History     Tobacco Use    Smoking status: Current Some Day Smoker     Packs/day: 0 20     Years: 15 00     Pack years: 3 00     Types: Cigarettes    Smokeless tobacco: Current User     Types: Chew   Substance Use Topics    Alcohol use: Yes     Frequency: 4 or more times a week     Comment: WEEKENDS- social    Drug use: Never        Review of Systems   Constitutional: Negative  Negative for chills and fever  HENT: Negative  Negative for congestion, rhinorrhea and sore throat  Eyes: Negative  Negative for visual disturbance  Respiratory: Negative  Negative for cough, shortness of breath and wheezing  Cardiovascular: Negative  Negative for chest pain and leg swelling  Gastrointestinal: Negative  Negative for abdominal pain, constipation, diarrhea, nausea and vomiting  Genitourinary: Negative  Negative for dysuria, frequency and hematuria  Musculoskeletal: Negative  Negative for back pain and myalgias  Skin: Positive for rash  Allergic/Immunologic: Negative  Negative for environmental allergies and food allergies  Neurological: Negative  Negative for dizziness, syncope and headaches  Psychiatric/Behavioral: Negative  Negative for confusion  All other systems reviewed and are negative  Physical Exam  Physical Exam   Constitutional: He is oriented to person, place, and time  He appears well-developed and well-nourished  Non-toxic appearance  No distress  HENT:   Head: Normocephalic and atraumatic  Right Ear: Hearing, tympanic membrane, external ear and ear canal normal    Left Ear: Hearing, tympanic membrane, external ear and ear canal normal    Nose: Nose normal    Mouth/Throat: Uvula is midline, oropharynx is clear and moist and mucous membranes are normal  No oropharyngeal exudate  Eyes: Pupils are equal, round, and reactive to light  Conjunctivae and EOM are normal  No scleral icterus  Neck: Trachea normal and normal range of motion  Neck supple  No tracheal deviation present  Cardiovascular: Normal rate, regular rhythm, normal heart sounds and normal pulses  No murmur heard  Pulmonary/Chest: Effort normal and breath sounds normal  No respiratory distress  He has no wheezes  He has no rhonchi  He has no rales  Abdominal: Soft   Normal appearance and bowel sounds are normal  There is no hepatosplenomegaly  There is no tenderness  There is no rebound, no guarding and no CVA tenderness  No hernia  Musculoskeletal: Normal range of motion  He exhibits no edema or tenderness  Neurological: He is alert and oriented to person, place, and time  No cranial nerve deficit or sensory deficit  He exhibits normal muscle tone  Skin: Skin is warm and dry  Rash (raised vessicular rash - left cheek, right and left forearms, abdomen c/w appearance of contact dermatitis) noted  He is not diaphoretic  No cyanosis  Nails show no clubbing  Psychiatric: He has a normal mood and affect  His behavior is normal    Nursing note and vitals reviewed  Vital Signs  ED Triage Vitals [06/24/19 1524]   Temperature Pulse Respirations Blood Pressure SpO2   98 1 °F (36 7 °C) 84 18 127/73 96 %      Temp Source Heart Rate Source Patient Position - Orthostatic VS BP Location FiO2 (%)   Temporal -- -- -- --      Pain Score       No Pain           Vitals:    06/24/19 1524   BP: 127/73   Pulse: 84         Visual Acuity      ED Medications  Medications - No data to display    Diagnostic Studies  Results Reviewed     None                 No orders to display              Procedures  Procedures       ED Course  ED Course as of Jun 24 1547 Mon Jun 24, 2019   1541 Exam c/w contact dermatitis, suspected poison ivy  Given facial involvement, will place on oral steroid taper  Discussed usual course and treatment  Pt deemed appropriate for discharge home and outpatient follow up  Discussed use of OTC antihistamines as needed for itching  Can also use topical hydrocortisone as needed  All questions answered                            MDM  Number of Diagnoses or Management Options  Contact dermatitis due to plants, except food: minor  Risk of Complications, Morbidity, and/or Mortality  Presenting problems: minimal        Disposition  Final diagnoses:   Contact dermatitis due to plants, except food     Time reflects when diagnosis was documented in both MDM as applicable and the Disposition within this note     Time User Action Codes Description Comment    6/24/2019  3:23 PM Jackie Elizabeth Add [L25 5] Contact dermatitis due to plants, except food       ED Disposition     ED Disposition Condition Date/Time Comment    Discharge Stable Mon Jun 24, 2019  3:22 PM Maryan Kuo  discharge to home/self care  Follow-up Information     Follow up With Specialties Details Why Contact Info    Hernandez Ariza, DO Family Medicine  As needed [address] [address]  [address]  [phone]            Discharge Medication List as of 6/24/2019  3:39 PM      START taking these medications    Details   predniSONE 5 mg tablet Multiple Dosages:Starting Mon 6/24/2019, Last dose on Wed 6/26/2019, THEN Starting Thu 6/27/2019, Last dose on Sat 6/29/2019, THEN Starting Sun 6/30/2019, Last dose on Tue 7/2/2019, THEN Starting Wed 7/3/2019, Last dose on Fri 7/5/2019Take 8 tablets  (40 mg total) by mouth daily for 3 days, THEN 4 tablets (20 mg total) daily for 3 days, THEN 2 tablets (10 mg total) daily for 3 days, THEN 1 tablet (5 mg total) daily for 3 days  , Print         CONTINUE these medications which have NOT CHANGED    Details   sertraline (ZOLOFT) 25 mg tablet Take 1 tablet (25 mg total) by mouth daily, Starting Thu 10/25/2018, Normal      fexofenadine (ALLEGRA) 180 MG tablet Take 180 mg by mouth daily, Historical Med           No discharge procedures on file      ED Provider  Electronically Signed by           Teofilo Paige PA-C  06/24/19 9803

## 2019-06-25 ENCOUNTER — VBI (OUTPATIENT)
Dept: ADMINISTRATIVE | Facility: OTHER | Age: 24
End: 2019-06-25

## 2019-07-31 ENCOUNTER — APPOINTMENT (EMERGENCY)
Dept: CT IMAGING | Facility: HOSPITAL | Age: 24
End: 2019-07-31
Payer: COMMERCIAL

## 2019-07-31 ENCOUNTER — HOSPITAL ENCOUNTER (EMERGENCY)
Facility: HOSPITAL | Age: 24
Discharge: HOME | End: 2019-07-31
Attending: EMERGENCY MEDICINE | Admitting: EMERGENCY MEDICINE
Payer: COMMERCIAL

## 2019-07-31 VITALS
TEMPERATURE: 97.8 F | SYSTOLIC BLOOD PRESSURE: 108 MMHG | DIASTOLIC BLOOD PRESSURE: 73 MMHG | OXYGEN SATURATION: 95 % | WEIGHT: 206.13 LBS | BODY MASS INDEX: 31.34 KG/M2 | HEART RATE: 80 BPM | RESPIRATION RATE: 18 BRPM

## 2019-07-31 DIAGNOSIS — R10.9 ABDOMINAL PAIN: Primary | ICD-10-CM

## 2019-07-31 DIAGNOSIS — K21.9 GERD (GASTROESOPHAGEAL REFLUX DISEASE): ICD-10-CM

## 2019-07-31 LAB
ALBUMIN SERPL BCP-MCNC: 4.3 G/DL (ref 3.5–5)
ALP SERPL-CCNC: 80 U/L (ref 46–116)
ALT SERPL W P-5'-P-CCNC: 45 U/L (ref 12–78)
ANION GAP SERPL CALCULATED.3IONS-SCNC: 9 MMOL/L (ref 4–13)
APTT PPP: 28 SECONDS (ref 23–37)
AST SERPL W P-5'-P-CCNC: 21 U/L (ref 5–45)
BASOPHILS # BLD AUTO: 0.03 THOUSANDS/ΜL (ref 0–0.1)
BASOPHILS NFR BLD AUTO: 1 % (ref 0–1)
BILIRUB SERPL-MCNC: 0.5 MG/DL (ref 0.2–1)
BUN SERPL-MCNC: 10 MG/DL (ref 5–25)
CALCIUM SERPL-MCNC: 9.6 MG/DL (ref 8.3–10.1)
CHLORIDE SERPL-SCNC: 102 MMOL/L (ref 100–108)
CO2 SERPL-SCNC: 30 MMOL/L (ref 21–32)
CREAT SERPL-MCNC: 1.27 MG/DL (ref 0.6–1.3)
EOSINOPHIL # BLD AUTO: 0.19 THOUSAND/ΜL (ref 0–0.61)
EOSINOPHIL NFR BLD AUTO: 3 % (ref 0–6)
ERYTHROCYTE [DISTWIDTH] IN BLOOD BY AUTOMATED COUNT: 12 % (ref 11.6–15.1)
GFR SERPL CREATININE-BSD FRML MDRD: 78 ML/MIN/1.73SQ M
GLUCOSE SERPL-MCNC: 124 MG/DL (ref 65–140)
HCT VFR BLD AUTO: 46.1 % (ref 36.5–49.3)
HGB BLD-MCNC: 15.7 G/DL (ref 12–17)
IMM GRANULOCYTES # BLD AUTO: 0.02 THOUSAND/UL (ref 0–0.2)
IMM GRANULOCYTES NFR BLD AUTO: 0 % (ref 0–2)
INR PPP: 0.93 (ref 0.84–1.19)
LIPASE SERPL-CCNC: 107 U/L (ref 73–393)
LYMPHOCYTES # BLD AUTO: 2.39 THOUSANDS/ΜL (ref 0.6–4.47)
LYMPHOCYTES NFR BLD AUTO: 40 % (ref 14–44)
MCH RBC QN AUTO: 32 PG (ref 26.8–34.3)
MCHC RBC AUTO-ENTMCNC: 34.1 G/DL (ref 31.4–37.4)
MCV RBC AUTO: 94 FL (ref 82–98)
MONOCYTES # BLD AUTO: 0.34 THOUSAND/ΜL (ref 0.17–1.22)
MONOCYTES NFR BLD AUTO: 6 % (ref 4–12)
NEUTROPHILS # BLD AUTO: 3 THOUSANDS/ΜL (ref 1.85–7.62)
NEUTS SEG NFR BLD AUTO: 50 % (ref 43–75)
NRBC BLD AUTO-RTO: 0 /100 WBCS
PLATELET # BLD AUTO: 263 THOUSANDS/UL (ref 149–390)
PMV BLD AUTO: 9.2 FL (ref 8.9–12.7)
POTASSIUM SERPL-SCNC: 4.1 MMOL/L (ref 3.5–5.3)
PROT SERPL-MCNC: 7.8 G/DL (ref 6.4–8.2)
PROTHROMBIN TIME: 12.5 SECONDS (ref 11.6–14.5)
RBC # BLD AUTO: 4.91 MILLION/UL (ref 3.88–5.62)
SODIUM SERPL-SCNC: 141 MMOL/L (ref 136–145)
TROPONIN I SERPL-MCNC: <0.02 NG/ML
WBC # BLD AUTO: 5.97 THOUSAND/UL (ref 4.31–10.16)

## 2019-07-31 PROCEDURE — 96374 THER/PROPH/DIAG INJ IV PUSH: CPT

## 2019-07-31 PROCEDURE — 80053 COMPREHEN METABOLIC PANEL: CPT | Performed by: PHYSICIAN ASSISTANT

## 2019-07-31 PROCEDURE — 85025 COMPLETE CBC W/AUTO DIFF WBC: CPT | Performed by: PHYSICIAN ASSISTANT

## 2019-07-31 PROCEDURE — 99284 EMERGENCY DEPT VISIT MOD MDM: CPT

## 2019-07-31 PROCEDURE — 84484 ASSAY OF TROPONIN QUANT: CPT | Performed by: PHYSICIAN ASSISTANT

## 2019-07-31 PROCEDURE — 74177 CT ABD & PELVIS W/CONTRAST: CPT

## 2019-07-31 PROCEDURE — 36415 COLL VENOUS BLD VENIPUNCTURE: CPT | Performed by: PHYSICIAN ASSISTANT

## 2019-07-31 PROCEDURE — 99284 EMERGENCY DEPT VISIT MOD MDM: CPT | Performed by: PHYSICIAN ASSISTANT

## 2019-07-31 PROCEDURE — 96361 HYDRATE IV INFUSION ADD-ON: CPT

## 2019-07-31 PROCEDURE — 85610 PROTHROMBIN TIME: CPT | Performed by: PHYSICIAN ASSISTANT

## 2019-07-31 PROCEDURE — 83690 ASSAY OF LIPASE: CPT | Performed by: PHYSICIAN ASSISTANT

## 2019-07-31 PROCEDURE — 85730 THROMBOPLASTIN TIME PARTIAL: CPT | Performed by: PHYSICIAN ASSISTANT

## 2019-07-31 RX ORDER — SUCRALFATE ORAL 1 G/10ML
1000 SUSPENSION ORAL ONCE
Status: COMPLETED | OUTPATIENT
Start: 2019-07-31 | End: 2019-07-31

## 2019-07-31 RX ORDER — FAMOTIDINE 20 MG/1
20 TABLET, FILM COATED ORAL ONCE
Status: COMPLETED | OUTPATIENT
Start: 2019-07-31 | End: 2019-07-31

## 2019-07-31 RX ORDER — MAGNESIUM HYDROXIDE/ALUMINUM HYDROXICE/SIMETHICONE 120; 1200; 1200 MG/30ML; MG/30ML; MG/30ML
30 SUSPENSION ORAL ONCE
Status: COMPLETED | OUTPATIENT
Start: 2019-07-31 | End: 2019-07-31

## 2019-07-31 RX ORDER — LIDOCAINE HYDROCHLORIDE 20 MG/ML
15 SOLUTION OROPHARYNGEAL ONCE
Status: COMPLETED | OUTPATIENT
Start: 2019-07-31 | End: 2019-07-31

## 2019-07-31 RX ORDER — ONDANSETRON 2 MG/ML
4 INJECTION INTRAMUSCULAR; INTRAVENOUS ONCE
Status: COMPLETED | OUTPATIENT
Start: 2019-07-31 | End: 2019-07-31

## 2019-07-31 RX ORDER — FAMOTIDINE 20 MG/1
20 TABLET, FILM COATED ORAL 2 TIMES DAILY
Qty: 30 TABLET | Refills: 0 | Status: SHIPPED | OUTPATIENT
Start: 2019-07-31 | End: 2019-12-12 | Stop reason: ALTCHOICE

## 2019-07-31 RX ADMIN — IOHEXOL 100 ML: 350 INJECTION, SOLUTION INTRAVENOUS at 12:49

## 2019-07-31 RX ADMIN — FAMOTIDINE 20 MG: 20 TABLET ORAL at 12:13

## 2019-07-31 RX ADMIN — LIDOCAINE HYDROCHLORIDE 15 ML: 20 SOLUTION ORAL; TOPICAL at 12:14

## 2019-07-31 RX ADMIN — ALUMINUM HYDROXIDE, MAGNESIUM HYDROXIDE, AND SIMETHICONE 30 ML: 200; 200; 20 SUSPENSION ORAL at 12:13

## 2019-07-31 RX ADMIN — SUCRALFATE 1000 MG: 1 SUSPENSION ORAL at 12:13

## 2019-07-31 RX ADMIN — ONDANSETRON HYDROCHLORIDE 4 MG: 2 SOLUTION INTRAMUSCULAR; INTRAVENOUS at 12:14

## 2019-07-31 RX ADMIN — SODIUM CHLORIDE 1000 ML: 0.9 INJECTION, SOLUTION INTRAVENOUS at 12:07

## 2019-07-31 NOTE — ED PROVIDER NOTES
History  Chief Complaint   Patient presents with    Abdominal Pain     mid abdominal pain to epigastric area for 4 days   heartburn and today vomited     Patient presents to the emergency department today for evaluation of abdominal pain with belching nausea and occasional vomiting  Is been ongoing for the last 4-5 days  Call primary care who advised him to go to the ER today  He denies any black or red contents of the vomitus  States he is producing urine and stool without difficulty however the urine is slightly darker than normal   He denies constipation diarrhea  He feels though he may have reflux  He has had symptomatic reflux in the past that he self treats with Tums  Prior to Admission Medications   Prescriptions Last Dose Informant Patient Reported? Taking?   fexofenadine (ALLEGRA) 180 MG tablet   Yes Yes   Sig: Take 180 mg by mouth daily   sertraline (ZOLOFT) 25 mg tablet   No Yes   Sig: Take 1 tablet (25 mg total) by mouth daily      Facility-Administered Medications: None       Past Medical History:   Diagnosis Date    Anxiety attack     last assessed 02/18/15    Back complaints     Palpitations     last assessed 03/19/15    Psychiatric disorder     Reactive airway disease     last assessed 02/18/15    Syncope     last assessed 09/29/16    Tachyarrhythmia     last assessed 03/19/15       Past Surgical History:   Procedure Laterality Date    HIP SURGERY Right     HIP SURGERY Right 07/17/2014    metal strap  Family History   Problem Relation Age of Onset    No Known Problems Mother     No Known Problems Father      I have reviewed and agree with the history as documented      Social History     Tobacco Use    Smoking status: Current Some Day Smoker     Packs/day: 0 20     Years: 15 00     Pack years: 3 00     Types: Cigarettes    Smokeless tobacco: Current User     Types: Chew   Substance Use Topics    Alcohol use: Yes     Frequency: 4 or more times a week     Comment: WEEKENDS- social    Drug use: Never        Review of Systems   Constitutional: Negative  Negative for activity change, appetite change, chills, diaphoresis, fatigue, fever and unexpected weight change  HENT: Negative  Negative for sore throat, trouble swallowing and voice change  Eyes: Negative  Respiratory: Negative  Negative for cough, chest tightness, shortness of breath and wheezing  Cardiovascular: Negative  Negative for chest pain, palpitations and leg swelling  Gastrointestinal: Positive for abdominal distention, abdominal pain, nausea and vomiting  Negative for blood in stool  Endocrine: Negative  Genitourinary: Negative  Negative for flank pain and hematuria  Musculoskeletal: Negative  Negative for arthralgias, back pain, gait problem, joint swelling, myalgias, neck pain and neck stiffness  Skin: Negative  Negative for rash and wound  Allergic/Immunologic: Negative  Neurological: Negative  Negative for dizziness, seizures, syncope, weakness, light-headedness and headaches  Hematological: Negative  Psychiatric/Behavioral: Negative  All other systems reviewed and are negative  Physical Exam  Physical Exam   Constitutional: He is oriented to person, place, and time  Vital signs are normal  He appears well-developed and well-nourished  He does not have a sickly appearance  He does not appear ill  No distress  HENT:   Right Ear: External ear normal  No swelling  Tympanic membrane is not bulging  Left Ear: External ear normal  No swelling  Tympanic membrane is not bulging  Nose: Nose normal    Mouth/Throat: Oropharynx is clear and moist  No oropharyngeal exudate  Eyes: Pupils are equal, round, and reactive to light  Conjunctivae, EOM and lids are normal    Neck: Normal range of motion  Neck supple  No JVD present  No tracheal deviation, no edema and normal range of motion present  No thyromegaly present     Cardiovascular: Normal rate, regular rhythm, normal heart sounds, intact distal pulses and normal pulses  Exam reveals no gallop and no friction rub  No murmur heard  Pulmonary/Chest: Effort normal and breath sounds normal  No stridor  No respiratory distress  He has no wheezes  He has no rales  He exhibits no tenderness  Abdominal: Soft  Bowel sounds are normal  He exhibits no distension and no mass  There is generalized tenderness  There is no rebound, no guarding and negative Ferrer's sign  No hernia  Musculoskeletal: Normal range of motion  He exhibits no edema or tenderness  Lymphadenopathy:     He has no cervical adenopathy  Neurological: He is alert and oriented to person, place, and time  He has normal strength and normal reflexes  No cranial nerve deficit or sensory deficit  GCS eye subscore is 4  GCS verbal subscore is 5  GCS motor subscore is 6  Skin: Skin is warm and dry  Capillary refill takes less than 2 seconds  No rash noted  He is not diaphoretic  No erythema  No pallor  Psychiatric: He has a normal mood and affect  His speech is normal and behavior is normal    Vitals reviewed        Vital Signs  ED Triage Vitals [07/31/19 1154]   Temperature Pulse Respirations Blood Pressure SpO2   97 8 °F (36 6 °C) 88 18 133/88 97 %      Temp Source Heart Rate Source Patient Position - Orthostatic VS BP Location FiO2 (%)   Temporal Left Sitting Left arm --      Pain Score       4           Vitals:    07/31/19 1154 07/31/19 1230   BP: 133/88 122/76   Pulse: 88 74   Patient Position - Orthostatic VS: Sitting Sitting         Visual Acuity      ED Medications  Medications   sodium chloride 0 9 % bolus 1,000 mL (1,000 mL Intravenous New Bag 7/31/19 1207)   ondansetron (ZOFRAN) injection 4 mg (4 mg Intravenous Given 7/31/19 1214)   sucralfate (CARAFATE) oral suspension 1,000 mg (1,000 mg Oral Given 7/31/19 1213)   famotidine (PEPCID) tablet 20 mg (20 mg Oral Given 7/31/19 1213)   aluminum-magnesium hydroxide-simethicone (MYLANTA) 200-200-20 mg/5 mL oral suspension 30 mL (30 mL Oral Given 7/31/19 1213)   Lidocaine Viscous HCl (XYLOCAINE) 2 % mucosal solution 15 mL (15 mL Swish & Swallow Given 7/31/19 1214)   iohexol (OMNIPAQUE) 350 MG/ML injection (SINGLE-DOSE) 100 mL (100 mL Intravenous Given 7/31/19 1249)       Diagnostic Studies  Results Reviewed     Procedure Component Value Units Date/Time    Troponin I [[id number]]  (Normal) Collected:  07/31/19 1204    Lab Status:  Final result Specimen:  Blood from Arm, Right Updated:  07/31/19 1227     Troponin I <0 02 ng/mL     Comprehensive metabolic panel [[id number]] Collected:  07/31/19 1204    Lab Status:  Final result Specimen:  Blood from Arm, Right Updated:  07/31/19 1225     Sodium 141 mmol/L      Potassium 4 1 mmol/L      Chloride 102 mmol/L      CO2 30 mmol/L      ANION GAP 9 mmol/L      BUN 10 mg/dL      Creatinine 1 27 mg/dL      Glucose 124 mg/dL      Calcium 9 6 mg/dL      AST 21 U/L      ALT 45 U/L      Alkaline Phosphatase 80 U/L      Total Protein 7 8 g/dL      Albumin 4 3 g/dL      Total Bilirubin 0 50 mg/dL      eGFR 78 ml/min/1 73sq m     Narrative:       [place] guidelines for Chronic Kidney Disease (CKD):     Stage 1 with normal or high GFR (GFR > 90 mL/min/1 73 square meters)    Stage 2 Mild CKD (GFR = 60-89 mL/min/1 73 square meters)    Stage 3A Moderate CKD (GFR = 45-59 mL/min/1 73 square meters)    Stage 3B Moderate CKD (GFR = 30-44 mL/min/1 73 square meters)    Stage 4 Severe CKD (GFR = 15-29 mL/min/1 73 square meters)    Stage 5 End Stage CKD (GFR <15 mL/min/1 73 square meters)  Note: GFR calculation is accurate only with a steady state creatinine    Lipase [[id number]]  (Normal) Collected:  07/31/19 1204    Lab Status:  Final result Specimen:  Blood from Arm, Right Updated:  07/31/19 1219     Lipase 107 u/L     Protime-INR [[id number]]  (Normal) Collected:  07/31/19 1204    Lab Status:  Final result Specimen:  Blood from Arm, Right Updated:  07/31/19 1219 Protime 12 5 seconds      INR 0 93    APTT [[id number]]  (Normal) Collected:  07/31/19 1204    Lab Status:  Final result Specimen:  Blood from Arm, Right Updated:  07/31/19 1219     PTT 28 seconds     CBC and differential [[id number]] Collected:  07/31/19 1204    Lab Status:  Final result Specimen:  Blood from Arm, Right Updated:  07/31/19 1210     WBC 5 97 Thousand/uL      RBC 4 91 Million/uL      Hemoglobin 15 7 g/dL      Hematocrit 46 1 %      MCV 94 fL      MCH 32 0 pg      MCHC 34 1 g/dL      RDW 12 0 %      MPV 9 2 fL      Platelets 718 Thousands/uL      nRBC 0 /100 WBCs      Neutrophils Relative 50 %      Immat GRANS % 0 %      Lymphocytes Relative 40 %      Monocytes Relative 6 %      Eosinophils Relative 3 %      Basophils Relative 1 %      Neutrophils Absolute 3 00 Thousands/µL      Immature Grans Absolute 0 02 Thousand/uL      Lymphocytes Absolute 2 39 Thousands/µL      Monocytes Absolute 0 34 Thousand/µL      Eosinophils Absolute 0 19 Thousand/µL      Basophils Absolute 0 03 Thousands/µL     UA w Reflex to Microscopic [[id number]]     Lab Status:  No result Specimen:  Urine                  CT abdomen pelvis with contrast   Final Result by Soha Ocampo MD (07/31 1303)      No acute abdominopelvic findings  Workstation performed: [id number]                    Procedures  Procedures       ED Course  ED Course as of Jul 31 1317 Wed Jul 31, 2019   1203 Blood Pressure: 133/88   1203 Temperature: 97 8 °F (36 6 °C)   1203 Pulse: 88   1203 Respirations: 18   1203 SpO2: 97 %   1218 WBC: 5 97   1218 Hemoglobin: 15 7   1218 Platelet Count: 154   1251 INR: 0 93   1251 Troponin I: <0 02   1251 Sodium: 141   1251 Potassium: 4 1   1251 Albumin: 4 3   1251 TOTAL BILIRUBIN: 0 50   1251 Awaiting CT read and urine      1311 Impression      No acute abdominopelvic findings  1314 Patient was re-evaluated again at 1310 hours  He states he feels much improvement after the GI cocktail    Likely esophageal reflux disease  I did make him aware of the fact containing left inguinal hernia that he can follow up with as needed  Will prescribe reflux medicine he will follow up with primary care  MDM    Disposition  Final diagnoses:   Abdominal pain   GERD (gastroesophageal reflux disease)     Time reflects when diagnosis was documented in both MDM as applicable and the Disposition within this note     Time User Action Codes Description Comment    7/31/2019  1:11 PM Hernesto BRODY Add [R10 9] Abdominal pain     7/31/2019  1:11 PM Hernesto BRODY Add [K21 9] GERD (gastroesophageal reflux disease)       ED Disposition     ED Disposition Condition Date/Time Comment    Discharge Stable Wed Jul 31, 2019  1:11 PM Francisco Kimble  discharge to home/self care  Follow-up Information     Follow up With Specialties Details Why Contact Info    Gaurav Jacobo DO Family Medicine Schedule an appointment as soon as possible for a visit   [address]  [id number]  [phone]            Patient's Medications   Discharge Prescriptions    FAMOTIDINE (PEPCID) 20 MG TABLET    Take 1 tablet (20 mg total) by mouth 2 (two) times a day       Start Date: 7/31/2019 End Date: --       Order Dose: 20 mg       Quantity: 30 tablet    Refills: 0     No discharge procedures on file      ED Provider  Electronically Signed by           Michelle Pickard PA-C  07/31/19 5494

## 2019-07-31 NOTE — ED NOTES
Post GI cocktail, patient reports mild heartburn and lower b/l abdominal pain that is much improved        Kashif Soliman, RN  07/31/19 2027

## 2019-08-02 ENCOUNTER — VBI (OUTPATIENT)
Dept: ADMINISTRATIVE | Facility: OTHER | Age: 24
End: 2019-08-02

## 2019-08-02 NOTE — TELEPHONE ENCOUNTER
Ferdinand Severs  ED Visit Information     Ed visit date: 7/31/19   Diagnosis Description: Abdominal pain; GERD (gastroesophageal reflux disease)  In Network? Yes [address]  Discharge status: Home  Discharged with meds ? Yes  Number of ED visits to date: 1  ED Severity:3     Outreach Information    Outreach successful: Yes 1   letter mailed:0  Date Finalized:8/2/19    Care Coordination    Follow up appointment with pcp: no declined  Transportation issues ? No    [employer] type:  3 Day Outreach  Emergent necessity warranted by diagnosis:  No  [facility] PCP:  Yes  Transportation:  Self Transport  Called PCP first?:  Yes  Told to go to ED by PCP?:  Yes  Same-Day or Next Day Appointment Offered?:  No  Would have used same-day or next-day if offered?:  Yes  Feels able to call PCP for urgent problems ?:  Yes  Understands what emergencies can be handled by PCP ?:  Yes  Ever any problems getting appointment with PCP for minor emergency/urgency problems?:  Yes  Practice Contacted Patient ?:  No  Pt had ED follow up with pcp/staff ?:  No    Reason Patient went to ED instead of Urgent Care or PCP?:  Proximity, Perceived Severity of Illness, No PCP Appointment Available  Urgent care Education?:  No   I spoke to Ulysses Kelp today 8/2/19- he stated he called the practice and was told No appt available he told them he was going to the ED due to pain  Pt is aware of [facility] urgent care locations but ED was closer and he was in pain  Pt declined follow up with PCP today he is feeling better, medication- prescription is working

## 2019-09-25 ENCOUNTER — OFFICE VISIT (OUTPATIENT)
Dept: FAMILY MEDICINE CLINIC | Facility: CLINIC | Age: 24
End: 2019-09-25
Payer: COMMERCIAL

## 2019-09-25 VITALS
OXYGEN SATURATION: 95 % | SYSTOLIC BLOOD PRESSURE: 122 MMHG | BODY MASS INDEX: 32.31 KG/M2 | HEIGHT: 68 IN | WEIGHT: 213.2 LBS | DIASTOLIC BLOOD PRESSURE: 78 MMHG | HEART RATE: 83 BPM

## 2019-09-25 DIAGNOSIS — M54.2 NECK PAIN: ICD-10-CM

## 2019-09-25 DIAGNOSIS — S46.819A STRAIN OF TRAPEZIUS MUSCLE, INITIAL ENCOUNTER: Primary | ICD-10-CM

## 2019-09-25 PROCEDURE — 99213 OFFICE O/P EST LOW 20 MIN: CPT | Performed by: PHYSICIAN ASSISTANT

## 2019-09-25 RX ORDER — PREDNISONE 10 MG/1
TABLET ORAL
Qty: 30 TABLET | Refills: 0 | Status: SHIPPED | OUTPATIENT
Start: 2019-09-25 | End: 2019-10-17

## 2019-09-25 NOTE — PROGRESS NOTES
Assessment/Plan:    Problem List Items Addressed This Visit     None      Visit Diagnoses     Strain of trapezius muscle, initial encounter    -  Primary    Relevant Medications    predniSONE 10 mg tablet    Neck pain        Relevant Medications    predniSONE 10 mg tablet           Diagnoses and all orders for this visit:    Strain of trapezius muscle, initial encounter  -     predniSONE 10 mg tablet; Take 4 tabs x 3 days  3 tabs x 3 days  2 tabs x 3 days  1 tab x 3 days  Neck pain  -     predniSONE 10 mg tablet; Take 4 tabs x 3 days  3 tabs x 3 days  2 tabs x 3 days  1 tab x 3 days  Will place Bernadine Vergara on a taper of prednisone  Recommended that he try using a heating pad on his back and neck  He will let us know if symptoms do not improve or worsen  Subjective:      Patient ID: Kat Boston  is a 20 y o  male  Bernadine Vergara is a 20year old male complaining of pain in the right side of his neck and shoulder  He states that it has been going on for a few weeks, but has been worse over the past 3 days  He does work in 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  He was using a pick axe the past few days  He denies any trauma to his neck or shoulder  He has been taking ibuprofen, which provides some relief temporarily  He denies any numbness or tingling in his hand, headache, or muscle weakness  The following portions of the patient's history were reviewed and updated as appropriate:   He has a past medical history of Anxiety attack, Back complaints, Palpitations, Psychiatric disorder, Reactive airway disease, Syncope, and Tachyarrhythmia ,  does not have any pertinent problems on file  ,   has a past surgical history that includes Hip surgery (Right) and Hip surgery (Right, 07/17/2014)  ,  family history includes No Known Problems in his father and mother  ,   reports that he has been smoking cigarettes  He has a 3 00 pack-year smoking history  His smokeless tobacco use includes chew   He reports that he drinks alcohol  He reports that he does not use drugs  ,  has No Known Allergies     Current Outpatient Medications   Medication Sig Dispense Refill    fexofenadine (ALLEGRA) 180 MG tablet Take 180 mg by mouth daily      sertraline (ZOLOFT) 25 mg tablet Take 1 tablet (25 mg total) by mouth daily 30 tablet 2    famotidine (PEPCID) 20 mg tablet Take 1 tablet (20 mg total) by mouth 2 (two) times a day (Patient not taking: Reported on 9/25/2019) 30 tablet 0    predniSONE 10 mg tablet Take 4 tabs x 3 days  3 tabs x 3 days  2 tabs x 3 days  1 tab x 3 days  30 tablet 0     No current facility-administered medications for this visit  Review of Systems   Constitutional: Negative for chills, diaphoresis, fatigue and fever  HENT: Negative for congestion, ear pain, postnasal drip, rhinorrhea, sneezing, sore throat and trouble swallowing  Eyes: Negative for pain and visual disturbance  Respiratory: Negative for apnea, cough, shortness of breath and wheezing  Cardiovascular: Negative for chest pain and palpitations  Gastrointestinal: Negative for abdominal pain, constipation, diarrhea, nausea and vomiting  Genitourinary: Negative for dysuria and hematuria  Musculoskeletal: Positive for arthralgias (right shoulder and neck pain)  Negative for gait problem and myalgias  Neurological: Negative for dizziness, syncope, weakness, light-headedness, numbness and headaches  Psychiatric/Behavioral: Negative for suicidal ideas  The patient is not nervous/anxious  Objective:  Vitals:    09/25/19 1443   BP: 122/78   Pulse: 83   SpO2: 95%   Weight: 96 7 kg (213 lb 3 2 oz)   Height: 5' 8" (1 727 m)     Body mass index is 32 42 kg/m²  Physical Exam   Constitutional: He is oriented to person, place, and time  He appears well-developed and well-nourished  HENT:   Head: Normocephalic and atraumatic     Right Ear: External ear normal    Left Ear: External ear normal    Nose: Nose normal    Eyes: Pupils are equal, round, and reactive to light  EOM are normal    Neck: Normal range of motion  Neck supple  Cardiovascular: Normal rate, regular rhythm and normal heart sounds  Exam reveals no gallop and no friction rub  No murmur heard  Pulmonary/Chest: Effort normal and breath sounds normal  No respiratory distress  He has no wheezes  He has no rales  Musculoskeletal: Normal range of motion  Right shoulder: He exhibits pain  He exhibits normal range of motion, no tenderness, no swelling and no deformity  Right elbow: Normal        Right wrist: Normal         Cervical back: He exhibits tenderness and spasm  He exhibits normal range of motion, no swelling and no pain  Back:         Right hand: Normal  Normal sensation noted  Normal strength noted  Right cervical paraspinal muscle tenderness  Right sided trapezius muscle pain and spasm  Full ROM and strength of right shoulder and cervical spine  Pain increases with left sided rotation of cervical spine  Tenderness under right scapula  Negative empty can test and cross arm test       Neurological: He is alert and oriented to person, place, and time  Skin: Skin is warm and dry  Psychiatric: He has a normal mood and affect  His behavior is normal  Judgment and thought content normal    Vitals reviewed

## 2019-10-17 ENCOUNTER — OFFICE VISIT (OUTPATIENT)
Dept: FAMILY MEDICINE CLINIC | Facility: CLINIC | Age: 24
End: 2019-10-17
Payer: COMMERCIAL

## 2019-10-17 ENCOUNTER — TELEPHONE (OUTPATIENT)
Dept: FAMILY MEDICINE CLINIC | Facility: CLINIC | Age: 24
End: 2019-10-17

## 2019-10-17 ENCOUNTER — APPOINTMENT (OUTPATIENT)
Dept: RADIOLOGY | Facility: MEDICAL CENTER | Age: 24
End: 2019-10-17
Payer: COMMERCIAL

## 2019-10-17 VITALS
SYSTOLIC BLOOD PRESSURE: 120 MMHG | HEART RATE: 94 BPM | WEIGHT: 214.8 LBS | DIASTOLIC BLOOD PRESSURE: 78 MMHG | BODY MASS INDEX: 32.55 KG/M2 | HEIGHT: 68 IN | OXYGEN SATURATION: 97 %

## 2019-10-17 DIAGNOSIS — M25.511 RIGHT SHOULDER PAIN, UNSPECIFIED CHRONICITY: Primary | ICD-10-CM

## 2019-10-17 DIAGNOSIS — M54.2 NECK PAIN: ICD-10-CM

## 2019-10-17 DIAGNOSIS — M25.511 RIGHT SHOULDER PAIN, UNSPECIFIED CHRONICITY: ICD-10-CM

## 2019-10-17 DIAGNOSIS — M89.9 SCAPULAR DYSFUNCTION: ICD-10-CM

## 2019-10-17 PROCEDURE — 73030 X-RAY EXAM OF SHOULDER: CPT

## 2019-10-17 PROCEDURE — 99214 OFFICE O/P EST MOD 30 MIN: CPT | Performed by: PHYSICIAN ASSISTANT

## 2019-10-17 PROCEDURE — 72040 X-RAY EXAM NECK SPINE 2-3 VW: CPT

## 2019-10-17 PROCEDURE — 3008F BODY MASS INDEX DOCD: CPT | Performed by: PHYSICIAN ASSISTANT

## 2019-10-17 RX ORDER — NAPROXEN 500 MG/1
500 TABLET ORAL 2 TIMES DAILY WITH MEALS
Qty: 60 TABLET | Refills: 0 | Status: SHIPPED | OUTPATIENT
Start: 2019-10-17 | End: 2022-07-25 | Stop reason: ALTCHOICE

## 2019-10-17 NOTE — LETTER
October 17, 2019     Patient: Keya Tierney  YOB: 1995   Date of Visit: 10/17/2019       To Whom it May Concern:    Lauren Schusterspan is under my professional care  He was seen in my office on 10/17/2019  He may return to work on 10/18/19  If you have any questions or concerns, please don't hesitate to call           Sincerely,          Dayana Duong PA-C        CC: No Recipients

## 2019-10-17 NOTE — PROGRESS NOTES
Assessment/Plan:    Problem List Items Addressed This Visit     None      Visit Diagnoses     Right shoulder pain, unspecified chronicity    -  Primary    Relevant Medications    naproxen (NAPROSYN) 500 mg tablet    Other Relevant Orders    Ambulatory referral to Physical Therapy    XR shoulder 2+ vw right    Neck pain        Relevant Medications    naproxen (NAPROSYN) 500 mg tablet    Other Relevant Orders    Ambulatory referral to Physical Therapy    XR spine cervical 2 or 3 vw injury    Scapular dysfunction               Diagnoses and all orders for this visit:    Right shoulder pain, unspecified chronicity  -     Ambulatory referral to Physical Therapy; Future  -     XR shoulder 2+ vw right; Future  -     naproxen (NAPROSYN) 500 mg tablet; Take 1 tablet (500 mg total) by mouth 2 (two) times a day with meals    Neck pain  -     Ambulatory referral to Physical Therapy; Future  -     XR spine cervical 2 or 3 vw injury; Future  -     naproxen (NAPROSYN) 500 mg tablet; Take 1 tablet (500 mg total) by mouth 2 (two) times a day with meals    Scapular dysfunction        Will refer Ashley Hernandez to PT and try naproxen to help with pain  Advised him to continue using heating pad  Will also get images of neck and right shoulder  He will let us know if symptoms do not improve or worsen  Subjective:      Patient ID: Mala Chinchilla  is a 20 y o  male  Ashley Hernandez is a 20year old male who presents with right shoulder pain, and neck pain  This has been going on for a few weeks  He was put on a taper of prednisone, which did help temporarily  However, the symptoms returned  He denies any trauma  The pain is worse when he turns his head to the left side  The pain is located in the right side of his neck and it extends down into his right shoulder blade  He denies any decreased ROM of his shoulder or neck  He has been trying ibuprofen and a heating pad, which only helps temporarily         The following portions of the patient's history were reviewed and updated as appropriate:   He has a past medical history of Anxiety attack, Back complaints, Palpitations, Psychiatric disorder, Reactive airway disease, Syncope, and Tachyarrhythmia ,  does not have any pertinent problems on file  ,   has a past surgical history that includes Hip surgery (Right) and Hip surgery (Right, 07/17/2014)  ,  family history includes No Known Problems in his father and mother  ,   reports that he has been smoking cigarettes  He has a 3 00 pack-year smoking history  His smokeless tobacco use includes chew  He reports that he drinks alcohol  He reports that he does not use drugs  ,  has No Known Allergies     Current Outpatient Medications   Medication Sig Dispense Refill    fexofenadine (ALLEGRA) 180 MG tablet Take 180 mg by mouth daily      sertraline (ZOLOFT) 25 mg tablet Take 1 tablet (25 mg total) by mouth daily 30 tablet 2    famotidine (PEPCID) 20 mg tablet Take 1 tablet (20 mg total) by mouth 2 (two) times a day (Patient not taking: Reported on 9/25/2019) 30 tablet 0    naproxen (NAPROSYN) 500 mg tablet Take 1 tablet (500 mg total) by mouth 2 (two) times a day with meals 60 tablet 0     No current facility-administered medications for this visit  Review of Systems   Constitutional: Negative for chills, diaphoresis, fatigue and fever  HENT: Negative for congestion, ear pain, postnasal drip, rhinorrhea, sneezing, sore throat and trouble swallowing  Eyes: Negative for pain and visual disturbance  Respiratory: Negative for apnea, cough, shortness of breath and wheezing  Cardiovascular: Negative for chest pain and palpitations  Gastrointestinal: Negative for abdominal pain, constipation, diarrhea, nausea and vomiting  Genitourinary: Negative for dysuria and hematuria  Musculoskeletal: Positive for arthralgias and neck pain  Negative for gait problem and myalgias     Neurological: Negative for dizziness, syncope, weakness, light-headedness, numbness and headaches  Psychiatric/Behavioral: Negative for suicidal ideas  The patient is not nervous/anxious  Objective:  Vitals:    10/17/19 1207   BP: 120/78   Pulse: 94   SpO2: 97%   Weight: 97 4 kg (214 lb 12 8 oz)   Height: 5' 8" (1 727 m)     Body mass index is 32 66 kg/m²  Physical Exam   Constitutional: He appears well-developed and well-nourished  No distress  HENT:   Head: Normocephalic  Eyes: EOM are normal    Neck: Normal range of motion  Neck supple  Cardiovascular: Normal rate, regular rhythm and normal heart sounds  Exam reveals no gallop and no friction rub  No murmur heard  Pulmonary/Chest: Effort normal and breath sounds normal  No respiratory distress  He has no wheezes  He has no rales  Musculoskeletal:        Right shoulder: He exhibits tenderness  He exhibits normal range of motion  Cervical back: He exhibits tenderness and pain  He exhibits no swelling  Back:    Right cervical paraspinal muscle tenderness  Right sided trapezius muscle pain and spasm  Full ROM and strength of right shoulder and cervical spine  Pain increases with left sided rotation of cervical spine  Tenderness under right scapula  Skin: He is not diaphoretic  Vitals reviewed

## 2019-11-01 ENCOUNTER — HOSPITAL ENCOUNTER (EMERGENCY)
Facility: HOSPITAL | Age: 24
Discharge: HOME | End: 2019-11-01
Attending: EMERGENCY MEDICINE
Payer: COMMERCIAL

## 2019-11-01 VITALS
TEMPERATURE: 97.3 F | SYSTOLIC BLOOD PRESSURE: 143 MMHG | DIASTOLIC BLOOD PRESSURE: 89 MMHG | RESPIRATION RATE: 18 BRPM | BODY MASS INDEX: 32.28 KG/M2 | HEIGHT: 68 IN | HEART RATE: 82 BPM | WEIGHT: 213 LBS | OXYGEN SATURATION: 97 %

## 2019-11-01 DIAGNOSIS — K11.5 SALIVARY GLAND STONE: ICD-10-CM

## 2019-11-01 DIAGNOSIS — H66.91 RIGHT OTITIS MEDIA: ICD-10-CM

## 2019-11-01 PROCEDURE — 99283 EMERGENCY DEPT VISIT LOW MDM: CPT | Performed by: PHYSICIAN ASSISTANT

## 2019-11-01 PROCEDURE — 99282 EMERGENCY DEPT VISIT SF MDM: CPT

## 2019-11-01 RX ORDER — PREDNISONE 50 MG/1
50 TABLET ORAL DAILY
Qty: 5 TABLET | Refills: 0 | Status: SHIPPED | OUTPATIENT
Start: 2019-11-01 | End: 2019-12-12 | Stop reason: ALTCHOICE

## 2019-11-01 RX ORDER — AMOXICILLIN AND CLAVULANATE POTASSIUM 875; 125 MG/1; MG/1
1 TABLET, FILM COATED ORAL EVERY 12 HOURS SCHEDULED
Qty: 14 TABLET | Refills: 0 | Status: SHIPPED | OUTPATIENT
Start: 2019-11-01 | End: 2019-11-08

## 2019-11-01 NOTE — ED PROVIDER NOTES
History  Chief Complaint   Patient presents with    Earache     right earache for 4 days   pain increases with chewing  Patient presents to the emergency department today for evaluation right-sided ear pain since this morning  Also complains of pain with chewing  Denies sore throat  No problems with swallowing  No ear discharge  No fevers chills sweats chest pain shortness of breath  Patient very well-appearing  He does admit to dry mouth upon questioning  Prior to Admission Medications   Prescriptions Last Dose Informant Patient Reported? Taking?   famotidine (PEPCID) 20 mg tablet   No No   Sig: Take 1 tablet (20 mg total) by mouth 2 (two) times a day   Patient not taking: Reported on 9/25/2019   fexofenadine (ALLEGRA) 180 MG tablet   Yes No   Sig: Take 180 mg by mouth daily   naproxen (NAPROSYN) 500 mg tablet   No No   Sig: Take 1 tablet (500 mg total) by mouth 2 (two) times a day with meals   sertraline (ZOLOFT) 25 mg tablet   No No   Sig: Take 1 tablet (25 mg total) by mouth daily      Facility-Administered Medications: None       Past Medical History:   Diagnosis Date    Anxiety attack     last assessed 02/18/15    Back complaints     Palpitations     last assessed 03/19/15    Psychiatric disorder     Reactive airway disease     last assessed 02/18/15    Syncope     last assessed 09/29/16    Tachyarrhythmia     last assessed 03/19/15       Past Surgical History:   Procedure Laterality Date    HIP SURGERY Right        Family History   Problem Relation Age of Onset    No Known Problems Mother     No Known Problems Father      I have reviewed and agree with the history as documented      Social History     Tobacco Use    Smoking status: Current Some Day Smoker     Packs/day: 0 20     Years: 15 00     Pack years: 3 00     Types: Cigarettes    Smokeless tobacco: Current User     Types: Chew   Substance Use Topics    Alcohol use: Yes     Frequency: 4 or more times a week     Comment: WEEKENDS- social    Drug use: Never        Review of Systems   Constitutional: Negative  Negative for activity change, appetite change, chills, diaphoresis, fatigue, fever and unexpected weight change  HENT: Positive for ear pain and facial swelling  Negative for congestion, dental problem, drooling, ear discharge, hearing loss, mouth sores, nosebleeds, postnasal drip, rhinorrhea, sinus pain, sneezing, sore throat, trouble swallowing and voice change  Pain with chewing   Eyes: Negative  Respiratory: Negative  Negative for cough, chest tightness, shortness of breath and wheezing  Cardiovascular: Negative  Negative for chest pain, palpitations and leg swelling  Gastrointestinal: Negative  Negative for abdominal pain, blood in stool, nausea and vomiting  Endocrine: Negative  Genitourinary: Negative  Negative for flank pain and hematuria  Musculoskeletal: Negative  Negative for arthralgias, back pain, gait problem, joint swelling, myalgias, neck pain and neck stiffness  Skin: Negative  Negative for rash and wound  Allergic/Immunologic: Negative  Neurological: Negative  Negative for dizziness, seizures, syncope, weakness, light-headedness and headaches  Hematological: Negative  Psychiatric/Behavioral: Negative  All other systems reviewed and are negative  Physical Exam  Physical Exam   Constitutional: He is oriented to person, place, and time  Vital signs are normal  He appears well-developed and well-nourished  He does not have a sickly appearance  He does not appear ill  No distress  HENT:   Head: Normocephalic and atraumatic  Right Ear: External ear normal  No swelling  Tympanic membrane is not bulging  Left Ear: External ear normal  No swelling  Tympanic membrane is not bulging  Nose: Nose normal    Mouth/Throat: Oropharynx is clear and moist  No oropharyngeal exudate  Serous right-sided otitis media noted  Minimal right-sided facial swelling noted    No dental involved  Posterior pharyngeal examination normal    Eyes: Pupils are equal, round, and reactive to light  Conjunctivae, EOM and lids are normal    Neck: Normal range of motion  Neck supple  No JVD present  No tracheal deviation, no edema and normal range of motion present  No thyromegaly present  Cardiovascular: Normal rate, regular rhythm, normal heart sounds, intact distal pulses and normal pulses  Exam reveals no gallop and no friction rub  No murmur heard  Pulmonary/Chest: Effort normal and breath sounds normal  No stridor  No respiratory distress  He has no wheezes  He has no rales  He exhibits no tenderness  Abdominal: Soft  Bowel sounds are normal  He exhibits no distension and no mass  There is no tenderness  There is no rebound, no guarding and negative Ferrer's sign  No hernia  Musculoskeletal: Normal range of motion  He exhibits no edema or tenderness  Lymphadenopathy:     He has no cervical adenopathy  Neurological: He is alert and oriented to person, place, and time  He has normal strength and normal reflexes  No cranial nerve deficit or sensory deficit  GCS eye subscore is 4  GCS verbal subscore is 5  GCS motor subscore is 6  Skin: Skin is warm and dry  Capillary refill takes less than 2 seconds  No rash noted  He is not diaphoretic  No erythema  No pallor  Psychiatric: He has a normal mood and affect  His speech is normal and behavior is normal    Vitals reviewed        Vital Signs  ED Triage Vitals [11/01/19 1221]   Temperature Pulse Respirations Blood Pressure SpO2   (!) 97 3 °F (36 3 °C) 82 18 143/89 97 %      Temp src Heart Rate Source Patient Position - Orthostatic VS BP Location FiO2 (%)   -- Monitor Sitting Right arm --      Pain Score       No Pain           Vitals:    11/01/19 1221   BP: 143/89   Pulse: 82   Patient Position - Orthostatic VS: Sitting         Visual Acuity      ED Medications  Medications - No data to display    Diagnostic Studies  Results Reviewed None                 No orders to display              Procedures  Procedures       ED Course                               MDM    Disposition  Final diagnoses:   Salivary gland stone   Right otitis media     Time reflects when diagnosis was documented in both MDM as applicable and the Disposition within this note     Time User Action Codes Description Comment    11/1/2019 12:26 PM Surinder Horns Add [K11 5] Salivary gland stone     11/1/2019 12:26 PM Surinder Horns Add [T84 50] Right otitis media     11/1/2019 12:26 PM Ann BRODY Modify [K11 5] Salivary gland stone       ED Disposition     ED Disposition Condition Date/Time Comment    Discharge Stable Fri Nov 1, 2019 12:25 PM Madhurihaydee Gilbertine  discharge to home/self care  Follow-up Information     Follow up With Specialties Details Why Contact Info    Tosin Serrano DO Family Medicine   [address] [place] [address]  [address]  [facility] [address] [phone]            Patient's Medications   Discharge Prescriptions    AMOXICILLIN-CLAVULANATE (AUGMENTIN) 875-125 MG PER TABLET    Take 1 tablet by mouth every 12 (twelve) hours for 7 days       Start Date: 11/1/2019 End Date: 11/8/2019       Order Dose: 1 tablet       Quantity: 14 tablet    Refills: 0    PREDNISONE 50 MG TABLET    Take 1 tablet (50 mg total) by mouth daily       Start Date: 11/1/2019 End Date: --       Order Dose: 50 mg       Quantity: 5 tablet    Refills: 0     No discharge procedures on file      ED Provider  Electronically Signed by           Jesica Azul PA-C  11/01/19 6306

## 2019-11-04 ENCOUNTER — VBI (OUTPATIENT)
Dept: FAMILY MEDICINE CLINIC | Facility: CLINIC | Age: 24
End: 2019-11-04

## 2019-12-12 ENCOUNTER — OFFICE VISIT (OUTPATIENT)
Dept: FAMILY MEDICINE CLINIC | Facility: CLINIC | Age: 24
End: 2019-12-12
Payer: COMMERCIAL

## 2019-12-12 VITALS
HEART RATE: 96 BPM | WEIGHT: 216.4 LBS | OXYGEN SATURATION: 96 % | SYSTOLIC BLOOD PRESSURE: 118 MMHG | HEIGHT: 68 IN | BODY MASS INDEX: 32.8 KG/M2 | DIASTOLIC BLOOD PRESSURE: 82 MMHG

## 2019-12-12 DIAGNOSIS — M89.9 SCAPULAR DYSFUNCTION: Primary | ICD-10-CM

## 2019-12-12 DIAGNOSIS — M25.511 RIGHT SHOULDER PAIN, UNSPECIFIED CHRONICITY: ICD-10-CM

## 2019-12-12 DIAGNOSIS — M62.838 TRAPEZIUS MUSCLE SPASM: ICD-10-CM

## 2019-12-12 PROCEDURE — 4004F PT TOBACCO SCREEN RCVD TLK: CPT | Performed by: PHYSICIAN ASSISTANT

## 2019-12-12 PROCEDURE — 3008F BODY MASS INDEX DOCD: CPT | Performed by: PHYSICIAN ASSISTANT

## 2019-12-12 PROCEDURE — 99213 OFFICE O/P EST LOW 20 MIN: CPT | Performed by: PHYSICIAN ASSISTANT

## 2019-12-12 NOTE — PROGRESS NOTES
Assessment/Plan:    Problem List Items Addressed This Visit     None      Visit Diagnoses     Scapular dysfunction    -  Primary    Trapezius muscle spasm        Right shoulder pain, unspecified chronicity        BMI 32 0-32 9,adult               Diagnoses and all orders for this visit:    Scapular dysfunction    Trapezius muscle spasm    Right shoulder pain, unspecified chronicity    BMI 32 0-32 9,adult        Linden Todd will complete PT  He will continue rest, heat, and naproxen as needed  He will return after completion of PT for follow-up if symptoms do not improve or worsen  Subjective:      Patient ID: Joni Dukes  is a 20 y o  male     Jesus Cannon is a 20year old male who presents with right shoulder pain, and neck pain  The pain is located in the right side of his neck and it extends down into his right shoulder blade  Occasionally he gets numbness and tingling down his arm into his thumb and first finger  This has been going on since September  He had imaging done of his shoulder and cervical spine, which did not reveal any abnormalities  He states that the pain is constant, but flares  He has tried ice, but that makes it worse  Heat and naproxen provide some relief  He denies any trauma  The pain is worse with overhead activities, sleeping, or when he turns his head to the left side  He denies any decreased ROM of his shoulder or neck  He is a heavy   The following portions of the patient's history were reviewed and updated as appropriate:   He has a past medical history of Anxiety attack, Back complaints, Palpitations, Psychiatric disorder, Reactive airway disease, Syncope, and Tachyarrhythmia ,  does not have any pertinent problems on file  ,   has a past surgical history that includes Hip surgery (Right)  ,  family history includes No Known Problems in his father and mother  ,   reports that he has been smoking cigarettes  He has a 3 00 pack-year smoking history   His smokeless tobacco use includes chew  He reports that he drinks alcohol  He reports that he does not use drugs  ,  has No Known Allergies     Current Outpatient Medications   Medication Sig Dispense Refill    fexofenadine (ALLEGRA) 180 MG tablet Take 180 mg by mouth daily      naproxen (NAPROSYN) 500 mg tablet Take 1 tablet (500 mg total) by mouth 2 (two) times a day with meals 60 tablet 0    sertraline (ZOLOFT) 25 mg tablet Take 1 tablet (25 mg total) by mouth daily 30 tablet 2     No current facility-administered medications for this visit  Review of Systems   Constitutional: Negative for chills, diaphoresis, fatigue and fever  HENT: Negative for congestion, ear pain, postnasal drip, rhinorrhea, sneezing, sore throat and trouble swallowing  Eyes: Negative for pain and visual disturbance  Respiratory: Negative for apnea, cough, shortness of breath and wheezing  Cardiovascular: Negative for chest pain and palpitations  Gastrointestinal: Negative for abdominal pain, constipation, diarrhea, nausea and vomiting  Genitourinary: Negative for dysuria  Musculoskeletal: Positive for arthralgias (right shoulder) and neck pain  Negative for gait problem and myalgias  Neurological: Positive for numbness  Negative for dizziness, syncope, weakness, light-headedness and headaches  Psychiatric/Behavioral: Negative for suicidal ideas  The patient is not nervous/anxious  Objective:  Vitals:    12/12/19 1445   BP: 118/82   BP Location: Left arm   Patient Position: Sitting   Pulse: 96   SpO2: 96%   Weight: 98 2 kg (216 lb 6 4 oz)   Height: 5' 8" (1 727 m)     Body mass index is 32 9 kg/m²  Physical Exam   Constitutional: He is oriented to person, place, and time  He appears well-developed and well-nourished  HENT:   Head: Normocephalic and atraumatic     Right Ear: Tympanic membrane, external ear and ear canal normal    Left Ear: Tympanic membrane, external ear and ear canal normal    Nose: Nose normal  Mouth/Throat: Oropharynx is clear and moist and mucous membranes are normal  No oropharyngeal exudate, posterior oropharyngeal edema or posterior oropharyngeal erythema  Eyes: Pupils are equal, round, and reactive to light  EOM are normal    Neck: Normal range of motion  Neck supple  Cardiovascular: Normal rate, regular rhythm and normal heart sounds  Exam reveals no gallop and no friction rub  No murmur heard  Pulmonary/Chest: Effort normal and breath sounds normal  No respiratory distress  He has no wheezes  He has no rales  Musculoskeletal: Normal range of motion  Right shoulder: He exhibits pain  He exhibits normal range of motion, no tenderness, no swelling, no effusion, normal pulse and normal strength  Cervical back: He exhibits tenderness and pain  Back:    Right cervical paraspinal muscle tenderness  Right sided trapezius muscle pain and spasm  Full ROM and strength of right shoulder and cervical spine  Tenderness under right scapula  Lymphadenopathy:     He has no cervical adenopathy  Neurological: He is alert and oriented to person, place, and time  Skin: Skin is warm and dry  Psychiatric: He has a normal mood and affect  His behavior is normal  Judgment and thought content normal    Vitals reviewed  BMI Counseling: Body mass index is 32 9 kg/m²  The BMI is above normal  Nutrition recommendations include 3-5 servings of fruits/vegetables daily  Exercise recommendations include exercising 3-5 times per week

## 2019-12-18 ENCOUNTER — TELEPHONE (OUTPATIENT)
Dept: FAMILY MEDICINE CLINIC | Facility: CLINIC | Age: 24
End: 2019-12-18

## 2019-12-18 ENCOUNTER — EVALUATION (OUTPATIENT)
Dept: PHYSICAL THERAPY | Facility: CLINIC | Age: 24
End: 2019-12-18
Payer: COMMERCIAL

## 2019-12-18 DIAGNOSIS — M54.2 NECK PAIN: ICD-10-CM

## 2019-12-18 DIAGNOSIS — M54.12 CERVICAL RADICULOPATHY: Primary | ICD-10-CM

## 2019-12-18 DIAGNOSIS — M25.511 RIGHT SHOULDER PAIN, UNSPECIFIED CHRONICITY: ICD-10-CM

## 2019-12-18 PROCEDURE — 97162 PT EVAL MOD COMPLEX 30 MIN: CPT | Performed by: PHYSICAL THERAPIST

## 2019-12-18 PROCEDURE — G0283 ELEC STIM OTHER THAN WOUND: HCPCS | Performed by: PHYSICAL THERAPIST

## 2019-12-18 PROCEDURE — 97014 ELECTRIC STIMULATION THERAPY: CPT | Performed by: PHYSICAL THERAPIST
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
## 2019-12-18 NOTE — TELEPHONE ENCOUNTER
Please contact patient and let him know that his physical therapist contacted us that he would benefit from further imaging to evaluate his neck pain  I did place an order for an MRI and we will refer him appropriately following results  He should let us know if he has any questions

## 2019-12-27 ENCOUNTER — HOSPITAL ENCOUNTER (OUTPATIENT)
Dept: MRI IMAGING | Facility: HOSPITAL | Age: 24
Discharge: HOME | End: 2019-12-27
Payer: COMMERCIAL

## 2019-12-27 DIAGNOSIS — M54.12 CERVICAL RADICULOPATHY: ICD-10-CM

## 2019-12-27 PROCEDURE — 72141 MRI NECK SPINE W/O DYE: CPT

## 2019-12-27 NOTE — LETTER
[address] [place] [address]  [address]      December 31, 2019    MRN: [id number]     Phone: [phone]     Dear Mr Madalyn Machuca recently had a(n) MRI performed on 12/27/2019 at  [address] [place] [address] that was requested by Juanito Kiser PA-C  The study was reviewed by a radiologist, which is a physician who specializes in medical imaging  The radiologist issued a report describing his or her findings  In that report there was a finding that the radiologist felt warranted further discussion with your health care provider and that discussion would be beneficial to you  The results were sent to Juanito Kiser PA-C on 12/30/2019  We recommend that you contact Juanito Kiser PA-C at [phone] or set up an appointment to discuss the results of the imaging test  If you have already heard from Juanito Kiser PA-C regarding the results of your study, you can disregard this letter  This letter is not meant to alarm you, but intended to encourage you to follow-up on your results with the provider that sent you for the imaging study  In addition, we have enclosed answers to frequently asked questions by other patients who have also received a letter to review results with their health care provider (see page two)  Thank you for choosing [facility] for your medical imaging needs  FREQUENTLY ASKED QUESTIONS    1  Why am I receiving this letter? [facility] requires us to notify patients who have findings on imaging exams that may require more testing or follow-up with a health professional within the next 3 months          2  How serious is the finding on the imaging test?  This letter is sent to all patients who may need follow-up or more testing within the next 3 months  Receiving this letter does not necessarily mean you have a life-threatening imaging finding or disease  Recommendations in the radiologists imaging report are general in nature and it is up to your healthcare provider to say whether those recommendations make sense for your situation  You are strongly encouraged to talk to your health care provider about the results and ask whether additional steps need to be taken  3  Where can I get a copy of the final report for my recent radiology exam?  To get a full copy of the report you can access your records online at [email or web address] or please contact [address] [place] [address] Records Department at [phone] Monday through Friday between 8 am and 6 pm          4  What do I need to do now? Please contact your health care provider who requested the imaging study to discuss what further actions (if any) are needed  You may have already heard from (your ordering provider) in regard to this test in which case you can disregard this letter  NOTICE IN ACCORDANCE WITH THE [facility] PATIENT TEST RESULT INFORMATION ACT OF 2018    You are receiving this notice as a result of a determination by your diagnostic imaging service that further discussions of your test results are warranted and would be beneficial to you  The complete results of your test or tests have been or will be sent to the health care practitioner that ordered the test or tests  It is recommended that you contact your health care practitioner to discuss your results as soon as possible

## 2019-12-30 DIAGNOSIS — M54.12 CERVICAL RADICULOPATHY: ICD-10-CM

## 2019-12-30 DIAGNOSIS — M50.20 CERVICAL DISC HERNIATION: Primary | ICD-10-CM

## 2020-01-13 ENCOUNTER — OFFICE VISIT (OUTPATIENT)
Dept: OBGYN CLINIC | Facility: CLINIC | Age: 25
End: 2020-01-13
Payer: COMMERCIAL

## 2020-01-13 VITALS
SYSTOLIC BLOOD PRESSURE: 122 MMHG | DIASTOLIC BLOOD PRESSURE: 78 MMHG | HEIGHT: 68 IN | BODY MASS INDEX: 33.34 KG/M2 | WEIGHT: 220 LBS

## 2020-01-13 DIAGNOSIS — R29.898 WEAKNESS OF RIGHT ARM: ICD-10-CM

## 2020-01-13 DIAGNOSIS — M50.20 CERVICAL DISC HERNIATION: Primary | ICD-10-CM

## 2020-01-13 PROCEDURE — 99204 OFFICE O/P NEW MOD 45 MIN: CPT | Performed by: EMERGENCY MEDICINE

## 2020-01-13 NOTE — LETTER
January 13, 2020     Deanne Rojas PA-C  [facility]    Patient: Philippe Redding  YOB: 1995   Date of Visit: 1/13/2020       Dear Dr Sarah Ferraro:    Thank you for referring Travisaracelis Farzaneh to me for evaluation  Below are the relevant portions of my assessment and plan of care  If you have questions, please do not hesitate to call me  I look forward to following Noelle Lawrence along with you           Sincerely,        Po Kendall MD        CC: No Recipients

## 2020-01-13 NOTE — PROGRESS NOTES
Assessment/Plan:    Diagnoses and all orders for this visit:    Cervical disc herniation  -     Ambulatory referral to Orthopedic Surgery  -     Ambulatory referral to Pain Management; Future  -     Ambulatory referral to Orthopedic Surgery; Future  -     Ambulatory referral to Physical Therapy; Future    Weakness of right arm  -     Ambulatory referral to Orthopedic Surgery  -     Ambulatory referral to Pain Management; Future  -     Ambulatory referral to Orthopedic Surgery; Future  -     Ambulatory referral to Physical Therapy; Future     Referral placed for orthopedic spine surgeon as well as pain management for further evaluation and discussion on treatment regarding his chronic right-sided neck pain and significant right C6-7 weakness  He may continue naproxen as he has already been prescribed prednisone twice for these symptoms  We will start PT and see him back as needed  Return if symptoms worsen or fail to improve  Chief Complaint:     Chief Complaint   Patient presents with    Spine - Pain    Neck - Pain       Subjective:   Patient ID: Mala Chinchilla  is a 30 y o  male  New patient presents referred by PCP for right sided neck and shoulder pain ongoing for months he did complain of right arm radicular type symptoms any has notice weakness of the right arm as well  Symptoms were worse with rotation and movement of the neck  He has been treating with his PCP was on prednisone and has been taking naproxen as needed  He does note improvement overall in his symptoms as he is no longer experience radicular type pain but continues with weakness and right-sided neck pain  He does experience some numbness tingling of the index finger  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  Review of Systems   Constitutional: Negative for fever  Respiratory: Negative for shortness of breath      Cardiovascular: Negative for chest pain  Gastrointestinal: Negative for abdominal pain  Musculoskeletal: Positive for neck pain and neck stiffness  Neurological: Positive for weakness  The following portions of the patient's chart were reviewed and updated as appropriate:    Allergy:  No Known Allergies      Past Medical History:   Diagnosis Date    Anxiety attack     last assessed 02/18/15    Back complaints     Palpitations     last assessed 03/19/15    Psychiatric disorder     Reactive airway disease     last assessed 02/18/15    Syncope     last assessed 09/29/16    Tachyarrhythmia     last assessed 03/19/15       Past Surgical History:   Procedure Laterality Date    HIP SURGERY Right        Social History     Socioeconomic History    Marital status: Single     Spouse name: Not on file    Number of children: Not on file    Years of education: Not on file    Highest education level: Not on file   Occupational History    Not on file   Social Needs    Financial resource strain: Not on file    Food insecurity:     Worry: Not on file     Inability: Not on file    Transportation needs:     Medical: Not on file     Non-medical: Not on file   Tobacco Use    Smoking status: Current Some Day Smoker     Packs/day: 0 20     Years: 15 00     Pack years: 3 00     Types: Cigarettes    Smokeless tobacco: Current User     Types: Chew   Substance and Sexual Activity    Alcohol use: Yes     Frequency: 2-4 times a month     Comment: WEEKENDS- social    Drug use: Never    Sexual activity: Not on file   Lifestyle    Physical activity:     Days per week: Not on file     Minutes per session: Not on file    Stress: Not on file   Relationships    Social connections:     Talks on phone: Not on file     Gets together: Not on file     Attends L[employer] day service: Not on file     Active member of club or organization: Not on file     Attends meetings of clubs or organizations: Not on file     Relationship status: Not on file   Milad Haynes Intimate partner violence:     Fear of current or ex partner: Not on file     Emotionally abused: Not on file     Physically abused: Not on file     Forced sexual activity: Not on file   Other Topics Concern    Not on file   Social History Narrative    No living will       Family History   Problem Relation Age of Onset    No Known Problems Mother     No Known Problems Father        Medications:    Current Outpatient Medications:     fexofenadine (ALLEGRA) 180 MG tablet, Take 180 mg by mouth daily, Disp: , Rfl:     sertraline (ZOLOFT) 25 mg tablet, Take 1 tablet (25 mg total) by mouth daily, Disp: 30 tablet, Rfl: 2    naproxen (NAPROSYN) 500 mg tablet, Take 1 tablet (500 mg total) by mouth 2 (two) times a day with meals (Patient not taking: Reported on 1/13/2020), Disp: 60 tablet, Rfl: 0    Patient Active Problem List   Diagnosis    Pharyngitis, acute    Generalized anxiety disorder    Cervical disc herniation    Cervical radiculopathy       Objective:  /78 (BP Location: Right arm, Patient Position: Sitting, Cuff Size: Large)   Ht 5' 8" (1 727 m)   Wt 99 8 kg (220 lb)   BMI 33 45 kg/m²     Back Exam     Comments:  Full range of motion of the cervical spine with mild right-sided discomfort  Spurling's to the right negative            Physical Exam   Constitutional: He is oriented to person, place, and time  He appears well-developed and well-nourished  HENT:   Head: Normocephalic and atraumatic  Eyes: Conjunctivae are normal    Neck: Neck supple  Pulmonary/Chest: Effort normal    Neurological: He is alert and oriented to person, place, and time  Skin: Skin is warm and dry  Psychiatric: He has a normal mood and affect  His behavior is normal    Vitals reviewed  Neurologic Exam     Mental Status   Oriented to person, place, and time       Motor Exam     Strength   Right biceps: 5/5  Left biceps: 5/5  Right triceps: 3/5  Left triceps: 5/5      Procedures    I have personally reviewed the written report of the pertinent studies  MRI C Spine  IMPRESSION:     Limited evaluation due to patient motion    Notable right-sided disc extrusion at C6-C7 resulting in ipsilateral spinal and foraminal as well as subarticular recess stenosis      Right foraminal protrusion and uncovertebral hypertrophy with mild right foraminal stenosis at C3-C4      No cord signal abnormality

## 2020-01-20 ENCOUNTER — OFFICE VISIT (OUTPATIENT)
Dept: OBGYN CLINIC | Facility: HOSPITAL | Age: 25
End: 2020-01-20
Attending: EMERGENCY MEDICINE
Payer: COMMERCIAL

## 2020-01-20 VITALS
HEIGHT: 68 IN | WEIGHT: 220 LBS | BODY MASS INDEX: 33.34 KG/M2 | HEART RATE: 108 BPM | SYSTOLIC BLOOD PRESSURE: 130 MMHG | DIASTOLIC BLOOD PRESSURE: 86 MMHG

## 2020-01-20 DIAGNOSIS — M50.20 CERVICAL DISC HERNIATION: ICD-10-CM

## 2020-01-20 DIAGNOSIS — M54.12 CERVICAL RADICULOPATHY: Primary | ICD-10-CM

## 2020-01-20 DIAGNOSIS — R29.898 WEAKNESS OF RIGHT ARM: ICD-10-CM

## 2020-01-20 PROCEDURE — 99243 OFF/OP CNSLTJ NEW/EST LOW 30: CPT | Performed by: ORTHOPAEDIC SURGERY

## 2020-01-20 PROCEDURE — 3008F BODY MASS INDEX DOCD: CPT | Performed by: ORTHOPAEDIC SURGERY

## 2020-01-20 NOTE — LETTER
January 21, 2020     BERNARD Jiménez  [address]  [address] [facility],[address]    Patient: Shubham Felton  YOB: 1995   Date of Visit: 1/20/2020       Dear Dr Marylee Glasser: Thank you for referring Jay De Dios to me for evaluation  Below are my notes for this consultation  If you have questions, please do not hesitate to call me  I look forward to following your patient along with you  Sincerely,        Chucho Ruiz MD        CC: No Recipients  Chucho Ruiz MD  1/20/2020  3:43 PM  Signed  Assessment/Plan:    Patient seen and examined with Dr Sascha Dubois  He presents with right-sided neck pain with radiation to the right upper extremity over the last several months that has recently improved  He also has weakness right triceps compared to the left  MRI of the cervical spine is limited due to artifact however there is a disc herniation on the right at C6-7  The above symptoms are stemming from nerve impingement at this level  Given his clinical weakness in the C7 right nerve root distribution, Our recommendation is to initiate physical therapy for cervical disc herniation/cervical radiculopathy  Follow-up in six weeks for re-evaluation  Problem List Items Addressed This Visit        Nervous and Auditory    Cervical radiculopathy - Primary       Musculoskeletal and Integument    Cervical disc herniation      Other Visit Diagnoses     Weakness of right arm                Subjective:      Patient ID: Shubham Felton  is a 30 y o  male  HPI     The patient is a 78-year-old male presents for initial evaluation with Dr Sascha Dubois  He has been referred by [address] [place] [address] for evaluation of cervical disc herniation  According to the patient, about two years ago he had bilateral shoulder pain stemming from his neck however more recently he has had right-sided neck pain with radiation to the entire right upper extremity to the fingertips    This was associated with intermittent numbness and tingling to the second and third digits  He still states he has residual numbness in the index finger tip  He expresses subjective weakness of the right upper extremity compared to the left  Today, he states that overall his pain has improved over the last several weeks and is almost non-existent  He does state that when he flexes his neck he does get tightness in the shoulder region however no sharp shooting pains  He denies any gait instability, no trouble gripping fine objects  No bowel or bladder incontinence, no saddle anesthesia  He has not done extended physical therapy or had injections for this problem  There was no injury that prompted this problem in the past, no history of cervical spine surgery  The following portions of the patient's history were reviewed and updated as appropriate: allergies, current medications, past family history, past medical history, past social history, past surgical history and problem list     Review of Systems   Constitutional: Positive for activity change  Negative for chills, diaphoresis, fatigue and fever  Eyes: Negative for discharge  Respiratory: Negative  Cardiovascular: Negative  Gastrointestinal: Negative  Genitourinary: Negative for decreased urine volume and difficulty urinating  Musculoskeletal: Positive for neck pain and neck stiffness  Skin: Negative  Neurological: Positive for weakness and numbness  Psychiatric/Behavioral: Negative  All other systems reviewed and are negative  Objective:      Ht 5' 8" (1 727 m)   Wt 99 8 kg (220 lb)   BMI 33 45 kg/m²           Physical Exam   Constitutional: He is oriented to person, place, and time  He appears well-developed and well-nourished  No distress  HENT:   Head: Normocephalic and atraumatic  Eyes: Right eye exhibits no discharge  Cardiovascular: Intact distal pulses  Pulmonary/Chest: Effort normal  No respiratory distress  Abdominal: Soft  Musculoskeletal: He exhibits tenderness  Neurological: He is alert and oriented to person, place, and time  Skin: Skin is warm and dry  He is not diaphoretic  Psychiatric: He has a normal mood and affect  Nursing note and vitals reviewed  No acute distress  Gait is normal without assistance  Inspection open wounds or erythema  Negative Spurling's bilaterally  Cervical range of motion is limited with flexion otherwise normal in all planes  Negative Marshall's bilaterally  No sustained clonus, no hyperreflexia  Mild TTP right trapezius region  Strength is 4/5 right triceps, otherwise strength is 5/5 C5-T1 bilaterally  Sensation is diminished right PIN distribution compared to the left  Reflexes are hypoactive active C5-C7 bilaterally  Palpable radial pulse bilaterally  Upper extremities are warm well perfused

## 2020-01-20 NOTE — PROGRESS NOTES
Assessment/Plan:    Patient seen and examined with Dr Aquilino Molina  He presents with right-sided neck pain with radiation to the right upper extremity over the last several months that has recently improved  He also has weakness right triceps compared to the left  MRI of the cervical spine is limited due to artifact however there is a disc herniation on the right at C6-7  The above symptoms are stemming from nerve impingement at this level  Given his clinical weakness in the C7 right nerve root distribution, Our recommendation is to initiate physical therapy for cervical disc herniation/cervical radiculopathy  Follow-up in six weeks for re-evaluation  Problem List Items Addressed This Visit        Nervous and Auditory    Cervical radiculopathy - Primary       Musculoskeletal and Integument    Cervical disc herniation      Other Visit Diagnoses     Weakness of right arm                Subjective:      Patient ID: Alfonso Lovett  is a 30 y o  male  HPI     The patient is a 57-year-old male presents for initial evaluation with Dr Aquilino Molina  [facility] of cervical disc herniation  According to the patient, about two years ago he had bilateral shoulder pain stemming from his neck however more recently he has had right-sided neck pain with radiation to the entire right upper extremity to the fingertips  This was associated with intermittent numbness and tingling to the second and third digits  He still states he has residual numbness in the index finger tip  He expresses subjective weakness of the right upper extremity compared to the left  Today, he states that overall his pain has improved over the last several weeks and is almost non-existent  He does state that when he flexes his neck he does get tightness in the shoulder region however no sharp shooting pains  He denies any gait instability, no trouble gripping fine objects    No bowel or bladder incontinence, no saddle anesthesia  He has not done extended physical therapy or had injections for this problem  There was no injury that prompted this problem in the past, no history of cervical spine surgery  The following portions of the patient's history were reviewed and updated as appropriate: allergies, current medications, past family history, past medical history, past social history, past surgical history and problem list     Review of Systems   Constitutional: Positive for activity change  Negative for chills, diaphoresis, fatigue and fever  Eyes: Negative for discharge  Respiratory: Negative  Cardiovascular: Negative  Gastrointestinal: Negative  Genitourinary: Negative for decreased urine volume and difficulty urinating  Musculoskeletal: Positive for neck pain and neck stiffness  Skin: Negative  Neurological: Positive for weakness and numbness  Psychiatric/Behavioral: Negative  All other systems reviewed and are negative  Objective:      Ht 5' 8" (1 727 m)   Wt 99 8 kg (220 lb)   BMI 33 45 kg/m²          Physical Exam   Constitutional: He is oriented to person, place, and time  He appears well-developed and well-nourished  No distress  HENT:   Head: Normocephalic and atraumatic  Eyes: Right eye exhibits no discharge  Cardiovascular: Intact distal pulses  Pulmonary/Chest: Effort normal  No respiratory distress  Abdominal: Soft  Musculoskeletal: He exhibits tenderness  Neurological: He is alert and oriented to person, place, and time  Skin: Skin is warm and dry  He is not diaphoretic  Psychiatric: He has a normal mood and affect  Nursing note and vitals reviewed  No acute distress  Gait is normal without assistance  Inspection open wounds or erythema  Negative Spurling's bilaterally  Cervical range of motion is limited with flexion otherwise normal in all planes  Negative Marshall's bilaterally    No sustained clonus, no hyperreflexia  Mild TTP right trapezius region  Strength is 4/5 right triceps, otherwise strength is 5/5 C5-T1 bilaterally  Sensation is diminished right PIN distribution compared to the left  Reflexes are hypoactive active C5-C7 bilaterally  Palpable radial pulse bilaterally  Upper extremities are warm well perfused

## 2020-01-23 ENCOUNTER — EVALUATION (OUTPATIENT)
Dept: PHYSICAL THERAPY | Facility: CLINIC | Age: 25
End: 2020-01-23
Payer: COMMERCIAL

## 2020-01-23 DIAGNOSIS — M50.20 CERVICAL DISC HERNIATION: Primary | ICD-10-CM

## 2020-01-23 DIAGNOSIS — R29.898 WEAKNESS OF RIGHT ARM: ICD-10-CM

## 2020-01-23 PROCEDURE — 97535 SELF CARE MNGMENT TRAINING: CPT | Performed by: PHYSICAL THERAPIST

## 2020-01-23 PROCEDURE — 97162 PT EVAL MOD COMPLEX 30 MIN: CPT | Performed by: PHYSICAL THERAPIST

## 2020-01-23 NOTE — PROGRESS NOTES
PT Evaluation     Today's date: 2020  Patient name: Gladis Patiño  : 1995  MRN: [id number]  Referring provider: Mehdi Velasquez MD  Dx:   Encounter Diagnosis     ICD-10-CM    1  Cervical disc herniation M50 20 Ambulatory referral to Physical Therapy   2  Weakness of right arm R29 898 Ambulatory referral to Physical Therapy                  Assessment  Assessment details: Patient is a 30year old male who presents to PT with c/o right UE weakness  PT examination shows limitations including weakness in right UE, decreased postural awareness, muscle spasm and tightness in right upper back, and increased discomfort t/o right UE  Patient would benefit from PT intervention including strengthening and stability training, postural training, functional exercises, manual therapy, HEP and pain relieving modalities in order to maximize functional independence  Impairments: abnormal muscle tone, activity intolerance, impaired physical strength, lacks appropriate home exercise program, pain with function and poor posture     Goals  ST  Initiate HEP  2  Patient to increase right UE strength to 4+/5 in 2 weeks    LT  Patient to report no discomfort or pulling in right UE and upper back in 4 weeks  2  Patient to increase right UE strength to 5/5 in 4 weeks  3   Patient to return to normal work and recreation activity without issue in 4 weeks    Plan  Patient would benefit from: PT eval and skilled physical therapy  Planned modality interventions: cryotherapy, thermotherapy: hydrocollator packs, ultrasound and unattended electrical stimulation  Other planned modality interventions: Modalities PRN  Planned therapy interventions: joint mobilization, manual therapy, massage, patient education, postural training, strengthening, stretching, therapeutic activities, therapeutic exercise, therapeutic training, functional ROM exercises, flexibility, graded activity, graded exercise and home exercise program  Frequency: 2x week  Duration in visits: 8  Duration in weeks: 4  Treatment plan discussed with: patient        Subjective Evaluation    History of Present Illness  Date of onset: 2018  Mechanism of injury: Patient presents to PT with c/o weakness in right arm  Patient reports he has had radicular pain and numbness that began 2 years ago  Patient within the last few months was seen by MD  He had an MRI 7 weeks ago which showed herniated disc at C6-C7  Patient states he continued to work and use his arm and his symptoms have subsided  He now c/o weakness in right arm and slight tingling in his right fingertips  Patient was seen by MD last week and he wants him to work on getting his strength back  Patient is now referred to oppt  Pain  At best pain ratin  At worst pain ratin  Quality: pulling  Progression: improved      Diagnostic Tests  MRI studies: abnormal (Disc Herniation at C6-C7)  Patient Goals  Patient goals for therapy: increased strength          Objective     Static Posture     Head  Forward  Shoulders  Rounded  Palpation     Right   Hypertonic in the levator scapulae and upper trapezius  Tenderness of the levator scapulae and upper trapezius  Neurological Testing     Sensation   Cervical/Thoracic     Right   Intact: light touch  Paresthesia: light touch    Additional Neurological Details  Slight numbness in right fingertips  Slightly hypoactive reflex in C5-C7    Active Range of Motion   Cervical/Thoracic Spine     Normal active range of motion    Strength/Myotome Testing     Right Shoulder     Planes of Motion   Flexion: 4+   Abduction: 4   External rotation at 0°: 4   Internal rotation at 0°: 4     Right Elbow   Flexion: 4  Extension: 4    Right Wrist/Hand   Wrist extension: 4  Wrist flexion: 4    Tests   Cervical     Left   Negative Spurling's Test A  Right   Negative Spurling's Test A       General Comments:    Upper quarter screen   Shoulder: unremarkable  Elbow: unremarkable  Hand/wrist: unremarkable             Precautions: None              Manual                                                     Exercise Diary  1/24/20       UBE-Retro        MTP/LTP        Prone Pivot        ER/IR        Shrugs        Retractions        Bicep Curls        Tricep Extension        MAGDALENA Rows        Horizontal Abd with TB        Scaption                                                                                    Modalities

## 2020-01-27 ENCOUNTER — OFFICE VISIT (OUTPATIENT)
Dept: PHYSICAL THERAPY | Facility: CLINIC | Age: 25
End: 2020-01-27
Payer: COMMERCIAL

## 2020-01-27 DIAGNOSIS — M50.20 CERVICAL DISC HERNIATION: Primary | ICD-10-CM

## 2020-01-27 PROCEDURE — 97112 NEUROMUSCULAR REEDUCATION: CPT | Performed by: PHYSICAL THERAPIST

## 2020-01-27 PROCEDURE — 97110 THERAPEUTIC EXERCISES: CPT | Performed by: PHYSICAL THERAPIST

## 2020-01-27 NOTE — PROGRESS NOTES
Daily Note     Today's date: 2020  Patient name: Marlon Smith  : 1995  MRN: [id number]  Referring provider: Maryam Agarwal MD  Dx:   Encounter Diagnosis     ICD-10-CM    1  Cervical disc herniation M50 20                   Subjective: "I'm doing ok  A little sore from using my arm at work today"  Objective: See treatment diary below  Precautions: None              Manual                                                                                          Exercise Diary  20         UBE-Retro    8 min         MTP/LTP    GTB 2x10         Prone Pivot    GTB 2x10         ER/IR    GTB 2x10         Shrugs    6# 2x10         Retractions    6# 2x10         Bicep Curls    6# 2x10         Tricep Extension    GTB 2x10         MAGDALENA Rows    6# 2x10         Horizontal Abd with TB    GTB 2x10         Scaption    2# 2x10          Tricep Kickbacks    6# 2x10                                                                                                                               Modalities                                                             Assessment: Patient with good tolerance to first treatment today  Patient required minimal VC's for correct performance of TE  Patient with fatigue in right UE with therex today  Plan: Continue per plan of care        Precautions: None

## 2020-01-30 ENCOUNTER — OFFICE VISIT (OUTPATIENT)
Dept: PHYSICAL THERAPY | Facility: CLINIC | Age: 25
End: 2020-01-30
Payer: COMMERCIAL

## 2020-01-30 DIAGNOSIS — R29.898 WEAKNESS OF RIGHT ARM: ICD-10-CM

## 2020-01-30 DIAGNOSIS — M50.20 CERVICAL DISC HERNIATION: Primary | ICD-10-CM

## 2020-01-30 PROCEDURE — 97112 NEUROMUSCULAR REEDUCATION: CPT

## 2020-01-30 PROCEDURE — 97110 THERAPEUTIC EXERCISES: CPT

## 2020-01-30 NOTE — PROGRESS NOTES
Daily Note     Today's date: 2020  Patient name: Daina Hooks  : 1995  MRN: [id number]  Referring provider: Onelia Kirk MD  Dx:   Encounter Diagnosis     ICD-10-CM    1  Cervical disc herniation M50 20    2  Weakness of right arm R29 898                   Subjective: Patient reports "I just feel weaker "      Objective: See treatment diary below      Assessment: Tolerated treatment well  Patient has significant posterior shoulder and scapular muscle loss on the right  Plan: Continue per plan of care        Precautions: None    Manual                                                                                          Exercise Diary  20       UBE-Retro    8 min  8 min       MTP/LTP    GTB 2x10  blk 2x10       Prone Pivot    GTB 2x10  GTB 2x10       ER/IR    GTB 2x10  GTB 2x10       Shrugs    6# 2x10  15# 2x10       Retractions    6# 2x10  10# 2x10       Bicep Curls    6# 2x10  10# 2x10       Tricep Extension    GTB 2x10  blk 2x10       MAGDALENA Rows    6# 2x10  15# 2x10       Horizontal Abd with TB    GTB 2x10  GTB 2x10       Scaption    2# 2x10  3# 2x10        Tricep Kickbacks    6# 2x10  10# 2x10        prone scaption      10x        prone 90-90      10x        prone horizontal abd      10x                                                                                   Modalities

## 2020-02-05 ENCOUNTER — APPOINTMENT (OUTPATIENT)
Dept: PHYSICAL THERAPY | Facility: CLINIC | Age: 25
End: 2020-02-05
Payer: COMMERCIAL

## 2020-02-06 ENCOUNTER — APPOINTMENT (OUTPATIENT)
Dept: PHYSICAL THERAPY | Facility: CLINIC | Age: 25
End: 2020-02-06
Payer: COMMERCIAL

## 2020-02-12 ENCOUNTER — APPOINTMENT (OUTPATIENT)
Dept: PHYSICAL THERAPY | Facility: CLINIC | Age: 25
End: 2020-02-12
Payer: COMMERCIAL

## 2020-02-13 ENCOUNTER — APPOINTMENT (OUTPATIENT)
Dept: PHYSICAL THERAPY | Facility: CLINIC | Age: 25
End: 2020-02-13
Payer: COMMERCIAL

## 2020-02-19 ENCOUNTER — OFFICE VISIT (OUTPATIENT)
Dept: PHYSICAL THERAPY | Facility: CLINIC | Age: 25
End: 2020-02-19
Payer: COMMERCIAL

## 2020-02-19 DIAGNOSIS — M50.20 CERVICAL DISC HERNIATION: Primary | ICD-10-CM

## 2020-02-19 DIAGNOSIS — R29.898 WEAKNESS OF RIGHT ARM: ICD-10-CM

## 2020-02-19 PROCEDURE — 97110 THERAPEUTIC EXERCISES: CPT

## 2020-02-19 PROCEDURE — 97112 NEUROMUSCULAR REEDUCATION: CPT

## 2020-02-19 NOTE — PROGRESS NOTES
Daily Note     Today's date: 2020  Patient name: Werner Moreno  : 1995  MRN: [id number]  Referring provider: Akhil Faith MD  Dx:   Encounter Diagnosis     ICD-10-CM    1  Cervical disc herniation M50 20    2  Weakness of right arm R29 898                   Subjective: Patient reports no pain or tingling in right arm   "I have a little weakness "      Objective: See treatment diary below      Assessment: Tolerated treatment well  Patient exhibited good technique with therapeutic exercises      Plan: Continue per plan of care  Precautions: None    Manual                                                                                          Exercise Diary  20     UBE-Retro    8 min  8 min  10 min     MTP/LTP    GTB 2x10  blk 2x10  d/c     Prone Pivot    GTB 2x10  GTB 2x10  d/c     ER/IR    GTB 2x10  GTB 2x10  7 5# 2x10 each     Shrugs    6# 2x10  15# 2x10  15# 2x10     Retractions    6# 2x10  10# 2x10  d/c     Bicep Curls    6# 2x10  10# 2x10  10# 2x10     Tricep Extension    GTB 2x10  blk 2x10  22  5# 2x10     MAGDALENA Rows    6# 2x10  15# 2x10  20# 2x10     Horizontal Abd with TB    GTB 2x10  GTB 2x10  d/c     Scaption    2# 2x10  3# 2x10  d/c      Tricep Kickbacks    6# 2x10  10# 2x10  d/c      prone scaption      10x  d/c      prone 90-90      10x  d/c      prone horizontal abd      10x  d/c      seated row        50# 2x10      lat pulldown        60# 2x10      t raises-         2x10  2 5#      Y raises        2x10  2 5#      W raises        2x10  2 5#           Modalities

## 2020-03-31 NOTE — PROGRESS NOTES
Patient seen for 4 total visits of PT  Last visit 2/19/20  Patient with difficulty making PT appointments due to work schedule  Patient to be discharged at this time due to script expiration

## 2020-10-08 ENCOUNTER — TELEMEDICINE (OUTPATIENT)
Dept: FAMILY MEDICINE CLINIC | Facility: CLINIC | Age: 25
End: 2020-10-08
Payer: COMMERCIAL

## 2020-10-08 VITALS — HEIGHT: 68 IN | WEIGHT: 220 LBS | TEMPERATURE: 97.2 F | BODY MASS INDEX: 33.34 KG/M2

## 2020-10-08 DIAGNOSIS — J01.00 ACUTE NON-RECURRENT MAXILLARY SINUSITIS: Primary | ICD-10-CM

## 2020-10-08 PROCEDURE — 99213 OFFICE O/P EST LOW 20 MIN: CPT | Performed by: PHYSICIAN ASSISTANT

## 2020-10-08 PROCEDURE — 4004F PT TOBACCO SCREEN RCVD TLK: CPT | Performed by: PHYSICIAN ASSISTANT

## 2020-10-08 RX ORDER — AMOXICILLIN AND CLAVULANATE POTASSIUM 875; 125 MG/1; MG/1
1 TABLET, FILM COATED ORAL EVERY 12 HOURS SCHEDULED
Qty: 14 TABLET | Refills: 0 | Status: SHIPPED | OUTPATIENT
Start: 2020-10-08 | End: 2020-10-15

## 2021-03-12 ENCOUNTER — ANESTHESIA (INPATIENT)
Dept: PERIOP | Facility: HOSPITAL | Age: 26
DRG: 343 | End: 2021-03-12
Payer: COMMERCIAL

## 2021-03-12 ENCOUNTER — APPOINTMENT (EMERGENCY)
Dept: CT IMAGING | Facility: HOSPITAL | Age: 26
DRG: 343 | End: 2021-03-12
Payer: COMMERCIAL

## 2021-03-12 ENCOUNTER — HOSPITAL ENCOUNTER (INPATIENT)
Facility: HOSPITAL | Age: 26
LOS: 1 days | Discharge: HOME | DRG: 343 | End: 2021-03-12
Attending: EMERGENCY MEDICINE | Admitting: SURGERY
Payer: COMMERCIAL

## 2021-03-12 ENCOUNTER — ANESTHESIA EVENT (INPATIENT)
Dept: PERIOP | Facility: HOSPITAL | Age: 26
DRG: 343 | End: 2021-03-12
Payer: COMMERCIAL

## 2021-03-12 VITALS
TEMPERATURE: 98.1 F | HEART RATE: 81 BPM | SYSTOLIC BLOOD PRESSURE: 126 MMHG | RESPIRATION RATE: 18 BRPM | DIASTOLIC BLOOD PRESSURE: 80 MMHG | OXYGEN SATURATION: 94 % | WEIGHT: 216.71 LBS | BODY MASS INDEX: 32.84 KG/M2 | HEIGHT: 68 IN

## 2021-03-12 DIAGNOSIS — K35.30 ACUTE APPENDICITIS WITH LOCALIZED PERITONITIS, WITHOUT PERFORATION, ABSCESS, OR GANGRENE: ICD-10-CM

## 2021-03-12 DIAGNOSIS — K35.80 ACUTE APPENDICITIS: Primary | ICD-10-CM

## 2021-03-12 PROBLEM — E66.9 CLASS 1 OBESITY IN ADULT: Status: ACTIVE | Noted: 2021-03-12

## 2021-03-12 PROBLEM — IMO0001 SMOKING: Status: ACTIVE | Noted: 2021-03-12

## 2021-03-12 PROBLEM — F17.200 SMOKING: Status: ACTIVE | Noted: 2021-03-12

## 2021-03-12 PROBLEM — E66.811 CLASS 1 OBESITY IN ADULT: Status: ACTIVE | Noted: 2021-03-12

## 2021-03-12 LAB
ALBUMIN SERPL BCP-MCNC: 4 G/DL (ref 3.5–5)
ALP SERPL-CCNC: 69 U/L (ref 46–116)
ALT SERPL W P-5'-P-CCNC: 45 U/L (ref 12–78)
ANION GAP SERPL CALCULATED.3IONS-SCNC: 6 MMOL/L (ref 4–13)
APTT PPP: 26 SECONDS (ref 23–37)
AST SERPL W P-5'-P-CCNC: 19 U/L (ref 5–45)
BASOPHILS # BLD AUTO: 0.06 THOUSANDS/ΜL (ref 0–0.1)
BASOPHILS NFR BLD AUTO: 1 % (ref 0–1)
BILIRUB SERPL-MCNC: 0.4 MG/DL (ref 0.2–1)
BILIRUB UR QL STRIP: NEGATIVE
BUN SERPL-MCNC: 14 MG/DL (ref 5–25)
CALCIUM SERPL-MCNC: 9.5 MG/DL (ref 8.3–10.1)
CHLORIDE SERPL-SCNC: 104 MMOL/L (ref 100–108)
CLARITY UR: CLEAR
CO2 SERPL-SCNC: 30 MMOL/L (ref 21–32)
COLOR UR: NORMAL
CREAT SERPL-MCNC: 1.25 MG/DL (ref 0.6–1.3)
EOSINOPHIL # BLD AUTO: 0.23 THOUSAND/ΜL (ref 0–0.61)
EOSINOPHIL NFR BLD AUTO: 2 % (ref 0–6)
ERYTHROCYTE [DISTWIDTH] IN BLOOD BY AUTOMATED COUNT: 12.5 % (ref 11.6–15.1)
GFR SERPL CREATININE-BSD FRML MDRD: 78 ML/MIN/1.73SQ M
GLUCOSE SERPL-MCNC: 93 MG/DL (ref 65–140)
GLUCOSE UR STRIP-MCNC: NEGATIVE MG/DL
HCT VFR BLD AUTO: 46.3 % (ref 36.5–49.3)
HGB BLD-MCNC: 15.6 G/DL (ref 12–17)
HGB UR QL STRIP.AUTO: NEGATIVE
IMM GRANULOCYTES # BLD AUTO: 0.02 THOUSAND/UL (ref 0–0.2)
IMM GRANULOCYTES NFR BLD AUTO: 0 % (ref 0–2)
INR PPP: 0.92 (ref 0.84–1.19)
KETONES UR STRIP-MCNC: NEGATIVE MG/DL
LEUKOCYTE ESTERASE UR QL STRIP: NEGATIVE
LIPASE SERPL-CCNC: 140 U/L (ref 73–393)
LYMPHOCYTES # BLD AUTO: 3.6 THOUSANDS/ΜL (ref 0.6–4.47)
LYMPHOCYTES NFR BLD AUTO: 35 % (ref 14–44)
MCH RBC QN AUTO: 31.9 PG (ref 26.8–34.3)
MCHC RBC AUTO-ENTMCNC: 33.7 G/DL (ref 31.4–37.4)
MCV RBC AUTO: 95 FL (ref 82–98)
MONOCYTES # BLD AUTO: 0.65 THOUSAND/ΜL (ref 0.17–1.22)
MONOCYTES NFR BLD AUTO: 6 % (ref 4–12)
NEUTROPHILS # BLD AUTO: 5.68 THOUSANDS/ΜL (ref 1.85–7.62)
NEUTS SEG NFR BLD AUTO: 56 % (ref 43–75)
NITRITE UR QL STRIP: NEGATIVE
NRBC BLD AUTO-RTO: 0 /100 WBCS
PH UR STRIP.AUTO: 5.5 [PH]
PLATELET # BLD AUTO: 257 THOUSANDS/UL (ref 149–390)
PMV BLD AUTO: 9.2 FL (ref 8.9–12.7)
POTASSIUM SERPL-SCNC: 3.9 MMOL/L (ref 3.5–5.3)
PROT SERPL-MCNC: 7.5 G/DL (ref 6.4–8.2)
PROT UR STRIP-MCNC: NEGATIVE MG/DL
PROTHROMBIN TIME: 12.2 SECONDS (ref 11.6–14.5)
RBC # BLD AUTO: 4.89 MILLION/UL (ref 3.88–5.62)
SODIUM SERPL-SCNC: 140 MMOL/L (ref 136–145)
SP GR UR STRIP.AUTO: <=1.005 (ref 1–1.03)
UROBILINOGEN UR QL STRIP.AUTO: 0.2 E.U./DL
WBC # BLD AUTO: 10.24 THOUSAND/UL (ref 4.31–10.16)

## 2021-03-12 PROCEDURE — 83690 ASSAY OF LIPASE: CPT | Performed by: EMERGENCY MEDICINE

## 2021-03-12 PROCEDURE — 44970 LAPAROSCOPY APPENDECTOMY: CPT

## 2021-03-12 PROCEDURE — 0DTJ4ZZ RESECTION OF APPENDIX, PERCUTANEOUS ENDOSCOPIC APPROACH: ICD-10-PCS | Performed by: SURGERY

## 2021-03-12 PROCEDURE — 99285 EMERGENCY DEPT VISIT HI MDM: CPT

## 2021-03-12 PROCEDURE — 85025 COMPLETE CBC W/AUTO DIFF WBC: CPT | Performed by: EMERGENCY MEDICINE

## 2021-03-12 PROCEDURE — 85610 PROTHROMBIN TIME: CPT | Performed by: EMERGENCY MEDICINE

## 2021-03-12 PROCEDURE — 96374 THER/PROPH/DIAG INJ IV PUSH: CPT

## 2021-03-12 PROCEDURE — 44970 LAPAROSCOPY APPENDECTOMY: CPT | Performed by: SURGERY

## 2021-03-12 PROCEDURE — 99284 EMERGENCY DEPT VISIT MOD MDM: CPT | Performed by: EMERGENCY MEDICINE

## 2021-03-12 PROCEDURE — 99232 SBSQ HOSP IP/OBS MODERATE 35: CPT | Performed by: SURGERY

## 2021-03-12 PROCEDURE — 81003 URINALYSIS AUTO W/O SCOPE: CPT | Performed by: EMERGENCY MEDICINE

## 2021-03-12 PROCEDURE — 96375 TX/PRO/DX INJ NEW DRUG ADDON: CPT

## 2021-03-12 PROCEDURE — 85730 THROMBOPLASTIN TIME PARTIAL: CPT | Performed by: EMERGENCY MEDICINE

## 2021-03-12 PROCEDURE — 96361 HYDRATE IV INFUSION ADD-ON: CPT

## 2021-03-12 PROCEDURE — 88304 TISSUE EXAM BY PATHOLOGIST: CPT | Performed by: PATHOLOGY

## 2021-03-12 PROCEDURE — 80053 COMPREHEN METABOLIC PANEL: CPT | Performed by: EMERGENCY MEDICINE

## 2021-03-12 PROCEDURE — 36415 COLL VENOUS BLD VENIPUNCTURE: CPT | Performed by: EMERGENCY MEDICINE

## 2021-03-12 PROCEDURE — 74177 CT ABD & PELVIS W/CONTRAST: CPT

## 2021-03-12 RX ORDER — KETOROLAC TROMETHAMINE 30 MG/ML
30 INJECTION, SOLUTION INTRAMUSCULAR; INTRAVENOUS ONCE
Status: COMPLETED | OUTPATIENT
Start: 2021-03-12 | End: 2021-03-12

## 2021-03-12 RX ORDER — SUCCINYLCHOLINE/SOD CL,ISO/PF 100 MG/5ML
SYRINGE (ML) INTRAVENOUS AS NEEDED
Status: DISCONTINUED | OUTPATIENT
Start: 2021-03-12 | End: 2021-03-12

## 2021-03-12 RX ORDER — MIDAZOLAM HYDROCHLORIDE 2 MG/2ML
INJECTION, SOLUTION INTRAMUSCULAR; INTRAVENOUS AS NEEDED
Status: DISCONTINUED | OUTPATIENT
Start: 2021-03-12 | End: 2021-03-12

## 2021-03-12 RX ORDER — CEFAZOLIN SODIUM 2 G/50ML
SOLUTION INTRAVENOUS AS NEEDED
Status: DISCONTINUED | OUTPATIENT
Start: 2021-03-12 | End: 2021-03-12

## 2021-03-12 RX ORDER — FENTANYL CITRATE 50 UG/ML
INJECTION, SOLUTION INTRAMUSCULAR; INTRAVENOUS AS NEEDED
Status: DISCONTINUED | OUTPATIENT
Start: 2021-03-12 | End: 2021-03-12

## 2021-03-12 RX ORDER — HYDROMORPHONE HCL/PF 1 MG/ML
0.5 SYRINGE (ML) INJECTION
Status: DISCONTINUED | OUTPATIENT
Start: 2021-03-12 | End: 2021-03-12 | Stop reason: HOSPADM

## 2021-03-12 RX ORDER — SODIUM CHLORIDE, SODIUM LACTATE, POTASSIUM CHLORIDE, CALCIUM CHLORIDE 600; 310; 30; 20 MG/100ML; MG/100ML; MG/100ML; MG/100ML
INJECTION, SOLUTION INTRAVENOUS CONTINUOUS PRN
Status: DISCONTINUED | OUTPATIENT
Start: 2021-03-12 | End: 2021-03-12

## 2021-03-12 RX ORDER — KETOROLAC TROMETHAMINE 30 MG/ML
INJECTION, SOLUTION INTRAMUSCULAR; INTRAVENOUS AS NEEDED
Status: DISCONTINUED | OUTPATIENT
Start: 2021-03-12 | End: 2021-03-12

## 2021-03-12 RX ORDER — DEXAMETHASONE SODIUM PHOSPHATE 4 MG/ML
INJECTION, SOLUTION INTRA-ARTICULAR; INTRALESIONAL; INTRAMUSCULAR; INTRAVENOUS; SOFT TISSUE AS NEEDED
Status: DISCONTINUED | OUTPATIENT
Start: 2021-03-12 | End: 2021-03-12

## 2021-03-12 RX ORDER — FENTANYL CITRATE/PF 50 MCG/ML
50 SYRINGE (ML) INJECTION
Status: DISCONTINUED | OUTPATIENT
Start: 2021-03-12 | End: 2021-03-12 | Stop reason: HOSPADM

## 2021-03-12 RX ORDER — OXYCODONE HYDROCHLORIDE AND ACETAMINOPHEN 5; 325 MG/1; MG/1
2 TABLET ORAL EVERY 4 HOURS PRN
Status: DISCONTINUED | OUTPATIENT
Start: 2021-03-12 | End: 2021-03-12 | Stop reason: HOSPADM

## 2021-03-12 RX ORDER — ONDANSETRON 2 MG/ML
4 INJECTION INTRAMUSCULAR; INTRAVENOUS ONCE
Status: COMPLETED | OUTPATIENT
Start: 2021-03-12 | End: 2021-03-12

## 2021-03-12 RX ORDER — MAGNESIUM HYDROXIDE 1200 MG/15ML
LIQUID ORAL AS NEEDED
Status: DISCONTINUED | OUTPATIENT
Start: 2021-03-12 | End: 2021-03-12 | Stop reason: HOSPADM

## 2021-03-12 RX ORDER — SODIUM CHLORIDE, SODIUM LACTATE, POTASSIUM CHLORIDE, CALCIUM CHLORIDE 600; 310; 30; 20 MG/100ML; MG/100ML; MG/100ML; MG/100ML
100 INJECTION, SOLUTION INTRAVENOUS CONTINUOUS
Status: DISCONTINUED | OUTPATIENT
Start: 2021-03-12 | End: 2021-03-12 | Stop reason: HOSPADM

## 2021-03-12 RX ORDER — CEFTRIAXONE 1 G/50ML
1000 INJECTION, SOLUTION INTRAVENOUS ONCE
Status: COMPLETED | OUTPATIENT
Start: 2021-03-12 | End: 2021-03-12

## 2021-03-12 RX ORDER — PROPOFOL 10 MG/ML
INJECTION, EMULSION INTRAVENOUS AS NEEDED
Status: DISCONTINUED | OUTPATIENT
Start: 2021-03-12 | End: 2021-03-12

## 2021-03-12 RX ORDER — HYDROMORPHONE HCL/PF 1 MG/ML
SYRINGE (ML) INJECTION AS NEEDED
Status: DISCONTINUED | OUTPATIENT
Start: 2021-03-12 | End: 2021-03-12

## 2021-03-12 RX ORDER — ONDANSETRON 2 MG/ML
INJECTION INTRAMUSCULAR; INTRAVENOUS AS NEEDED
Status: DISCONTINUED | OUTPATIENT
Start: 2021-03-12 | End: 2021-03-12

## 2021-03-12 RX ORDER — OXYCODONE HYDROCHLORIDE AND ACETAMINOPHEN 5; 325 MG/1; MG/1
1 TABLET ORAL EVERY 4 HOURS PRN
Qty: 15 TABLET | Refills: 0 | Status: SHIPPED | OUTPATIENT
Start: 2021-03-12 | End: 2021-03-22

## 2021-03-12 RX ORDER — MEPERIDINE HYDROCHLORIDE 25 MG/ML
25 INJECTION INTRAMUSCULAR; INTRAVENOUS; SUBCUTANEOUS ONCE AS NEEDED
Status: DISCONTINUED | OUTPATIENT
Start: 2021-03-12 | End: 2021-03-12 | Stop reason: HOSPADM

## 2021-03-12 RX ORDER — BUPIVACAINE HYDROCHLORIDE 5 MG/ML
INJECTION, SOLUTION EPIDURAL; INTRACAUDAL AS NEEDED
Status: DISCONTINUED | OUTPATIENT
Start: 2021-03-12 | End: 2021-03-12 | Stop reason: HOSPADM

## 2021-03-12 RX ORDER — LIDOCAINE HYDROCHLORIDE 10 MG/ML
INJECTION, SOLUTION EPIDURAL; INFILTRATION; INTRACAUDAL; PERINEURAL AS NEEDED
Status: DISCONTINUED | OUTPATIENT
Start: 2021-03-12 | End: 2021-03-12

## 2021-03-12 RX ADMIN — IOHEXOL 100 ML: 350 INJECTION, SOLUTION INTRAVENOUS at 04:04

## 2021-03-12 RX ADMIN — ONDANSETRON 4 MG: 2 INJECTION INTRAMUSCULAR; INTRAVENOUS at 03:11

## 2021-03-12 RX ADMIN — MIDAZOLAM HYDROCHLORIDE 2 MG: 1 INJECTION, SOLUTION INTRAMUSCULAR; INTRAVENOUS at 11:45

## 2021-03-12 RX ADMIN — LIDOCAINE HYDROCHLORIDE 50 MG: 10 INJECTION, SOLUTION EPIDURAL; INFILTRATION; INTRACAUDAL; PERINEURAL at 11:49

## 2021-03-12 RX ADMIN — FENTANYL CITRATE 100 MCG: 50 INJECTION, SOLUTION INTRAMUSCULAR; INTRAVENOUS at 11:49

## 2021-03-12 RX ADMIN — KETOROLAC TROMETHAMINE 30 MG: 30 INJECTION, SOLUTION INTRAMUSCULAR at 03:11

## 2021-03-12 RX ADMIN — CEFAZOLIN SODIUM 2000 MG: 2 SOLUTION INTRAVENOUS at 11:45

## 2021-03-12 RX ADMIN — SODIUM CHLORIDE, SODIUM LACTATE, POTASSIUM CHLORIDE, AND CALCIUM CHLORIDE: .6; .31; .03; .02 INJECTION, SOLUTION INTRAVENOUS at 11:30

## 2021-03-12 RX ADMIN — METRONIDAZOLE 500 MG: 500 INJECTION, SOLUTION INTRAVENOUS at 04:50

## 2021-03-12 RX ADMIN — Medication 160 MG: at 11:49

## 2021-03-12 RX ADMIN — OXYCODONE HYDROCHLORIDE AND ACETAMINOPHEN 2 TABLET: 5; 325 TABLET ORAL at 14:39

## 2021-03-12 RX ADMIN — SODIUM CHLORIDE 1000 ML: 0.9 INJECTION, SOLUTION INTRAVENOUS at 03:11

## 2021-03-12 RX ADMIN — PIPERACILLIN AND TAZOBACTAM 3.38 G: 3; .375 INJECTION, POWDER, LYOPHILIZED, FOR SOLUTION INTRAVENOUS at 16:47

## 2021-03-12 RX ADMIN — ONDANSETRON HYDROCHLORIDE 4 MG: 2 INJECTION, SOLUTION INTRAVENOUS at 11:52

## 2021-03-12 RX ADMIN — PROPOFOL 200 MG: 10 INJECTION, EMULSION INTRAVENOUS at 11:49

## 2021-03-12 RX ADMIN — CEFTRIAXONE 1000 MG: 1 INJECTION, SOLUTION INTRAVENOUS at 05:59

## 2021-03-12 RX ADMIN — DEXAMETHASONE SODIUM PHOSPHATE 4 MG: 4 INJECTION, SOLUTION INTRAMUSCULAR; INTRAVENOUS at 11:52

## 2021-03-12 RX ADMIN — HYDROMORPHONE HYDROCHLORIDE 0.5 MG: 1 INJECTION, SOLUTION INTRAMUSCULAR; INTRAVENOUS; SUBCUTANEOUS at 12:04

## 2021-03-12 RX ADMIN — KETOROLAC TROMETHAMINE 30 MG: 30 INJECTION, SOLUTION INTRAMUSCULAR; INTRAVENOUS at 12:23

## 2021-03-12 NOTE — ANESTHESIA PREPROCEDURE EVALUATION
Procedure:  APPENDECTOMY LAPAROSCOPIC (N/A Abdomen)    Relevant Problems   ANESTHESIA (within normal limits)      CARDIO (within normal limits)      NEURO/PSYCH   (+) Generalized anxiety disorder      PULMONARY   (+) Smoking      Other   (+) Acute appendicitis   (+) Cervical radiculopathy   (+) Class 1 obesity in adult        Physical Exam    Airway    Mallampati score: II  TM Distance: >3 FB  Neck ROM: full     Dental   No notable dental hx     Cardiovascular      Pulmonary      Other Findings        Anesthesia Plan  ASA Score- 2 Emergent    Anesthesia Type-         Additional Monitors:   Airway Plan: ETT  Plan Factors-Exercise tolerance (METS): >4 METS  Chart reviewed  EKG reviewed  Existing labs reviewed  Patient summary reviewed  Patient is a current smoker  Patient did not smoke on day of surgery  Induction- intravenous  Postoperative Plan-     Informed Consent- Anesthetic plan and risks discussed with patient  I personally reviewed this patient with the CRNA  Discussed and agreed on the Anesthesia Plan with the CRNA  Dilshad Renner

## 2021-03-12 NOTE — PLAN OF CARE
Problem: PAIN - ADULT  Goal: Verbalizes/displays adequate comfort level or baseline comfort level  Description: Interventions:  - Encourage patient to monitor pain and request assistance  - Assess pain using appropriate pain scale  - Administer analgesics based on type and severity of pain and evaluate response  - Implement non-pharmacological measures as appropriate and evaluate response  - Consider cultural and social influences on pain and pain management  - Notify physician/advanced practitioner if interventions unsuccessful or patient reports new pain  Outcome: Progressing     Problem: INFECTION - ADULT  Goal: Absence or prevention of progression during hospitalization  Description: INTERVENTIONS:  - Assess and monitor for signs and symptoms of infection  - Monitor lab/diagnostic results  - Monitor all insertion sites, i e  indwelling lines, tubes, and drains  - Monitor endotracheal if appropriate and nasal secretions for changes in amount and color  - [place] appropriate cooling/warming therapies per order  - Administer medications as ordered  - Instruct and encourage patient and family to use good hand hygiene technique  - Identify and instruct in appropriate isolation precautions for identified infection/condition  Outcome: Progressing     Problem: SAFETY ADULT  Goal: Patient will remain free of falls  Description: INTERVENTIONS:  - Assess patient frequently for physical needs  -  Identify cognitive and physical deficits and behaviors that affect risk of falls    -  [place] fall precautions as indicated by assessment   - Educate patient/family on patient safety including physical limitations  - Instruct patient to call for assistance with activity based on assessment  - Modify environment to reduce risk of injury  - Consider OT/PT consult to assist with strengthening/mobility  Outcome: Progressing  Goal: Maintain or return to baseline ADL function  Description: INTERVENTIONS:  -  Assess patient's ability to carry out ADLs; assess patient's baseline for ADL function and identify physical deficits which impact ability to perform ADLs (bathing, care of mouth/teeth, toileting, grooming, dressing, etc )  - Assess/evaluate cause of self-care deficits   - Assess range of motion  - Assess patient's mobility; develop plan if impaired  - Assess patient's need for assistive devices and provide as appropriate  - Encourage maximum independence but intervene and supervise when necessary  - Involve family in performance of ADLs  - Assess for home care needs following discharge   - Consider OT consult to assist with ADL evaluation and planning for discharge  - Provide patient education as appropriate  Outcome: Progressing  Goal: Maintain or return mobility status to optimal level  Description: INTERVENTIONS:  - Assess patient's baseline mobility status (ambulation, transfers, stairs, etc )    - Identify cognitive and physical deficits and behaviors that affect mobility  - Identify mobility aids required to assist with transfers and/or ambulation (gait belt, sit-to-stand, lift, walker, cane, etc )  - [place] fall precautions as indicated by assessment  - Record patient progress and toleration of activity level on Mobility SBAR; progress patient to next Phase/Stage  - Instruct patient to call for assistance with activity based on assessment  - Consider rehabilitation consult to assist with strengthening/weightbearing, etc   Outcome: Progressing     Problem: DISCHARGE PLANNING  Goal: Discharge to home or other facility with appropriate resources  Description: INTERVENTIONS:  - Identify barriers to discharge w/patient and caregiver  - Arrange for needed discharge resources and transportation as appropriate  - Identify discharge learning needs (meds, wound care, etc )  - Arrange for interpretive services to assist at discharge as needed  - Refer to Case Management Department for coordinating discharge planning if the patient needs post-hospital services based on physician/advanced practitioner order or complex needs related to functional status, cognitive ability, or social support system  Outcome: Progressing     Problem: Knowledge Deficit  Goal: Patient/family/caregiver demonstrates understanding of disease process, treatment plan, medications, and discharge instructions  Description: Complete learning assessment and assess knowledge base    Interventions:  - Provide teaching at level of understanding  - Provide teaching via preferred learning methods  Outcome: Progressing     Problem: SKIN/TISSUE INTEGRITY - ADULT  Goal: Incision(s), wounds(s) or drain site(s) healing without S/S of infection  Description: INTERVENTIONS  - Assess and document risk factors for skin impairment   - Assess and document dressing, incision, wound bed, drain sites and surrounding tissue  - Consider nutrition services referral as needed  - Oral mucous membranes remain intact  - Provide patient/ family education  Outcome: Progressing     Problem: HEMATOLOGIC - ADULT  Goal: Maintains hematologic stability  Description: INTERVENTIONS  - Assess for signs and symptoms of bleeding or hemorrhage  - Monitor labs  - Administer supportive blood products/factors as ordered and appropriate  Outcome: Progressing

## 2021-03-12 NOTE — OP NOTE
OPERATIVE REPORT  PATIENT NAME: Governor Jones  :  1995  MRN: [id number]  Pt Location: [facility] OR ROOM 02    SURGERY DATE: 3/12/2021    Surgeon(s) and Role:     * Marisel Santillan MD - Primary     * Cuauhtemoc Gillespie PA-C - Assisting  The PA was necessary to provide expert assistance; i e  in the form of providing optimal exposure with retraction, suturing, and assistance with dissection in order to perform the most efficient operation and in order to optimize patient safety in the abscence of a qualified surgical resident  Preop Diagnosis:  Acute appendicitis with localized peritonitis, without perforation, abscess, or gangrene [K35 30]    Post-Op Diagnosis Codes:     * Acute appendicitis with localized peritonitis, without perforation, abscess, or gangrene [K35 30]    Procedure(s) (LRB):  APPENDECTOMY LAPAROSCOPIC (N/A)    Specimen(s):  ID Type Source Tests Collected by Time Destination   1 :  Tissue Appendix TISSUE EXAM Marisel Santillan MD 3/12/2021 12:17 PM        Estimated Blood Loss:   Minimal    Drains:  Urethral Catheter Latex 16 Fr  (Active)   Number of days: 0       Anesthesia Type:   General    Operative Indications:  Acute appendicitis with localized peritonitis, without perforation, abscess, or gangrene [K35 30]  The patient is a pleasant 39-year-old male presenting with signs and symptoms of acute appendicitis for which definitive treatment by laparoscopic appendectomy is now indicated  Operative Findings: At the time of laparoscopy, 4 quadrants of the abdomen were inspected  The right upper quadrant was normal   The left upper quadrant was normal   The left lower quadrant was normal   The right lower quadrant harbor an acutely inflamed appendix  A laparoscopic appendectomy performed in the routine fashion after which the right lower quadrant wound inspected  The good hemostasis was found and the procedure completed uneventfully      Complications:   None    Procedure and Technique:  The patient was taken to the operating room where they were properly identified monitored and anesthetized  They received antibiotics perioperatively  Venodyne's used for DVT prophylaxis  Arenas catheter placed preoperatively  Abdomen prepped and draped under sterile conditions using aseptic technique  Timeout performed  Skin incised at the umbilicus  Peritoneal cavity entered bluntly with a Tressa clamp  12 mm trocar inserted and pneumoperitoneum created to 15 mmHg  5 mm 30° scope advanced  4 quadrants of the abdomen and inspected laparoscopically as well as the pelvis  Patient placed in steep Trendelenburg, left side down  2 additional working ports placed  These were 5 mm ports in the hypogastrium and left lower quadrant  The acutely inflamed appendix exposed  It was grasped  Window made in the mesoappendix  The mesoappendix divided with the Endo CAROL stapler  The base of the appendix controlled with the Endo CAROL stapler  The appendix placed in an Endobag and delivered through the umbilical trocar site  Pneumoperitoneum reestablished  The right lower quadrant wound irrigated and aspirated as was the pelvis  All pooled liquid aspirated out of the right upper quadrant  The patient flattened  Procedure completed with closure of the fascial defect at the umbilicus with a figure-of-eight suture of 0 Vicryl  Skin closed with subcuticular 4-0 Monocryl suture  Wounds infiltrated with half percent Marcaine and dressed sterilely  The patient extubated and taken to recovery in stable condition  Patient tolerated the procedure well         I was present for the entire procedure    Patient Disposition:  PACU     SIGNATURE: Judy Chase MD  DATE: March 12, 2021  TIME: 12:36 PM

## 2021-03-12 NOTE — ANESTHESIA POSTPROCEDURE EVALUATION
Post-Op Assessment Note    CV Status:  Stable  Pain Score: 0    Pain management: adequate     Mental Status:  Alert and awake   Hydration Status:  Euvolemic   PONV Controlled:  Controlled   Airway Patency:  Patent      Post Op Vitals Reviewed: Yes      Staff: Anesthesiologist, CRNA         No complications documented      BP   149/85   Temp   97 6   Pulse  120   Resp   20   SpO2   100%

## 2021-03-12 NOTE — UTILIZATION REVIEW
Initial Clinical Review    Admission: Date/Time/Statement:   Admission Orders (From admission, onward)     Ordered        03/12/21 0444  Inpatient Admission  Once                   Orders Placed This Encounter   Procedures    Inpatient Admission     Standing Status:   Standing     Number of Occurrences:   1     Order Specific Question:   Level of Care     Answer:   Med Surg [16]     Order Specific Question:   Bed Type     Answer:   Milka [4]     Order Specific Question:   Estimated length of stay     Answer:   More than 2 Midnights     Order Specific Question:   Certification     Answer:   I certify that inpatient services are medically necessary for this patient for a duration of greater than two midnights  See H&P and MD Progress Notes for additional information about the patient's course of treatment  ED Arrival Information     Expected Arrival Acuity Means of Arrival Escorted By Service Admission Type    - 3/12/2021 02:58 Urgent [facility]In [facility] Surgery-General Urgent    Arrival Complaint    Abdominal Pain        Chief Complaint   Patient presents with    Abdominal Pain     abdominal pain since 1am, stated it is only getting worse  Tried to have a bowel movement with no results  Assessment/Plan:   30 yom to ER from home c/o sudden onset of generalized abdominal pain at appx 10 pm last night - reports that he went to bed and was awakened by the pain at 1 am  Had nausea and reports that he "made himself vomit "  Admission work-up showing acute appendicitis  Admitted to inpatient status for acute appendicitis  To OR for: APPENDECTOMY LAPAROSCOPIC  Operative Indications:  Acute appendicitis with localized peritonitis  Operative Findings: At the time of laparoscopy, 4 quadrants of the abdomen were inspected  The right upper quadrant was normal   The left upper quadrant was normal   The left lower quadrant was normal   The right lower quadrant harbor an acutely inflamed appendix      ED Triage Vitals [03/12/21 0303]   Temperature Pulse Respirations Blood Pressure SpO2   (!) 97 °F (36 1 °C) 74 18 136/100 96 %      Temp Source Heart Rate Source Patient Position - Orthostatic VS BP Location FiO2 (%)   Temporal Monitor Sitting Left arm --      Pain Score       4          Wt Readings from Last 1 Encounters:   03/12/21 98 3 kg (216 lb 11 4 oz)     Additional Vital Signs:   Date/Time  Temp  Pulse  Resp  BP  MAP (mmHg)  SpO2  O2 Flow Rate (L/min)  O2 Device  Cardiac (WDL)  Patient Position - Orthostatic VS   03/12/21 1330  98 4 °F (36 9 °C)  75  16  144/71  --  97 %  --  None (Room air)  WDL  --   03/12/21 1315  --  79  12  135/66  --  97 %  --  --  --  --   03/12/21 1310  --  88  18  147/72  --  93 %  --  --  --  --   03/12/21 1305  --  126Abnormal   12  143/75  --  94 %  --  None (Room air)  --  --   03/12/21 1300  --  92  20  175/103Abnormal   --  100 %  --  --  --  --   03/12/21 1253  --  128Abnormal   20  149/85  --  100 %  --  --  --  --   03/12/21 1250  97 6 °F (36 4 °C)  116Abnormal   16  148/75  --  100 %  6 L/min  Simple mask  --  --   03/12/21 06:38:43  97 9 °F (36 6 °C)  69  20  128/80  96  96 %  --  None (Room air)  --  Lying   03/12/21 0542  --  --  --  --  --  96 %  --  None (Room air)  --  --   03/12/21 05:24:57  98 4 °F (36 9 °C)  76  18  145/96  112  96 %  --  None (Room air)  --  Lying   03/12/21 0430  --  79  18  116/65  84  94 %  --  None (Room air)  --  Lying   03/12/21 0415  --  85  16  117/72  87  95 %  --  None (Room air)  --  Lying   03/12/21 0345  --  --  --  --  --  --  --  None (Room air)  --  --   03/12/21 0330  --  73  16  127/79  96  96 %  --  None (Room air)  --  Lying   03/12/21 0303  97 °F (36 1 °C)Abnormal   74  18  136/100  --  96 %  --  None (Room air)  --  Sitting     Pertinent Labs/Diagnostic Test Results:   Results from last 7 days   Lab Units 03/12/21  0309   WBC Thousand/uL 10 24*   HEMOGLOBIN g/dL 15 6   HEMATOCRIT % 46 3   PLATELETS Thousands/uL 257   NEUTROS ABS Thousands/µL 5 68     Results from last 7 days   Lab Units 03/12/21  0309   SODIUM mmol/L 140   POTASSIUM mmol/L 3 9   CHLORIDE mmol/L 104   CO2 mmol/L 30   ANION GAP mmol/L 6   BUN mg/dL 14   CREATININE mg/dL 1 25   EGFR ml/min/1 73sq m 78   CALCIUM mg/dL 9 5     Results from last 7 days   Lab Units 03/12/21  0309   AST U/L 19   ALT U/L 45   ALK PHOS U/L 69   TOTAL PROTEIN g/dL 7 5   ALBUMIN g/dL 4 0   TOTAL BILIRUBIN mg/dL 0 40     Results from last 7 days   Lab Units 03/12/21  0309   GLUCOSE RANDOM mg/dL 93     Results from last 7 days   Lab Units 03/12/21  0209   PROTIME seconds 12 2   INR  0 92   PTT seconds 26     Results from last 7 days   Lab Units 03/12/21  0309   LIPASE u/L 140     Results from last 7 days   Lab Units 03/12/21  0628   CLARITY UA  Clear   COLOR UA  Light Yellow   SPEC GRAV UA  <=1 005   PH UA  5 5   GLUCOSE UA mg/dl Negative   KETONES UA mg/dl Negative   BLOOD UA  Negative   PROTEIN UA mg/dl Negative   NITRITE UA  Negative   BILIRUBIN UA  Negative   UROBILINOGEN UA E U /dl 0 2   LEUKOCYTES UA  Negative     3/12  Ct a/p=  Findings compatible with? Early acute appendicitis with small distal probable appendicolith      ED Treatment:   Medication Administration from 03/12/2021 0258 to 03/12/2021 0506       Date/Time Order Dose Route Action     03/12/2021 0311 ondansetron (ZOFRAN) injection 4 mg 4 mg Intravenous Given     03/12/2021 0311 ketorolac (TORADOL) injection 30 mg 30 mg Intravenous Given     03/12/2021 0311 sodium chloride 0 9 % bolus 1,000 mL 1,000 mL Intravenous New Bag     03/12/2021 0404 iohexol (OMNIPAQUE) 350 MG/ML injection (SINGLE-DOSE) 100 mL 100 mL Intravenous Given     03/12/2021 0450 metroNIDAZOLE (FLAGYL) IVPB (premix) 500 mg 100 mL 500 mg Intravenous New Bag        Past Medical History:   Diagnosis Date    Anxiety attack     last assessed 02/18/15    Back complaints     Palpitations     last assessed 03/19/15    Psychiatric disorder     Reactive airway disease last assessed 02/18/15    Syncope     last assessed 09/29/16    Tachyarrhythmia     last assessed 03/19/15     Present on Admission:   Acute appendicitis   Smoking   Class 1 obesity in adult    Admitting Diagnosis: Abdominal pain [R10 9]  Acute appendicitis [K35 80]  Age/Sex: 30 y o  male  Admission Orders:  NPO    Scheduled Medications:  piperacillin-tazobactam, 3 375 g, Intravenous, Q6H    Continuous IV Infusions:  lactated ringers, 100 mL/hr, Intravenous, Continuous    PRN Meds:  oxyCODONE-acetaminophen, 2 tablet, Oral, Q4H PRN    Network Utilization Review Department  ATTENTION: Please call with any questions or concerns to [phone] and carefully listen to the prompts so that you are directed to the right person  All voicemails are confidential   [employer] all requests for admission clinical reviews, approved or denied determinations and any other requests to dedicated fax number below belonging to the campus where the patient is receiving treatment   List of dedicated fax numbers for the Facilities:  [address] DENIALS (Administrative/Medical Necessity) [phone]   [address] (Maternity/NICU/Pediatrics) [phone]   [address] [place] [address] [address] Virginia Baron [phone] (Lisy Quinn "Anahy" 103) [address] [place] [address] [address] [place] [address]) [facility] [place] [place] [address] [phone]

## 2021-03-12 NOTE — CASE MANAGEMENT
Pt is not a 30 day readmission, pt was made aware of cm role, pt was made aware that he is here as an obs status, pt was admitted for appendicitis, pt lives with his girl friend, he stays at her home at times  Pt has 24 steps to his 2nd floor apartment, no steps inside, RX plan [facility], no hx of HHC, DME: none, hx of bipolar, pt smokes 1/4pkg  cig/day, pt denies any teaching he stated he knows what he needs to do, pt states he drinks 2-3 beers/day, pt denies any d/c needs, his family will transport the pt hoe when stable for d/c , pt is to have a lap appendectomy, cm will continue to follow for any additional d/c needs  CM reviewed d/c planning process including the following: identifying help at home, patient preference for d/c planning needs, availability of treatment team to discuss questions or concerns patient and/or family may have regarding understanding medications and recognizing signs and symptoms once discharged  CM also encouraged patient to follow up with all recommended appointments after discharge  Patient advised of importance for patient and family to participate in managing patients medical well being  Patient/caregiver received discharge checklist   Content reviewed  Patient/caregiver encouraged to participate in discharge plan of care prior to discharge home

## 2021-03-12 NOTE — PLAN OF CARE
Problem: PAIN - ADULT  Goal: Verbalizes/displays adequate comfort level or baseline comfort level  Description: Interventions:  - Encourage patient to monitor pain and request assistance  - Assess pain using appropriate pain scale (0-10 pain scale)  - Administer analgesics based on type and severity of pain and evaluate response  - Implement non-pharmacological measures as appropriate and evaluate response  - Consider cultural and social influences on pain and pain management  - Notify physician/advanced practitioner if interventions unsuccessful or patient reports new pain  Outcome: Progressing     Problem: INFECTION - ADULT  Goal: Absence or prevention of progression during hospitalization  Description: INTERVENTIONS:  - Assess and monitor for signs and symptoms of infection  - Monitor lab/diagnostic results  - Monitor all insertion sites, i e  indwelling lines  - Administer medications as ordered  - Instruct and encourage patient and family to use good hand hygiene technique  Outcome: Progressing     Problem: SAFETY ADULT  Goal: Patient will remain free of falls  Description: INTERVENTIONS:  - Assess patient frequently for physical needs  -  Identify cognitive and physical deficits and behaviors that affect risk of falls  (surgery, pain, pain medication)  -  [place] fall precautions as indicated by assessment   (low fall risk)  - Educate patient/family on patient safety including physical limitations  - Instruct patient to call for assistance with activity based on assessment  - Modify environment to reduce risk of injury  - Consider OT/PT consult to assist with strengthening/mobility  Outcome: Progressing  Goal: Maintain or return to baseline ADL function  Description: INTERVENTIONS:  -  Assess patient's ability to carry out ADLs; (independent)   - Assess range of motion  - Assess patient's mobility; (independent)  - Encourage maximum independence but intervene and supervise when necessary  - Assess for home care needs following discharge   - Consider OT consult to assist with ADL evaluation and planning for discharge  - Provide patient education as appropriate  Outcome: Progressing  Goal: Maintain or return mobility status to optimal level  Description: INTERVENTIONS:  - Assess patient's baseline mobility status (independent)    - Identify cognitive and physical deficits and behaviors that affect mobility(surgery, pain, pain medication)  - [place] fall precautions as indicated by assessment(low fall risk)  - Record patient progress and toleration of activity level on Mobility SBAR; progress patient to next Phase/Stage  - Instruct patient to call for assistance with activity based on assessment  - Consider rehabilitation consult to assist with strengthening/weightbearing, etc   Outcome: Progressing     Problem: DISCHARGE PLANNING  Goal: Discharge to home or other facility with appropriate resources  Description: INTERVENTIONS:  - Identify barriers to discharge w/patient and caregiver  - Arrange for needed discharge resources and transportation as appropriate  - Identify discharge learning needs (meds, wound care, etc )  - Refer to Case Management Department for coordinating discharge planning if the patient needs post-hospital services based on physician/advanced practitioner order or complex needs related to functional status, cognitive ability, or social support system  Outcome: Progressing     Problem: Knowledge Deficit  Goal: Patient/family/caregiver demonstrates understanding of disease process, treatment plan, medications, and discharge instructions  Description: Complete learning assessment and assess knowledge base    Interventions:  - Provide teaching at level of understanding  - Provide teaching via preferred learning methods  Outcome: Progressing     Problem: SKIN/TISSUE INTEGRITY - ADULT  Goal: Incision(s), wounds(s) or drain site(s) healing without S/S of infection  Description: INTERVENTIONS  - Assess and document risk factors for skin impairment   - Assess and document dressing, incision, wound bed, drain sites and surrounding tissue  - Consider nutrition services referral as needed  - Provide patient/ family education  Outcome: Progressing     Problem: HEMATOLOGIC - ADULT  Goal: Maintains hematologic stability  Description: INTERVENTIONS  - Assess for signs and symptoms of bleeding or hemorrhage  - Monitor labs  - Administer supportive blood products/factors as ordered and appropriate  Outcome: Progressing     Problem: Nutrition/Hydration-ADULT  Goal: Nutrient/Hydration intake appropriate for improving, restoring or maintaining nutritional needs  Description: Monitor and assess patient's nutrition/hydration status for malnutrition  Collaborate with interdisciplinary team and initiate plan and interventions as ordered  Monitor patient's weight and dietary intake as ordered or per policy  Utilize nutrition screening tool and intervene as necessary  Determine patient's food preferences and provide high-protein, high-caloric foods as appropriate       INTERVENTIONS:  - Monitor oral intake, urinary output, labs, and treatment plans  - Assess nutrition and hydration status and recommend course of action  - Evaluate amount of meals eaten  - Assist patient with eating if necessary   - Allow adequate time for meals  - Recommend/ encourage appropriate diets, oral nutritional supplements, and vitamin/mineral supplements  - Provide specific nutrition/hydration education as appropriate  - Include patient/family/caregiver in decisions related to nutrition  Outcome: Progressing

## 2021-03-12 NOTE — CASE MANAGEMENT
Pt is being discharged today  [facility] to arrange for a follow up appointment  AVS and discharge instructions reviewed with patient with good understanding

## 2021-03-12 NOTE — DISCHARGE INSTRUCTIONS
Sarai 48 Discharge Instructions    1  No driving 1 week     2  No strenuous activity for 2 weeks  3   If you have a dressing on your skin, remove it in 2 days and then you may shower over the wound with soap and water  4   Notify the office for development of fevers, chills, worsening pain, loss of appetite  5   Call [phone] with any questions or concerns  Appendicitis   WHAT YOU SHOULD KNOW:   Appendicitis is inflammation of the appendix  The appendix is a small pouch that is attached to the large intestine on the lower right side of the abdomen  AFTER YOU LEAVE:   Medicines:   · Pain medicine: You may be given medicine to take away or decrease pain  Do not wait until the pain is severe before you take your medicine  · Antibiotics: This medicine is given to fight or prevent an infection caused by bacteria  Always take your antibiotics exactly as ordered by your healthcare provider  Do not stop taking your medicine unless directed by your healthcare provider  Never save antibiotics or take leftover antibiotics that were given to you for another illness  · Take your medicine as directed  Call your healthcare provider if you think your medicine is not helping or if you have side effects  Tell him if you are allergic to any medicine  Keep a list of the medicines, vitamins, and herbs you take  Include the amounts, and when and why you take them  Bring the list or the pill bottles to follow-up visits  Carry your medicine list with you in case of an emergency  Follow up with your healthcare provider in 2 weeks:  Write down your questions so you remember to ask them during your visits  Activity:  You may need to limit activity for 4 to 6 weeks after surgery  Ask your healthcare provider what activities you can do  Contact your healthcare provider if:   · You have abdominal pain that does not go away, even after you take medicine      · You have chills, a cough, or feel weak and achy  · You have trouble having a bowel movement or have diarrhea  · You have questions or concerns about your condition or care  Seek care immediately or call 911 if:   · You have a fever  · You have severe pain in your abdomen  · You are vomiting and cannot keep food down  © 2014 [address] is for End User's use only and may not be sold, redistributed or otherwise used for commercial purposes  All illustrations and images included in CareNotes® are the copyrighted property of [employer] M [employer]  or Sergio Jones  The above information is an  only  It is not intended as medical advice for individual conditions or treatments  Talk to your doctor, nurse or pharmacist before following any medical regimen to see if it is safe and effective for you

## 2021-03-12 NOTE — H&P
H&P Exam - General Surgery   Kevin Burk  30 y o  male MRN: [id number]  Unit/Bed#: 400-01 Encounter: [id number]    Assessment/Plan     Assessment:  Patient is a 29 yo male with no sig PMH presenting with signs and symptoms of acute appendicitis for which a laparoscopic appendectomy is now indicated  Plan:  The options, benefits, risks and alternatives to laparoscopic appendectomy were explained to the patient including the option for antibiotic therapy alone as an alternative to surgery  Risks related to anesthesia, bleeding, infection in 5-10% of cases necessitating additional surgical interventions were all explained in clear simple terms  All questions answered to the satisfaction of the patient and informed consent obtained to proceed  History of Present Illness      HPI:  Kevin Burk  is a 30 y o  male who presents with signs and symptoms of acute appendicitis       Review of Systems   Constitutional: Negative for chills and fever  HENT: Negative for ear pain and sore throat  Eyes: Negative for pain and visual disturbance  Respiratory: Negative for cough and shortness of breath  Cardiovascular: Negative for chest pain and palpitations  Gastrointestinal: Negative for abdominal pain and vomiting  Genitourinary: Negative for dysuria and hematuria  Musculoskeletal: Negative for arthralgias and back pain  Skin: Negative for color change and rash  Neurological: Negative for seizures and syncope  All other systems reviewed and are negative        Historical Information   Past Medical History:   Diagnosis Date    Anxiety attack     last assessed 02/18/15    Back complaints     Palpitations     last assessed 03/19/15    Psychiatric disorder     Reactive airway disease     last assessed 02/18/15    Syncope     last assessed 09/29/16    Tachyarrhythmia     last assessed 03/19/15     Past Surgical History:   Procedure Laterality Date    FRACTURE SURGERY      HIP SURGERY Right Social History   Social History     Substance and Sexual Activity   Alcohol Use Yes    Frequency: 2-3 times a week    Drinks per session: 1 or 2    Binge frequency: Less than monthly    Comment: WEEKENDS- social     Social History     Substance and Sexual Activity   Drug Use Never     Social History     Tobacco Use   Smoking Status Current Some Day Smoker    Packs/day: 0 25    Years: 15 00    Pack years: 3 75    Types: Cigarettes   Smokeless Tobacco Current User    Types: Chew     E-Cigarette/Vaping    E-Cigarette Use Never User      E-Cigarette/Vaping Substances    Nicotine No     THC No     CBD No     Flavoring No     Other No     Unknown No      Family History: non-contributory    Meds/Allergies   all medications and allergies reviewed  No Known Allergies    Objective   First Vitals:   Blood Pressure: 136/100 (03/12/21 0303)  Pulse: 74 (03/12/21 0303)  Temperature: (!) 97 °F (36 1 °C) (03/12/21 0303)  Temp Source: Temporal (03/12/21 0303)  Respirations: 18 (03/12/21 0303)  Height: 5' 9" (175 3 cm) (03/12/21 0303)  Weight - Scale: 99 7 kg (219 lb 12 8 oz) (03/12/21 0303)  SpO2: 96 % (03/12/21 0303)    Current Vitals:   Blood Pressure: 128/80 (03/12/21 0638)  Pulse: 69 (03/12/21 0638)  Temperature: 97 9 °F (36 6 °C) (03/12/21 0638)  Temp Source: Oral (03/12/21 6057)  Respirations: 20 (03/12/21 4255)  Height: 5' 8" (172 7 cm) (03/12/21 0524)  Weight - Scale: 98 3 kg (216 lb 11 4 oz) (03/12/21 0524)  SpO2: 96 % (03/12/21 5636)      Intake/Output Summary (Last 24 hours) at 3/12/2021 0753  Last data filed at 3/12/2021 9230  Gross per 24 hour   Intake 1000 ml   Output 350 ml   Net 650 ml       Invasive Devices     Peripheral Intravenous Line            Peripheral IV 03/12/21 Right Antecubital less than 1 day                Physical Exam  Vitals signs and nursing note reviewed  Constitutional:       Appearance: He is well-developed  HENT:      Head: Normocephalic and atraumatic     Eyes: Conjunctiva/sclera: Conjunctivae normal    Neck:      Musculoskeletal: Neck supple  Cardiovascular:      Rate and Rhythm: Normal rate and regular rhythm  Heart sounds: No murmur  Pulmonary:      Effort: Pulmonary effort is normal  No respiratory distress  Breath sounds: Normal breath sounds  Abdominal:      Palpations: Abdomen is soft  Tenderness: There is no abdominal tenderness  Skin:     General: Skin is warm and dry  Neurological:      Mental Status: He is alert  Lab Results: I have personally reviewed pertinent lab results  Imaging: I have personally reviewed pertinent reports  EKG, Pathology, and Other Studies: I have personally reviewed pertinent reports  Code Status: No Order  Advance Directive and Living Will:      Power of :    POLST:      Counseling / Coordination of Care  Total floor / unit time spent today 35 minutes  Greater than 50% of total time was spent with the patient and / or family counseling and / or coordination of care  A description of the counseling / coordination of care: 15

## 2021-03-12 NOTE — ED PROVIDER NOTES
History  Chief Complaint   Patient presents with    Abdominal Pain     abdominal pain since 1am, stated it is only getting worse  Tried to have a bowel movement with no results  This is a 30year old male that ambulated to the ED this morning accompanied by his wife reporting a sudden onset of generalized abdominal pain at appx 10 pm last night - reports that he went to bed and was awakened by the pain at 1 am  Had nausea and reports that he "made himself vomit "  Last meal was at 8 pm last evening which consisted of chicken wings  Denies diarrhea, fever or chills'  Rates the pain as between a 3 and 10 - reports that it comes in pangs    No prior hx of similar pain  Denies hx of pancreatitis  Social ETOH - denies use of recreational drugs  No identified aggravating or alleviating factors    Denies hx of abdominal surgeries or hx of SBO          Prior to Admission Medications   Prescriptions Last Dose Informant Patient Reported?  Taking?   fexofenadine (ALLEGRA) 180 MG tablet Past Month at Unknown time Self Yes Yes   Sig: Take 180 mg by mouth daily   naproxen (NAPROSYN) 500 mg tablet  Self No No   Sig: Take 1 tablet (500 mg total) by mouth 2 (two) times a day with meals   Patient not taking: Reported on 1/20/2020   sertraline (ZOLOFT) 25 mg tablet 3/11/2021 at Unknown time Self No Yes   Sig: Take 1 tablet (25 mg total) by mouth daily      Facility-Administered Medications: None       Past Medical History:   Diagnosis Date    Anxiety attack     last assessed 02/18/15    Back complaints     Palpitations     last assessed 03/19/15    Psychiatric disorder     Reactive airway disease     last assessed 02/18/15    Syncope     last assessed 09/29/16    Tachyarrhythmia     last assessed 03/19/15       Past Surgical History:   Procedure Laterality Date    HIP SURGERY Right        Family History   Problem Relation Age of Onset    No Known Problems Mother     No Known Problems Father      I have reviewed and agree with the history as documented  E-Cigarette/Vaping    E-Cigarette Use Never User      E-Cigarette/Vaping Substances    Nicotine No     THC No     CBD No     Flavoring No     Other No     Unknown No      Social History     Tobacco Use    Smoking status: Current Some Day Smoker     Packs/day: 0 25     Years: 15 00     Pack years: 3 75     Types: Cigarettes    Smokeless tobacco: Current User     Types: Chew   Substance Use Topics    Alcohol use: Yes     Frequency: 2-4 times a month     Comment: WEEKENDS- social    Drug use: Never       Review of Systems   Constitutional: Negative for activity change, appetite change, chills, diaphoresis, fatigue, fever and unexpected weight change  HENT: Negative for congestion, ear discharge, ear pain, mouth sores, sinus pressure, sinus pain, sneezing, sore throat, trouble swallowing and voice change  Eyes: Negative for photophobia, pain, discharge, redness, itching and visual disturbance  Respiratory: Negative for cough, chest tightness and shortness of breath  Cardiovascular: Negative for chest pain, palpitations and leg swelling  Gastrointestinal: Positive for abdominal pain, nausea and vomiting  Negative for constipation  See HPI   Endocrine: Negative for cold intolerance, heat intolerance, polydipsia, polyphagia and polyuria  Genitourinary: Negative for decreased urine volume, difficulty urinating, dysuria, frequency, hematuria and urgency  Musculoskeletal: Negative for arthralgias, back pain, gait problem, joint swelling, myalgias, neck pain and neck stiffness  Skin: Negative for color change and rash  Allergic/Immunologic: Negative for immunocompromised state  Neurological: Negative for dizziness, tremors, seizures, syncope, speech difficulty, weakness, light-headedness, numbness and headaches  Hematological: Does not bruise/bleed easily  Psychiatric/Behavioral: Negative for behavioral problems and suicidal ideas  Physical Exam  Physical Exam  Vitals signs and nursing note reviewed  Constitutional:       General: He is not in acute distress  Appearance: Normal appearance  He is well-developed and normal weight  He is not ill-appearing, toxic-appearing or diaphoretic  HENT:      Head: Normocephalic and atraumatic  Right Ear: Tympanic membrane, ear canal and external ear normal  There is no impacted cerumen  Left Ear: Tympanic membrane, ear canal and external ear normal  There is no impacted cerumen  Nose: No congestion or rhinorrhea  Mouth/Throat:      Mouth: Mucous membranes are moist       Pharynx: Oropharynx is clear  No oropharyngeal exudate or posterior oropharyngeal erythema  Eyes:      General: No scleral icterus  Right eye: No discharge  Left eye: No discharge  Extraocular Movements: Extraocular movements intact  Conjunctiva/sclera: Conjunctivae normal       Pupils: Pupils are equal, round, and reactive to light  Neck:      Musculoskeletal: Normal range of motion and neck supple  No neck rigidity or muscular tenderness  Vascular: No JVD  Trachea: No tracheal deviation  Cardiovascular:      Rate and Rhythm: Normal rate and regular rhythm  Heart sounds: Normal heart sounds  No murmur  Pulmonary:      Effort: Pulmonary effort is normal  No respiratory distress  Breath sounds: Normal breath sounds  No wheezing or rales  Chest:      Chest wall: No tenderness  Abdominal:      General: Bowel sounds are normal       Palpations: Abdomen is soft  There is no mass  Tenderness: There is generalized abdominal tenderness and tenderness in the right upper quadrant and right lower quadrant  There is guarding  There is no right CVA tenderness or left CVA tenderness  Hernia: No hernia is present  Musculoskeletal: Normal range of motion  General: No swelling, tenderness, deformity or signs of injury        Right lower leg: No edema  Left lower leg: No edema  Lymphadenopathy:      Cervical: No cervical adenopathy  Skin:     General: Skin is warm and dry  Capillary Refill: Capillary refill takes less than 2 seconds  Findings: No bruising, erythema or rash  Neurological:      General: No focal deficit present  Mental Status: He is alert and oriented to person, place, and time  Mental status is at baseline  Cranial Nerves: No cranial nerve deficit  Sensory: No sensory deficit  Motor: No weakness or abnormal muscle tone  Coordination: Coordination normal       Gait: Gait normal       Deep Tendon Reflexes: Reflexes normal    Psychiatric:         Mood and Affect: Mood normal          Behavior: Behavior normal          Thought Content:  Thought content normal          Judgment: Judgment normal          Vital Signs  ED Triage Vitals [03/12/21 0303]   Temperature Pulse Respirations Blood Pressure SpO2   (!) 97 °F (36 1 °C) 74 18 136/100 96 %      Temp Source Heart Rate Source Patient Position - Orthostatic VS BP Location FiO2 (%)   Temporal Monitor Sitting Left arm --      Pain Score       4           Vitals:    03/12/21 0303 03/12/21 0330 03/12/21 0415 03/12/21 0430   BP: 136/100 127/79 117/72 116/65   Pulse: 74 73 85 79   Patient Position - Orthostatic VS: Sitting Lying Lying Lying         Visual Acuity      ED Medications  Medications   cefTRIAXone (ROCEPHIN) IVPB (premix in dextrose) 1,000 mg 50 mL (has no administration in time range)   metroNIDAZOLE (FLAGYL) IVPB (premix) 500 mg 100 mL (500 mg Intravenous New Bag 3/12/21 0450)   ondansetron (ZOFRAN) injection 4 mg (4 mg Intravenous Given 3/12/21 0311)   ketorolac (TORADOL) injection 30 mg (30 mg Intravenous Given 3/12/21 0311)   sodium chloride 0 9 % bolus 1,000 mL (1,000 mL Intravenous New Bag 3/12/21 0311)   iohexol (OMNIPAQUE) 350 MG/ML injection (SINGLE-DOSE) 100 mL (100 mL Intravenous Given 3/12/21 0404)       Diagnostic Studies  Results Reviewed     Procedure Component Value Units Date/Time    Protime-INR [[id number]]     Lab Status: No result Specimen: Blood     APTT [[id number]]     Lab Status: No result Specimen: Blood     CMP [[id number]] Collected: 03/12/21 0309    Lab Status: Final result Specimen: Blood from Arm, Right Updated: 03/12/21 0339     Sodium 140 mmol/L      Potassium 3 9 mmol/L      Chloride 104 mmol/L      CO2 30 mmol/L      ANION GAP 6 mmol/L      BUN 14 mg/dL      Creatinine 1 25 mg/dL      Glucose 93 mg/dL      Calcium 9 5 mg/dL      AST 19 U/L      ALT 45 U/L      Alkaline Phosphatase 69 U/L      Total Protein 7 5 g/dL      Albumin 4 0 g/dL      Total Bilirubin 0 40 mg/dL      eGFR 78 ml/min/1 73sq m     Narrative:      National Kidney Disease Foundation guidelines for Chronic Kidney Disease (CKD):     Stage 1 with normal or high GFR (GFR > 90 mL/min/1 73 square meters)    Stage 2 Mild CKD (GFR = 60-89 mL/min/1 73 square meters)    Stage 3A Moderate CKD (GFR = 45-59 mL/min/1 73 square meters)    Stage 3B Moderate CKD (GFR = 30-44 mL/min/1 73 square meters)    Stage 4 Severe CKD (GFR = 15-29 mL/min/1 73 square meters)    Stage 5 End Stage CKD (GFR <15 mL/min/1 73 square meters)  Note: GFR calculation is accurate only with a steady state creatinine    Lipase [[id number]]  (Normal) Collected: 03/12/21 0309    Lab Status: Final result Specimen: Blood from Arm, Right Updated: 03/12/21 0333     Lipase 140 u/L     CBC and differential [[id number]]  (Abnormal) Collected: 03/12/21 0309    Lab Status: Final result Specimen: Blood from Arm, Right Updated: 03/12/21 0322     WBC 10 24 Thousand/uL      RBC 4 89 Million/uL      Hemoglobin 15 6 g/dL      Hematocrit 46 3 %      MCV 95 fL      MCH 31 9 pg      MCHC 33 7 g/dL      RDW 12 5 %      MPV 9 2 fL      Platelets 620 Thousands/uL      nRBC 0 /100 WBCs      Neutrophils Relative 56 %      Immat GRANS % 0 %      Lymphocytes Relative 35 %      Monocytes Relative 6 %      Eosinophils Relative 2 %      Basophils Relative 1 %      Neutrophils Absolute 5 68 Thousands/µL      Immature Grans Absolute 0 02 Thousand/uL      Lymphocytes Absolute 3 60 Thousands/µL      Monocytes Absolute 0 65 Thousand/µL      Eosinophils Absolute 0 23 Thousand/µL      Basophils Absolute 0 06 Thousands/µL     UA w Reflex to Microscopic w Reflex to Culture [[id number]]     Lab Status: No result Specimen: Urine, Clean Catch                  CT Abdomen pelvis with contrast   Final Result by Tanner Trujillo MD (03/12 3910)   Findings compatible with? Early acute appendicitis with small distal probable appendicolith  No evidence of perforation or abscess  Workstation performed: [id number]                 Procedures  Procedures         ED Course  ED Course as of Mar 12 0450   Fri Mar 12, 2021   0440 Ct resulted and interpreted by radiologist as follows:Mildly distended appendix measuring up to 8 mm in greatest transverse dimension with suggestion of small appendicolith distally  Trace minimal periappendiceal stranding is seen  TT sent to General Surgeon on call      7855 This 24-year-old male presents emergency department with a sudden onset of abdominal pain approximately 10:00 a m  Last night  The pain woke him at about 1:00 a m  As he states it was then he 10/10  Pain is generalized  On exam he had more tenderness of the right upper quadrant however knee exam the tenderness was more of the right lower quadrant  Patient does have some it an intentional guarding  Slight leukocytosis at 10 24 otherwise this CBC is normal   Hemoglobin hematocrit stable 46 3 and 15 6  Platelet count stable 257  CMP unremarkable with a normal renal function BUN of 14 creatinine 1 25  Lipase normal 140  Patient medicated with Zofran and ketorolac on arrival   Did not really require any additional pain medications  I did discuss diagnosed with patient need for admission and remain NPO  Patient verbalized understanding    I did tiger text Dr Vedia Dakin - general surgery-except the patient is admission to be taken is surgery later on today  I ordered Rocephin and Flagyl IV  Ordered clotting times for surgery  All imaging and/or lab testing discussed with patient  Patient and/or family members verbalizes understanding and agrees with plan for admission  Patient is stable for admission      Portions of the record may have been created with voice recognition software  Occasional wrong word or "sound a like" substitutions may have occurred due to the inherent limitations of voice recognition software  Read the chart carefully and recognize, using context, where substitutions have occurred  SBIRT 20yo+      Most Recent Value   SBIRT (24 yo +)   In order to provide better care to our patients, we are screening all of our patients for alcohol and drug use  Would it be okay to ask you these screening questions? Yes Filed at: 03/12/2021 0305   Initial Alcohol Screen: US AUDIT-C    1  How often do you have a drink containing alcohol? 1 Filed at: 03/12/2021 0305   2  How many drinks containing alcohol do you have on a typical day you are drinking? 1 Filed at: 03/12/2021 0305   3a  Male UNDER 65: How often do you have five or more drinks on one occasion? 0 Filed at: 03/12/2021 0305   3b  FEMALE Any Age, or MALE 65+: How often do you have 4 or more drinks on one occassion? 0 Filed at: 03/12/2021 0305   Audit-C Score  2 Filed at: 03/12/2021 9991   MARICARMEN: How many times in the past year have you    Used an illegal drug or used a prescription medication for non-medical reasons? Never Filed at: 03/12/2021 0305                    MDM  Number of Diagnoses or Management Options  Acute appendicitis:   Diagnosis management comments: Choleycystitis, pancreatitis, appendicitis, IBS, SBO, constipation  diverticulitis       Amount and/or Complexity of Data Reviewed  Clinical lab tests: ordered and reviewed  Tests in the radiology section of CPT®: ordered and reviewed  Obtain history from someone other than the patient: yes (wife)  Discuss the patient with other providers: yes (Dr Werner Lugo via TT)  Independent visualization of images, tracings, or specimens: yes    Risk of Complications, Morbidity, and/or Mortality  Presenting problems: high  Diagnostic procedures: moderate  Management options: moderate    Patient Progress  Patient progress: stable      Disposition  Final diagnoses:   Acute appendicitis     Time reflects when diagnosis was documented in both MDM as applicable and the Disposition within this note     Time User Action Codes Description Comment    3/12/2021  4:44 AM Padmini Drew Add [K35 80] Acute appendicitis       ED Disposition     ED Disposition Condition Date/Time Comment    Admit Stable Fri Mar 12, 2021  4:44 AM Case was discussed with Dr Werner Lugo and the patient's admission status was agreed to be Admission Status: inpatient status to the service of Dr Werner Lugo   Follow-up Information    None         Patient's Medications   Discharge Prescriptions    No medications on file     No discharge procedures on file      PDMP Review     None          ED Provider  Electronically Signed by           Renetta Fischer MD  03/12/21 0364

## 2021-03-13 NOTE — NURSING NOTE
Discharge instructions reviewed with patient and his SO  All questions answered and both voiced understanding  Patient left via R [address] escorted by PCA  Janessa Canales Patient left in stable condition, understanding post surgical precautions and instructions

## 2021-03-15 ENCOUNTER — TRANSITIONAL CARE MANAGEMENT (OUTPATIENT)
Dept: FAMILY MEDICINE CLINIC | Facility: CLINIC | Age: 26
End: 2021-03-15

## 2021-03-15 NOTE — UTILIZATION REVIEW
Notification of Discharge  This is a Notification of Discharge from our facility [address]  Please be advised that this patient has been discharge from our facility  Below you will find the admission and discharge date and time including the patients disposition  PRESENTATION DATE: 3/12/2021  3:02 AM  OBS ADMISSION DATE:   IP ADMISSION DATE: 3/12/21 0444   DISCHARGE DATE: 3/12/2021  7:29 PM  DISPOSITION: Home/Self Care Home/Self Care   Admission Orders listed below:  Admission Orders (From admission, onward)     Ordered        03/12/21 0444  Inpatient Admission  Once                   Please contact the UR Department if additional information is required to close this patient's authorization/case  [address] [facility] Utilization Review Department  Main: [phone] x carefully listen to the prompts  All voicemails are confidential   [email or web address]  Send all requests for admission clinical reviews, approved or denied determinations and any other requests to dedicated fax number below belonging to the campus where the patient is receiving treatment   List of dedicated fax numbers:  [address] DENIALS (Administrative/Medical Necessity) [phone]   [address] (Maternity/NICU/Pediatrics) [phone]   Angeles Salas [phone]   [facility] [phone]   Sabino Valera [phone]   [address] [phone]   [facility]  [phone]   [address] [facility] [address]  [address] [facility] [address] [facility] [phone]

## 2021-03-16 ENCOUNTER — TELEPHONE (OUTPATIENT)
Dept: SURGERY | Facility: CLINIC | Age: 26
End: 2021-03-16

## 2021-03-16 NOTE — TELEPHONE ENCOUNTER
Patients short term disability form and return to work note was faxed to [employer]  Patient is aware and a copy will be provided to the patient to obtain for his records

## 2021-03-18 ENCOUNTER — TELEPHONE (OUTPATIENT)
Dept: SURGERY | Facility: CLINIC | Age: 26
End: 2021-03-18

## 2021-03-26 ENCOUNTER — OFFICE VISIT (OUTPATIENT)
Dept: SURGERY | Facility: CLINIC | Age: 26
End: 2021-03-26

## 2021-03-26 VITALS — TEMPERATURE: 97.8 F

## 2021-03-26 DIAGNOSIS — K35.80 ACUTE APPENDICITIS: Primary | ICD-10-CM

## 2021-03-26 PROCEDURE — 99024 POSTOP FOLLOW-UP VISIT: CPT | Performed by: SURGERY

## 2021-03-26 NOTE — LETTER
March 26, 2021     Omayra Agarwal DO  [address]    Patient: Renée Richey  YOB: 1995   Date of Visit: 3/26/2021       Dear Dr Padmaja Bazzi: Thank you for referring America Carrero to me for evaluation  Below are my notes for this consultation  If you have questions, please do not hesitate to call me  I look forward to following your patient along with you  Sincerely,        Isabel Ulloa MD        CC: No Recipients  Isabel Ulloa MD  3/26/2021 12:03 PM  Incomplete  Assessment/Plan:    Acute appendicitis    Patient returns to the office for routine follow-up status post laparoscopic appendectomy for the definitive treatment of his acute appendicitis  The patient has had an uneventful postoperative convalescence  Here in the office he is well appearing  Awake alert oriented  Pleasant competent reliable as a historian  His abdomen is soft  The surgical wounds clean dry and intact  He is nontender to palpation  The operative note pathology report were reviewed with the patient  All questions answered to his satisfaction  He has been cleared to return to work without restriction after 29th March 2021  All questions answered to the satisfaction of the patient  He has been encouraged to follow up with the practice at any point future with questions or concerns  Subjective:      Patient ID: Renée Richey  is a 30 y o  male  Patient is her today s/p po lap appy 03-  Stated that he is doing well and incisions sites are clean and dry  No fever or chills  Tsering Driscoll MA          Review of Systems   Constitutional: Negative for chills and fever  HENT: Negative for ear pain and sore throat  Eyes: Negative for pain and visual disturbance  Respiratory: Negative for cough and shortness of breath  Cardiovascular: Negative for chest pain and palpitations     Gastrointestinal: Negative for abdominal pain and vomiting  Genitourinary: Negative for dysuria and hematuria  Musculoskeletal: Negative for arthralgias and back pain  Skin: Negative for color change and rash  Neurological: Negative for seizures and syncope  All other systems reviewed and are negative  Objective:      Temp 97 8 °F (36 6 °C) (Tympanic)          Physical Exam  Vitals signs and nursing note reviewed  Constitutional:       Appearance: He is well-developed  HENT:      Head: Normocephalic and atraumatic  Eyes:      Conjunctiva/sclera: Conjunctivae normal       Pupils: Pupils are equal, round, and reactive to light  Neck:      Musculoskeletal: Normal range of motion and neck supple  Cardiovascular:      Rate and Rhythm: Normal rate and regular rhythm  Pulmonary:      Effort: Pulmonary effort is normal       Breath sounds: Normal breath sounds  Abdominal:      General: Bowel sounds are normal       Palpations: Abdomen is soft  Comments: Surgical wounds clean dry intact   Musculoskeletal: Normal range of motion  Skin:     General: Skin is warm and dry  Neurological:      Mental Status: He is alert and oriented to person, place, and time  Psychiatric:         Behavior: Behavior normal          Thought Content:  Thought content normal          Judgment: Judgment normal

## 2021-03-26 NOTE — PROGRESS NOTES
Assessment/Plan:    Acute appendicitis    Patient returns to the office for routine follow-up status post laparoscopic appendectomy for the definitive treatment of his acute appendicitis  The patient has had an uneventful postoperative convalescence  Here in the office he is well appearing  Awake alert oriented  Pleasant competent reliable as a historian  His abdomen is soft  The surgical wounds clean dry and intact  He is nontender to palpation  The operative note pathology report were reviewed with the patient  All questions answered to his satisfaction  He has been cleared to return to work without restriction after 29th March 2021  All questions answered to the satisfaction of the patient  He has been encouraged to follow up with the practice at any point future with questions or concerns  Subjective:      Patient ID: Yolis Kaye  is a 30 y o  male  Patient is her today s/p po lap appy 03-  Stated that he is doing well and incisions sites are clean and dry  No fever or chills  Tsering Driscoll MA          Review of Systems   Constitutional: Negative for chills and fever  HENT: Negative for ear pain and sore throat  Eyes: Negative for pain and visual disturbance  Respiratory: Negative for cough and shortness of breath  Cardiovascular: Negative for chest pain and palpitations  Gastrointestinal: Negative for abdominal pain and vomiting  Genitourinary: Negative for dysuria and hematuria  Musculoskeletal: Negative for arthralgias and back pain  Skin: Negative for color change and rash  Neurological: Negative for seizures and syncope  All other systems reviewed and are negative  Objective:      Temp 97 8 °F (36 6 °C) (Tympanic)          Physical Exam  Vitals signs and nursing note reviewed  Constitutional:       Appearance: He is well-developed  HENT:      Head: Normocephalic and atraumatic     Eyes:      Conjunctiva/sclera: Conjunctivae normal       Pupils: Pupils are equal, round, and reactive to light  Neck:      Musculoskeletal: Normal range of motion and neck supple  Cardiovascular:      Rate and Rhythm: Normal rate and regular rhythm  Pulmonary:      Effort: Pulmonary effort is normal       Breath sounds: Normal breath sounds  Abdominal:      General: Bowel sounds are normal       Palpations: Abdomen is soft  Comments: Surgical wounds clean dry intact   Musculoskeletal: Normal range of motion  Skin:     General: Skin is warm and dry  Neurological:      Mental Status: He is alert and oriented to person, place, and time  Psychiatric:         Behavior: Behavior normal          Thought Content:  Thought content normal          Judgment: Judgment normal

## 2021-03-26 NOTE — ASSESSMENT & PLAN NOTE
Patient returns to the office for routine follow-up status post laparoscopic appendectomy for the definitive treatment of his acute appendicitis  The patient has had an uneventful postoperative convalescence  Here in the office he is well appearing  Awake alert oriented  Pleasant competent reliable as a historian  His abdomen is soft  The surgical wounds clean dry and intact  He is nontender to palpation  The operative note pathology report were reviewed with the patient  All questions answered to his satisfaction  He has been cleared to return to work without restriction after 29th March 2021  All questions answered to the satisfaction of the patient  He has been encouraged to follow up with the practice at any point future with questions or concerns

## 2021-05-03 DIAGNOSIS — F41.1 GENERALIZED ANXIETY DISORDER: ICD-10-CM

## 2021-05-03 RX ORDER — SERTRALINE HYDROCHLORIDE 25 MG/1
25 TABLET, FILM COATED ORAL DAILY
Qty: 30 TABLET | Refills: 2 | Status: SHIPPED | OUTPATIENT
Start: 2021-05-03 | End: 2021-06-03 | Stop reason: SDUPTHER

## 2021-05-06 ENCOUNTER — OFFICE VISIT (OUTPATIENT)
Dept: FAMILY MEDICINE CLINIC | Facility: CLINIC | Age: 26
End: 2021-05-06
Payer: COMMERCIAL

## 2021-05-06 VITALS
DIASTOLIC BLOOD PRESSURE: 76 MMHG | TEMPERATURE: 98.4 F | HEIGHT: 68 IN | WEIGHT: 224.4 LBS | SYSTOLIC BLOOD PRESSURE: 122 MMHG | BODY MASS INDEX: 34.01 KG/M2 | OXYGEN SATURATION: 95 % | HEART RATE: 86 BPM

## 2021-05-06 DIAGNOSIS — Z00.00 ANNUAL PHYSICAL EXAM: Primary | ICD-10-CM

## 2021-05-06 PROCEDURE — 3008F BODY MASS INDEX DOCD: CPT | Performed by: PHYSICIAN ASSISTANT

## 2021-05-06 PROCEDURE — 3725F SCREEN DEPRESSION PERFORMED: CPT | Performed by: PHYSICIAN ASSISTANT

## 2021-05-06 PROCEDURE — 99395 PREV VISIT EST AGE 18-39: CPT | Performed by: PHYSICIAN ASSISTANT

## 2021-05-06 PROCEDURE — 4004F PT TOBACCO SCREEN RCVD TLK: CPT | Performed by: PHYSICIAN ASSISTANT

## 2021-05-06 NOTE — PROGRESS NOTES
[facility]    NAME: Jovanny Anderson  AGE: 30 y o  SEX: male  : 1995     DATE: 2021     Assessment and Plan:     Problem List Items Addressed This Visit     None      Visit Diagnoses     Annual physical exam    -  Primary    BMI 34 0-34 9,adult              Immunizations and preventive care screenings were discussed with patient today  Appropriate education was printed on patient's after visit summary  Counseling:  Dental Health: discussed importance of regular tooth brushing, flossing, and dental visits  Injury prevention: discussed safety/seat belts, safety helmets, smoke detectors, carbon dioxide detectors, and smoking near bedding or upholstery  · Exercise: the importance of regular exercise/physical activity was discussed  Recommend exercise 3-5 times per week for at least 30 minutes  BMI Counseling: Body mass index is 34 12 kg/m²  The BMI is above normal  Nutrition recommendations include encouraging healthy choices of fruits and vegetables and consuming healthier snacks  Exercise recommendations include exercising 3-5 times per week  Zoloft sent to pharmacy on 5/3/21  Continue same dose  He will schedule eye exam and dental cleaning  Return in 1 year (on 2022)  Chief Complaint:     Chief Complaint   Patient presents with    Annual Exam     Well visit and medication refill       History of Present Illness:     Adult Annual Physical   Patient here for a comprehensive physical exam  The patient reports no problems  He is asking for a refill on his sertraline  He is doing well on the medication  He denies any problems  He is due for an eye exam and a dental cleaning  He does brush his teeth 3x daily  He denies any vision problems  He denies any other concerns at this time  Diet and Physical Activity  · Diet/Nutrition: limited junk food  · Exercise: no formal exercise        Depression Screening  PHQ-9 Depression Screening    PHQ-9:   Frequency of the following problems over the past two weeks:      Little interest or pleasure in doing things: 0 - not at all  Feeling down, depressed, or hopeless: 0 - not at all  PHQ-2 Score: 0       General Health  · Sleep: sleeps well  · Hearing: normal - bilateral   · Vision: no vision problems and most recent eye exam >1 year ago  · Dental: brushes teeth three times daily  Review of Systems:     Review of Systems   Constitutional: Negative for chills, diaphoresis, fatigue and fever  HENT: Negative for congestion, ear pain, postnasal drip, rhinorrhea, sneezing, sore throat and trouble swallowing  Eyes: Negative for pain and visual disturbance  Respiratory: Negative for apnea, cough, shortness of breath and wheezing  Cardiovascular: Negative for chest pain and palpitations  Gastrointestinal: Negative for abdominal pain, constipation, diarrhea, nausea and vomiting  Genitourinary: Negative for dysuria  Musculoskeletal: Negative for arthralgias, gait problem and myalgias  Neurological: Negative for dizziness, syncope, weakness, light-headedness, numbness and headaches  Psychiatric/Behavioral: Negative for suicidal ideas  The patient is not nervous/anxious         Past Medical History:     Past Medical History:   Diagnosis Date    Acute appendicitis 3/12/2021    Anxiety attack     last assessed 02/18/15    Back complaints     Palpitations     last assessed 03/19/15    Psychiatric disorder     Reactive airway disease     last assessed 02/18/15    Syncope     last assessed 09/29/16    Tachyarrhythmia     last assessed 03/19/15      Past Surgical History:     Past Surgical History:   Procedure Laterality Date    FRACTURE SURGERY      HIP SURGERY Right     AL LAP,APPENDECTOMY N/A 3/12/2021    Procedure: APPENDECTOMY LAPAROSCOPIC;  Surgeon: Kevin Noyola MD;  Location: [facility];  Service: General      Social History: E-Cigarette/Vaping    E-Cigarette Use Never User      E-Cigarette/Vaping Substances    Nicotine No     THC No     CBD No     Flavoring No     Other No     Unknown No      Social History     Socioeconomic History    Marital status: Single     Spouse name: None    Number of children: None    Years of education: None    Highest education level: None   Occupational History    None   Social Needs    Financial resource strain: None    Food insecurity     Worry: None     Inability: None    Transportation needs     Medical: None     Non-medical: None   Tobacco Use    Smoking status: Current Some Day Smoker     Packs/day: 0 25     Years: 15 00     Pack years: 3 75     Types: Cigarettes    Smokeless tobacco: Current User     Types: Chew   Substance and Sexual Activity    Alcohol use: Yes     Frequency: 2-3 times a week     Drinks per session: 1 or 2     Binge frequency: Less than monthly     Comment: WEEKENDS- social    Drug use: Never    Sexual activity: Yes   Lifestyle    Physical activity     Days per week: None     Minutes per session: None    Stress: None   Relationships    Social connections     Talks on phone: None     Gets together: None     Attends Mosque service: None     Active member of club or organization: None     Attends meetings of clubs or organizations: None     Relationship status: None    Intimate partner violence     Fear of current or ex partner: None     Emotionally abused: None     Physically abused: None     Forced sexual activity: None   Other Topics Concern    None   Social History Narrative    No living will      Family History:     Family History   Problem Relation Age of Onset    No Known Problems Mother     No Known Problems Father       Current Medications:     Current Outpatient Medications   Medication Sig Dispense Refill    fexofenadine (ALLEGRA) 180 MG tablet Take 180 mg by mouth daily      naproxen (NAPROSYN) 500 mg tablet Take 1 tablet (500 mg total) by mouth 2 (two) times a day with meals 60 tablet 0    sertraline (ZOLOFT) 25 mg tablet Take 1 tablet (25 mg total) by mouth daily 30 tablet 2     No current facility-administered medications for this visit  Allergies:     No Known Allergies   Physical Exam:     /76   Pulse 86   Temp 98 4 °F (36 9 °C)   Ht 5' 8" (1 727 m)   Wt 102 kg (224 lb 6 4 oz)   SpO2 95%   BMI 34 12 kg/m²     Physical Exam  Vitals signs and nursing note reviewed  Constitutional:       Appearance: He is well-developed  HENT:      Head: Normocephalic and atraumatic  Right Ear: Tympanic membrane, ear canal and external ear normal  There is no impacted cerumen  Left Ear: Tympanic membrane, ear canal and external ear normal  There is no impacted cerumen  Nose: Nose normal  No congestion or rhinorrhea  Mouth/Throat:      Mouth: Mucous membranes are moist       Pharynx: No oropharyngeal exudate or posterior oropharyngeal erythema  Eyes:      Extraocular Movements: Extraocular movements intact  Conjunctiva/sclera: Conjunctivae normal    Neck:      Musculoskeletal: Normal range of motion and neck supple  Cardiovascular:      Rate and Rhythm: Normal rate and regular rhythm  Heart sounds: Normal heart sounds  No murmur  No friction rub  No gallop  Pulmonary:      Effort: Pulmonary effort is normal  No respiratory distress  Breath sounds: Normal breath sounds  No wheezing, rhonchi or rales  Abdominal:      Palpations: Abdomen is soft  Tenderness: There is no abdominal tenderness  There is no guarding or rebound  Musculoskeletal: Normal range of motion  Skin:     General: Skin is warm and dry  Neurological:      Mental Status: He is alert and oriented to person, place, and time  Gait: Gait normal    Psychiatric:         Mood and Affect: Mood normal          Behavior: Behavior normal          Thought Content:  Thought content normal          Judgment: Judgment normal  Yuval Riley PA-C   [id number] [facility]

## 2021-05-06 NOTE — PATIENT INSTRUCTIONS

## 2021-06-03 DIAGNOSIS — F41.1 GENERALIZED ANXIETY DISORDER: ICD-10-CM

## 2021-06-03 RX ORDER — SERTRALINE HYDROCHLORIDE 25 MG/1
25 TABLET, FILM COATED ORAL DAILY
Qty: 30 TABLET | Refills: 2 | Status: SHIPPED | OUTPATIENT
Start: 2021-06-03 | End: 2021-08-02 | Stop reason: SDUPTHER

## 2021-08-02 DIAGNOSIS — F41.1 GENERALIZED ANXIETY DISORDER: ICD-10-CM

## 2021-08-02 RX ORDER — SERTRALINE HYDROCHLORIDE 25 MG/1
25 TABLET, FILM COATED ORAL DAILY
Qty: 30 TABLET | Refills: 2 | Status: SHIPPED | OUTPATIENT
Start: 2021-08-02 | End: 2021-12-20 | Stop reason: SDUPTHER

## 2021-08-05 ENCOUNTER — APPOINTMENT (OUTPATIENT)
Dept: PHYSICAL THERAPY | Facility: CLINIC | Age: 26
End: 2021-08-05

## 2021-08-05 PROCEDURE — 97530 THERAPEUTIC ACTIVITIES: CPT | Performed by: PHYSICAL THERAPIST

## 2021-12-20 DIAGNOSIS — F41.1 GENERALIZED ANXIETY DISORDER: ICD-10-CM

## 2021-12-20 RX ORDER — SERTRALINE HYDROCHLORIDE 25 MG/1
25 TABLET, FILM COATED ORAL DAILY
Qty: 30 TABLET | Refills: 2 | Status: SHIPPED | OUTPATIENT
Start: 2021-12-20 | End: 2022-02-18 | Stop reason: SDUPTHER

## 2022-02-18 DIAGNOSIS — F41.1 GENERALIZED ANXIETY DISORDER: ICD-10-CM

## 2022-02-18 RX ORDER — SERTRALINE HYDROCHLORIDE 25 MG/1
25 TABLET, FILM COATED ORAL DAILY
Qty: 30 TABLET | Refills: 2 | Status: SHIPPED | OUTPATIENT
Start: 2022-02-18 | End: 2022-03-28 | Stop reason: SDUPTHER

## 2022-03-28 DIAGNOSIS — F41.1 GENERALIZED ANXIETY DISORDER: ICD-10-CM

## 2022-03-28 RX ORDER — SERTRALINE HYDROCHLORIDE 25 MG/1
25 TABLET, FILM COATED ORAL DAILY
Qty: 30 TABLET | Refills: 2 | Status: SHIPPED | OUTPATIENT
Start: 2022-03-28

## 2022-04-06 ENCOUNTER — OFFICE VISIT (OUTPATIENT)
Dept: FAMILY MEDICINE CLINIC | Facility: CLINIC | Age: 27
End: 2022-04-06
Payer: COMMERCIAL

## 2022-04-06 VITALS
SYSTOLIC BLOOD PRESSURE: 120 MMHG | HEART RATE: 72 BPM | WEIGHT: 204.6 LBS | HEIGHT: 68 IN | DIASTOLIC BLOOD PRESSURE: 68 MMHG | OXYGEN SATURATION: 98 % | TEMPERATURE: 97.2 F | BODY MASS INDEX: 31.01 KG/M2

## 2022-04-06 DIAGNOSIS — M50.20 CERVICAL DISC HERNIATION: ICD-10-CM

## 2022-04-06 DIAGNOSIS — M54.12 CERVICAL RADICULOPATHY: Primary | ICD-10-CM

## 2022-04-06 PROCEDURE — 99213 OFFICE O/P EST LOW 20 MIN: CPT | Performed by: PHYSICIAN ASSISTANT

## 2022-04-06 PROCEDURE — 3725F SCREEN DEPRESSION PERFORMED: CPT | Performed by: PHYSICIAN ASSISTANT

## 2022-04-06 PROCEDURE — 1036F TOBACCO NON-USER: CPT | Performed by: PHYSICIAN ASSISTANT

## 2022-04-06 PROCEDURE — 3008F BODY MASS INDEX DOCD: CPT | Performed by: PHYSICIAN ASSISTANT

## 2022-04-06 NOTE — PROGRESS NOTES
Assessment/Plan:    Problem List Items Addressed This Visit        Nervous and Auditory    Cervical radiculopathy - Primary    Relevant Orders    Ambulatory Referral to Physical Therapy       Musculoskeletal and Integument    Cervical disc herniation    Relevant Orders    Ambulatory Referral to Physical Therapy           Diagnoses and all orders for this visit:    Cervical radiculopathy  -     Ambulatory Referral to Physical Therapy; Future    Cervical disc herniation  -     Ambulatory Referral to Physical Therapy; Future        Referral placed to physical therapy  Recommended that he continue rest, heat, stretching and ibuprofen    Subjective:      Patient ID: Werner Moreno  is a 27 y o  male  Tian Aguila is a pleasant 27year old male who is here today complaining of neck pain  This is a flare up of a chronic issue  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  He denies any recent injuries  He tried ibuprofen and heat this morning, which helped  He did see a neurosurgeon in January of 2020  He was referred to PT, which was helping  However, he was never able to finish the entire course due to [place]  He would like to restart therapy  He denies any headaches, upper extremity weakness,       The following portions of the patient's history were reviewed and updated as appropriate:   He has a past medical history of Acute appendicitis (3/12/2021), Anxiety attack, Back complaints, Palpitations, Psychiatric disorder, Reactive airway disease, Syncope, and Tachyarrhythmia ,  does not have any pertinent problems on file  ,   has a past surgical history that includes Hip surgery (Right); Fracture surgery; and pr lap,appendectomy (N/A, 3/12/2021)  ,  family history includes No Known Problems in his father and mother  ,   reports that he has been smoking cigarettes  He has a 3 75 pack-year smoking history  His smokeless tobacco use includes chew  He reports current alcohol use  He reports that he does not use drugs  ,  has No Known Allergies     Current Outpatient Medications   Medication Sig Dispense Refill    sertraline (ZOLOFT) 25 mg tablet Take 1 tablet (25 mg total) by mouth daily 30 tablet 2    fexofenadine (ALLEGRA) 180 MG tablet Take 180 mg by mouth daily (Patient not taking: Reported on 4/6/2022 )      naproxen (NAPROSYN) 500 mg tablet Take 1 tablet (500 mg total) by mouth 2 (two) times a day with meals (Patient not taking: Reported on 4/6/2022 ) 60 tablet 0     No current facility-administered medications for this visit  Review of Systems   Constitutional: Negative for chills, diaphoresis, fatigue and fever  HENT: Negative for congestion, ear pain, postnasal drip, rhinorrhea, sneezing, sore throat and trouble swallowing  Eyes: Negative for pain and visual disturbance  Respiratory: Negative for apnea, cough, shortness of breath and wheezing  Cardiovascular: Negative for chest pain and palpitations  Gastrointestinal: Negative for abdominal pain, constipation, diarrhea, nausea and vomiting  Genitourinary: Negative for dysuria  Musculoskeletal: Positive for arthralgias (right sided neck pain)  Negative for gait problem and myalgias  Neurological: Negative for dizziness, syncope, weakness, light-headedness, numbness and headaches  Psychiatric/Behavioral: Negative for suicidal ideas  The patient is not nervous/anxious  Objective:  Vitals:    04/06/22 1228   BP: 120/68   Pulse: 72   Temp: (!) 97 2 °F (36 2 °C)   SpO2: 98%   Weight: 92 8 kg (204 lb 9 6 oz)   Height: 5' 8" (1 727 m)     Body mass index is 31 11 kg/m²  Physical Exam  Vitals and nursing note reviewed  Constitutional:       Appearance: He is well-developed  HENT:      Head: Normocephalic and atraumatic  Right Ear: External ear normal       Left Ear: External ear normal       Nose: Nose normal       Mouth/Throat:      Pharynx: No oropharyngeal exudate or posterior oropharyngeal erythema     Eyes:      Extraocular Movements: Extraocular movements intact  Cardiovascular:      Rate and Rhythm: Normal rate and regular rhythm  Heart sounds: Normal heart sounds  No murmur heard  No friction rub  No gallop  Pulmonary:      Effort: Pulmonary effort is normal  No respiratory distress  Breath sounds: Normal breath sounds  No wheezing or rales  Musculoskeletal:      Cervical back: Neck supple  Spasms and tenderness (right sided parapsinal muscles) present  No deformity  Decreased range of motion  Comments: Full strength of bilateral upper extremities   Lymphadenopathy:      Cervical: No cervical adenopathy  Skin:     General: Skin is warm and dry  Neurological:      Mental Status: He is alert and oriented to person, place, and time  Psychiatric:         Behavior: Behavior normal          Thought Content:  Thought content normal          Judgment: Judgment normal

## 2022-04-06 NOTE — LETTER
April 6, 2022     Patient: Gallo Juarez  YOB: 1995   Date of Visit: 4/6/2022       To Whom it May Concern:    Lisa Yan is under my professional care  He was seen in my office on 4/6/2022  He may return to work on 4/7/2022  If you have any questions or concerns, please don't hesitate to call           Sincerely,          Emery Manjarrez PA-C

## 2022-04-07 ENCOUNTER — TELEPHONE (OUTPATIENT)
Dept: FAMILY MEDICINE CLINIC | Facility: CLINIC | Age: 27
End: 2022-04-07

## 2022-04-07 NOTE — TELEPHONE ENCOUNTER
Pt calling stating his return to work note needs to state "zero limitiations"  Can you please write a new note for him   Thank you

## 2022-04-13 ENCOUNTER — HOSPITAL ENCOUNTER (EMERGENCY)
Facility: HOSPITAL | Age: 27
Discharge: HOME | End: 2022-04-13
Attending: EMERGENCY MEDICINE | Admitting: EMERGENCY MEDICINE
Payer: COMMERCIAL

## 2022-04-13 ENCOUNTER — APPOINTMENT (EMERGENCY)
Dept: CT IMAGING | Facility: HOSPITAL | Age: 27
End: 2022-04-13
Payer: COMMERCIAL

## 2022-04-13 VITALS
SYSTOLIC BLOOD PRESSURE: 113 MMHG | HEART RATE: 61 BPM | OXYGEN SATURATION: 97 % | TEMPERATURE: 97 F | DIASTOLIC BLOOD PRESSURE: 77 MMHG | RESPIRATION RATE: 20 BRPM

## 2022-04-13 DIAGNOSIS — R10.13 EPIGASTRIC ABDOMINAL PAIN: Primary | ICD-10-CM

## 2022-04-13 LAB
ALBUMIN SERPL BCP-MCNC: 3.9 G/DL (ref 3.5–5)
ALP SERPL-CCNC: 71 U/L (ref 46–116)
ALT SERPL W P-5'-P-CCNC: 40 U/L (ref 12–78)
ANION GAP SERPL CALCULATED.3IONS-SCNC: 8 MMOL/L (ref 4–13)
AST SERPL W P-5'-P-CCNC: 20 U/L (ref 5–45)
BASOPHILS # BLD AUTO: 0.03 THOUSANDS/ΜL (ref 0–0.1)
BASOPHILS NFR BLD AUTO: 1 % (ref 0–1)
BILIRUB SERPL-MCNC: 0.36 MG/DL (ref 0.2–1)
BUN SERPL-MCNC: 9 MG/DL (ref 5–25)
CALCIUM SERPL-MCNC: 9.5 MG/DL (ref 8.3–10.1)
CHLORIDE SERPL-SCNC: 105 MMOL/L (ref 100–108)
CO2 SERPL-SCNC: 28 MMOL/L (ref 21–32)
CREAT SERPL-MCNC: 1.08 MG/DL (ref 0.6–1.3)
EOSINOPHIL # BLD AUTO: 0.14 THOUSAND/ΜL (ref 0–0.61)
EOSINOPHIL NFR BLD AUTO: 3 % (ref 0–6)
ERYTHROCYTE [DISTWIDTH] IN BLOOD BY AUTOMATED COUNT: 11.8 % (ref 11.6–15.1)
GFR SERPL CREATININE-BSD FRML MDRD: 92 ML/MIN/1.73SQ M
GLUCOSE SERPL-MCNC: 89 MG/DL (ref 65–140)
HCT VFR BLD AUTO: 41.3 % (ref 36.5–49.3)
HGB BLD-MCNC: 14.7 G/DL (ref 12–17)
IMM GRANULOCYTES # BLD AUTO: 0.01 THOUSAND/UL (ref 0–0.2)
IMM GRANULOCYTES NFR BLD AUTO: 0 % (ref 0–2)
LIPASE SERPL-CCNC: 81 U/L (ref 73–393)
LYMPHOCYTES # BLD AUTO: 2.09 THOUSANDS/ΜL (ref 0.6–4.47)
LYMPHOCYTES NFR BLD AUTO: 37 % (ref 14–44)
MAGNESIUM SERPL-MCNC: 2.1 MG/DL (ref 1.6–2.6)
MCH RBC QN AUTO: 30.9 PG (ref 26.8–34.3)
MCHC RBC AUTO-ENTMCNC: 35.6 G/DL (ref 31.4–37.4)
MCV RBC AUTO: 87 FL (ref 82–98)
MONOCYTES # BLD AUTO: 0.48 THOUSAND/ΜL (ref 0.17–1.22)
MONOCYTES NFR BLD AUTO: 9 % (ref 4–12)
NEUTROPHILS # BLD AUTO: 2.93 THOUSANDS/ΜL (ref 1.85–7.62)
NEUTS SEG NFR BLD AUTO: 50 % (ref 43–75)
NRBC BLD AUTO-RTO: 0 /100 WBCS
PLATELET # BLD AUTO: 252 THOUSANDS/UL (ref 149–390)
PMV BLD AUTO: 9.3 FL (ref 8.9–12.7)
POTASSIUM SERPL-SCNC: 4.2 MMOL/L (ref 3.5–5.3)
PROT SERPL-MCNC: 7.6 G/DL (ref 6.4–8.2)
RBC # BLD AUTO: 4.75 MILLION/UL (ref 3.88–5.62)
SODIUM SERPL-SCNC: 141 MMOL/L (ref 136–145)
WBC # BLD AUTO: 5.68 THOUSAND/UL (ref 4.31–10.16)

## 2022-04-13 PROCEDURE — 99284 EMERGENCY DEPT VISIT MOD MDM: CPT

## 2022-04-13 PROCEDURE — 36415 COLL VENOUS BLD VENIPUNCTURE: CPT | Performed by: EMERGENCY MEDICINE

## 2022-04-13 PROCEDURE — 99284 EMERGENCY DEPT VISIT MOD MDM: CPT | Performed by: EMERGENCY MEDICINE

## 2022-04-13 PROCEDURE — 96375 TX/PRO/DX INJ NEW DRUG ADDON: CPT

## 2022-04-13 PROCEDURE — 83690 ASSAY OF LIPASE: CPT | Performed by: EMERGENCY MEDICINE

## 2022-04-13 PROCEDURE — 96374 THER/PROPH/DIAG INJ IV PUSH: CPT

## 2022-04-13 PROCEDURE — 74177 CT ABD & PELVIS W/CONTRAST: CPT

## 2022-04-13 PROCEDURE — 85025 COMPLETE CBC W/AUTO DIFF WBC: CPT | Performed by: EMERGENCY MEDICINE

## 2022-04-13 PROCEDURE — 83735 ASSAY OF MAGNESIUM: CPT | Performed by: EMERGENCY MEDICINE

## 2022-04-13 PROCEDURE — G1004 CDSM NDSC: HCPCS

## 2022-04-13 PROCEDURE — 80053 COMPREHEN METABOLIC PANEL: CPT | Performed by: EMERGENCY MEDICINE

## 2022-04-13 RX ORDER — PANTOPRAZOLE SODIUM 20 MG/1
20 TABLET, DELAYED RELEASE ORAL DAILY
Qty: 28 TABLET | Refills: 0 | Status: SHIPPED | OUTPATIENT
Start: 2022-04-13 | End: 2022-07-25 | Stop reason: ALTCHOICE

## 2022-04-13 RX ORDER — KETOROLAC TROMETHAMINE 30 MG/ML
10 INJECTION, SOLUTION INTRAMUSCULAR; INTRAVENOUS ONCE
Status: COMPLETED | OUTPATIENT
Start: 2022-04-13 | End: 2022-04-13

## 2022-04-13 RX ADMIN — FAMOTIDINE 20 MG: 10 INJECTION INTRAVENOUS at 08:49

## 2022-04-13 RX ADMIN — IOHEXOL 100 ML: 350 INJECTION, SOLUTION INTRAVENOUS at 09:58

## 2022-04-13 RX ADMIN — KETOROLAC TROMETHAMINE 9.9 MG: 30 INJECTION, SOLUTION INTRAMUSCULAR at 08:53

## 2022-04-13 NOTE — ED PROVIDER NOTES
History  Chief Complaint   Patient presents with    Epigastric Pain     Patient c/o intermittent epigastric pain starting yesterday  Denies chest pain at this time  History provided by:  Medical records and patient  Epigastric Pain  Pain location:  Epigastric  Pain quality: burning    Pain radiates to:  Does not radiate  Pain severity:  Moderate  Onset quality:  Sudden  Duration:  2 days (Intermittent since yesterday with episodes lasting minutes )  Timing:  Intermittent  Progression:  Unchanged  Chronicity:  New (No prior hx of similar sx)  Context: at rest    Relieved by: slightly improved by flexing knees to his chest   Worsened by:  Nothing tried  Ineffective treatments:  None tried  Associated symptoms: abdominal pain    Associated symptoms: no cough, no fever, no heartburn, no lower extremity edema, no nausea, no numbness, no palpitations, no shortness of breath, not vomiting and no weakness    Associated symptoms comment:  No nausea vomiting  States that symptoms were preceded by about two weeks of intermittent diarrhea and gas with occasional abdominal cramping  This is atypical for him as he usually has more regular and consistent bowel movements without either recurrent diarrhea or constipation  No identifiable sick contacts prior to onset of symptoms  No antibiotic use in past 14 days  No associated dysuria/urgency/frequency/hematuria  Does not have any history of GERD  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: surgery (appendectomy March 2021; no other GI/ surgery)    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: no high cholesterol and no hypertension      DDx including but not limited to: gastroenteritis, gastritis, PUD, GERD, hepatitis, pancreatitis, colitis, enteritis, cholecystitis, biliary colic     Cbc/cmp/lipase/ct a/p with IV contrast   Symptomatic treatment while workup ongoing  Prior to Admission Medications   Prescriptions Last Dose Informant Patient Reported?  Taking?   fexofenadine (ALLEGRA) 180 MG tablet  Self Yes No   Sig: Take 180 mg by mouth daily   Patient not taking: Reported on 4/6/2022    naproxen (NAPROSYN) 500 mg tablet   No No   Sig: Take 1 tablet (500 mg total) by mouth 2 (two) times a day with meals   Patient not taking: Reported on 4/6/2022    sertraline (ZOLOFT) 25 mg tablet   No No   Sig: Take 1 tablet (25 mg total) by mouth daily      Facility-Administered Medications: None       Past Medical History:   Diagnosis Date    Acute appendicitis 3/12/2021    Anxiety attack     last assessed 02/18/15    Back complaints     Palpitations     last assessed 03/19/15    Psychiatric disorder     Reactive airway disease     last assessed 02/18/15    Syncope     last assessed 09/29/16    Tachyarrhythmia     last assessed 03/19/15       Past Surgical History:   Procedure Laterality Date    FRACTURE SURGERY      HIP SURGERY Right     TN LAP,APPENDECTOMY N/A 3/12/2021    Procedure: APPENDECTOMY LAPAROSCOPIC;  Surgeon: Eduard Lynch MD;  Location: [facility];  Service: General       Family History   Problem Relation Age of Onset    No Known Problems Mother     No Known Problems Father      I have reviewed and agree with the history as documented  E-Cigarette/Vaping    E-Cigarette Use Never User      E-Cigarette/Vaping Substances    Nicotine No     THC No     CBD No     Flavoring No     Other No     Unknown No      Social History     Tobacco Use    Smoking status: Former Smoker     Packs/day: 0 25     Years: 15 00     Pack years: 3 75     Types: Cigarettes    Smokeless tobacco: Current User     Types: Chew   Vaping Use    Vaping Use: Never used   Substance Use Topics    Alcohol use: Not Currently     Comment: WEEKENDS- social    Drug use: Never       Review of Systems   Constitutional: Negative for chills and fever  Respiratory: Negative for cough and shortness of breath  Cardiovascular: Negative for chest pain and palpitations  Gastrointestinal: Positive for abdominal pain and diarrhea   Negative for heartburn, nausea and vomiting  Genitourinary: Negative for difficulty urinating and flank pain  Neurological: Negative for weakness, light-headedness and numbness  All other systems reviewed and are negative  Physical Exam  Physical Exam  Vitals and nursing note reviewed  Constitutional:       General: He is awake  He is in acute distress (Mild painful distress)  Appearance: Normal appearance  He is well-developed  HENT:      Head: Normocephalic and atraumatic  Right Ear: Hearing and external ear normal       Left Ear: Hearing and external ear normal    Neck:      Trachea: Trachea and phonation normal    Cardiovascular:      Rate and Rhythm: Normal rate and regular rhythm  Pulses:           Radial pulses are 2+ on the right side and 2+ on the left side  Dorsalis pedis pulses are 2+ on the right side and 2+ on the left side  Posterior tibial pulses are 2+ on the right side and 2+ on the left side  Heart sounds: Normal heart sounds, S1 normal and S2 normal  No murmur heard  No friction rub  No gallop  Pulmonary:      Effort: Pulmonary effort is normal  No respiratory distress  Breath sounds: Normal breath sounds  No stridor  No decreased breath sounds, wheezing, rhonchi or rales  Abdominal:      Tenderness: There is abdominal tenderness in the epigastric area  There is no guarding or rebound  Negative signs include Ferrer's sign  Skin:     General: Skin is warm and dry  Neurological:      Mental Status: He is alert and oriented to person, place, and time  GCS: GCS eye subscore is 4  GCS verbal subscore is 5  GCS motor subscore is 6  Cranial Nerves: No cranial nerve deficit  Sensory: No sensory deficit  Motor: No abnormal muscle tone  Comments: PERRLA; EOMI  Sensation intact to light touch over face in V1-V3 distribution bilaterally  Facial expressions symmetric  Tongue/uvula midline  Shoulder shrug equal bilaterally   Strength 5/5 in UE/LE bilaterally  Sensation intact to light touch in UE/LE bilaterally           Vital Signs  ED Triage Vitals [04/13/22 0746]   Temperature Pulse Respirations Blood Pressure SpO2   (!) 97 °F (36 1 °C) 67 18 128/80 97 %      Temp Source Heart Rate Source Patient Position - Orthostatic VS BP Location FiO2 (%)   Temporal Monitor Lying Right arm --      Pain Score       No Pain           Vitals:    04/13/22 1000 04/13/22 1030 04/13/22 1100 04/13/22 1130   BP: 109/74 108/73 113/75 113/77   Pulse: 62 66 61 61   Patient Position - Orthostatic VS: Lying Lying Lying Lying         Visual Acuity      ED Medications  Medications   ketorolac (TORADOL) injection 9 9 mg (9 9 mg Intravenous Given 4/13/22 0853)   famotidine (PEPCID) injection 20 mg (20 mg Intravenous Given 4/13/22 0849)   iohexol (OMNIPAQUE) 350 MG/ML injection (SINGLE-DOSE) 100 mL (100 mL Intravenous Given 4/13/22 0958)       Diagnostic Studies  Results Reviewed     Procedure Component Value Units Date/Time    [facility] [[id number]] Collected: 04/13/22 0848    Lab Status: Final result Specimen: Blood from Arm, Left Updated: 04/13/22 7992     Sodium 141 mmol/L      Potassium 4 2 mmol/L      Chloride 105 mmol/L      CO2 28 mmol/L      ANION GAP 8 mmol/L      BUN 9 mg/dL      Creatinine 1 08 mg/dL      Glucose 89 mg/dL      Calcium 9 5 mg/dL      AST 20 U/L      ALT 40 U/L      Alkaline Phosphatase 71 U/L      Total Protein 7 6 g/dL      Albumin 3 9 g/dL      Total Bilirubin 0 36 mg/dL      eGFR 92 ml/min/1 73sq m     Narrative:      Kerry guidelines for Chronic Kidney Disease (CKD):     Stage 1 with normal or high GFR (GFR > 90 mL/min/1 73 square meters)    Stage 2 Mild CKD (GFR = 60-89 mL/min/1 73 square meters)    Stage 3A Moderate CKD (GFR = 45-59 mL/min/1 73 square meters)    Stage 3B Moderate CKD (GFR = 30-44 mL/min/1 73 square meters)    Stage 4 Severe CKD (GFR = 15-29 mL/min/1 73 square meters)    Stage 5 End Stage CKD (GFR <15 mL/min/1 73 square meters)  Note: GFR calculation is accurate only with a steady state creatinine    Lipase [[id number]]  (Normal) Collected: 04/13/22 0848    Lab Status: Final result Specimen: Blood from Arm, Left Updated: 04/13/22 0922     Lipase 81 u/L     Magnesium [[id number]]  (Normal) Collected: 04/13/22 0848    Lab Status: Final result Specimen: Blood from Arm, Left Updated: 04/13/22 0922     Magnesium 2 1 mg/dL     CBC and differential [[id number]] Collected: 04/13/22 0848    Lab Status: Final result Specimen: Blood from Arm, Left Updated: 04/13/22 0906     WBC 5 68 Thousand/uL      RBC 4 75 Million/uL      Hemoglobin 14 7 g/dL      Hematocrit 41 3 %      MCV 87 fL      MCH 30 9 pg      MCHC 35 6 g/dL      RDW 11 8 %      MPV 9 3 fL      Platelets 731 Thousands/uL      nRBC 0 /100 WBCs      Neutrophils Relative 50 %      Immat GRANS % 0 %      Lymphocytes Relative 37 %      Monocytes Relative 9 %      Eosinophils Relative 3 %      Basophils Relative 1 %      Neutrophils Absolute 2 93 Thousands/µL      Immature Grans Absolute 0 01 Thousand/uL      Lymphocytes Absolute 2 09 Thousands/µL      Monocytes Absolute 0 48 Thousand/µL      Eosinophils Absolute 0 14 Thousand/µL      Basophils Absolute 0 03 Thousands/µL                  CT Abdomen pelvis with contrast   Final Result by Carlos Wang MD (04/13 1053)   No acute inflammatory stranding   No bowel obstruction      1 5 cm rounded hypodensity seen in the anterior aspect of the prostate in image 82 series 2 may be a prostate cyst in the anterior transition zone, of doubtful significance in this young patient  If needed this can be definitely characterized with MRI      The study was marked in EPIC for significant notification        Workstation performed: [id number]                    Procedures  Procedures         ED Course  ED Course as of 04/13/22 1342   Wed Apr 13, 2022   0927 CBC and differential  WBC normal  Hemoglobin/hematocrit normal  Platelets normal   0928 CMP  Electrolytes and transaminases normal   0928 Magnesium  Normal   0928 Lipase  Normal   1004 CT completed and awaiting interpretation   1100 CT Abdomen pelvis with contrast  IMPRESSION:  No acute inflammatory stranding  No bowel obstruction     1 5 cm rounded hypodensity seen in the anterior aspect of the prostate in image 82 series 2 may be a prostate cyst in the anterior transition zone, of doubtful significance in this young patient  If needed this can be definitely characterized with MRI     The study was marked in [facility] for significant notification      Workstation performed: [id number]   3591 Patient re-evaluated  He still has some epigastric discomfort and feels slightly distended  All results CT scan reviewed with him  He has no pelvic pain and no urinary tract symptoms (dysuria, frequency, urgency, difficulty starting urine stream, weak urine stream, difficulty emptying bladder fully) suggesting that the cyst identified on CT scan is not clinically significant  All the symptoms are in the upper abdomen and suggestive of GERD versus gastritis versus peptic ulcer disease  He will need follow-up with primary physician to evaluate his symptoms further  He does note that he chews peppermint tobacco and that this typically triggers GERD symptoms for which he takes Tums  This is usually effective in controlling those symptoms  Did advise that he continue to cut down down tobacco as he has been attempting to do--there may be a connection between chewing tobacco and current abdominal symptoms but this is not defintiive  Further follow-up with primary physician advised to which he was agreeable    Repeat examination:  No significant distention  Mild epigastric tenderness to palpation without guarding/rebound  No palpable masses          MDM    Disposition  Final diagnoses:   Epigastric abdominal pain     Time reflects when diagnosis was documented in both MDM as applicable and the Disposition within this note     Time User Action Codes Description Comment    4/13/2022 11:19 AM Garrettosorio Nielsen Add [R10 13] Epigastric abdominal pain       ED Disposition     ED Disposition Condition Date/Time Comment    Discharge Stable Wed Apr 13, 2022 11:19 AM Renée Richey  discharge to home/self care  Follow-up Information     Follow up With Specialties Details Why Contact Info    Omayra Agarwal DO Family Medicine Call in 1 day For a follow-up appointment [phone] [phone]            Discharge Medication List as of 4/13/2022 11:22 AM      START taking these medications    Details   pantoprazole (PROTONIX) 20 mg tablet Take 1 tablet (20 mg total) by mouth daily, Starting Wed 4/13/2022, Normal         CONTINUE these medications which have NOT CHANGED    Details   fexofenadine (ALLEGRA) 180 MG tablet Take 180 mg by mouth daily, Historical Med      naproxen (NAPROSYN) 500 mg tablet Take 1 tablet (500 mg total) by mouth 2 (two) times a day with meals, Starting Thu 10/17/2019, Normal      sertraline (ZOLOFT) 25 mg tablet Take 1 tablet (25 mg total) by mouth daily, Starting Mon 3/28/2022, Normal             No discharge procedures on file      PDMP Review       Value Time User    PDMP Reviewed  Yes 3/12/2021 12:40 PM Isabel Ulloa MD          ED Provider  Electronically Signed by           Ana Luisa Pereira DO  04/13/22 2453

## 2022-04-13 NOTE — Clinical Note
Victor Hugo Smith was seen and treated in our emergency department on 4/13/2022  No restrictions            Diagnosis:     Maximus Pino    He may return on this date: 04/14/2022    Mr Erendira Brown was seen in the [address] [place] [address] ER on 13 April 2022  He was discharged from the ER and can return to work on 14 April without restriction  Thank you  If you have any questions or concerns, please don't hesitate to call        Alex Juan DO    ______________________________           _______________          _______________  Hospital Representative                              Date                                Time

## 2022-04-13 NOTE — Clinical Note
Jay De Dios was seen and treated in our emergency department on 4/13/2022  Diagnosis:     Zac Cade    He may return on this date: If you have any questions or concerns, please don't hesitate to call        Дмитрий Mendoza DO    ______________________________           _______________          _______________  Hospital Representative                              Date                                Time

## 2022-04-14 ENCOUNTER — TELEPHONE (OUTPATIENT)
Dept: FAMILY MEDICINE CLINIC | Facility: CLINIC | Age: 27
End: 2022-04-14

## 2022-04-14 ENCOUNTER — TELEPHONE (OUTPATIENT)
Dept: UROLOGY | Facility: AMBULATORY SURGERY CENTER | Age: 27
End: 2022-04-14

## 2022-04-14 DIAGNOSIS — N42.83 PROSTATIC CYST: Primary | ICD-10-CM

## 2022-04-14 NOTE — TELEPHONE ENCOUNTER
Patient Please Triage  New Patient-     What is the reason for the patients appointment? Patient was seen in the ER due to gastritis and during u/s they found prostate cyst  Patient is having no symptoms  Reviewed for appointment, however not showing appointment until June  Please review for patient appointment time frame  What office location does the patient prefer? Patient requesting to be seen at [place] [facility] [place]     Imaging/Lab Results: in Epic from ER    Do we accept the patient's insurance or is the patient Self-Pay? Yes  Insurance Provider: [employer]  Plan Type/Number:  Member ID#: Has the patient had any previous Urologist(s)? No    Have patient records been requested? If not are records showing in Epic: N/A    Has the patient had any outside testing done? Yes, in ER    Does the patient have a personal history of cancer?  No

## 2022-04-14 NOTE — TELEPHONE ENCOUNTER
Pt calling, was in hospital yesterday  He had a CT scan done and it showed a cyst  He wanted to talk to you about it  He is questioning what needs to be done or where he needs to go from here  Please advise  Thank you

## 2022-04-14 NOTE — TELEPHONE ENCOUNTER
It looks like a prostatic cyst was seen on CT scan as an incidental finding  I would recommend a referral to urology for further evaluation   I will place referral

## 2022-04-15 NOTE — TELEPHONE ENCOUNTER
Called and spoke with patient  He was scheduled for a new patient appointment in the [place] office with Juan Luis Manley on 4/18/22 @ 1:00 pm  He is aware of office location

## 2022-05-24 ENCOUNTER — OFFICE VISIT (OUTPATIENT)
Dept: UROLOGY | Facility: CLINIC | Age: 27
End: 2022-05-24
Payer: COMMERCIAL

## 2022-05-24 VITALS
BODY MASS INDEX: 30.62 KG/M2 | HEIGHT: 68 IN | DIASTOLIC BLOOD PRESSURE: 72 MMHG | WEIGHT: 202 LBS | SYSTOLIC BLOOD PRESSURE: 122 MMHG

## 2022-05-24 DIAGNOSIS — N42.83 PROSTATIC CYST: ICD-10-CM

## 2022-05-24 PROCEDURE — 99203 OFFICE O/P NEW LOW 30 MIN: CPT

## 2022-05-24 PROCEDURE — 3008F BODY MASS INDEX DOCD: CPT

## 2022-05-24 PROCEDURE — 1036F TOBACCO NON-USER: CPT

## 2022-05-24 NOTE — PROGRESS NOTES
5/24/2022    Chief Complaint   Patient presents with    New Patient Visit       Assessment and Plan    27 y o  male new patient to office    1  Prostatic Cyst  · CT abdomen pelvis w contrast 4/13/2022  · A hypodensity seen within the anterior aspect of the prostate, measuring 1 5 cm probably a prostatic cyst   · Imaging was review with Dr Shayan Eagle and compared to CT abdomen and pelvis w contrast from 2019  · ANDRAE reveals smooth symmetric prostate, no palpable nodule or mass  Benign exam  · Patient denies urinary symptoms, or pain  · Follow-up 1 year      History of Present Illness  Mary Chand  is a 27 y o  male here for evaluation of prostatic cyst found on CT abdomen pelvis with contrast from 04/13/2022  This was a incidental finding as the patient [facility] for complaints of epigastric abdominal pain  Patient denies any rectal or perineal pain or discomfort  He denies any urinary symptoms, fever, chills, gross hematuria, abdominal pain, or flank pain  CT imaging was reviewed by Dr Shayan Eagle and compared to CT abdomen pelvis with contrast from 2019  Review of Systems   Constitutional: Negative for chills and fever  HENT: Negative for congestion and sore throat  Respiratory: Negative for cough and shortness of breath  Cardiovascular: Negative for chest pain and leg swelling  Gastrointestinal: Negative for abdominal pain, constipation, diarrhea, nausea and vomiting  Genitourinary: Negative for decreased urine volume, difficulty urinating, dysuria, flank pain, frequency, hematuria, penile pain, penile swelling, testicular pain and urgency  Musculoskeletal: Negative for back pain and gait problem  Skin: Negative for wound  Allergic/Immunologic: Negative for immunocompromised state  Hematological: Does not bruise/bleed easily                 Vitals  Vitals:    05/24/22 1351   BP: 122/72   Weight: 91 6 kg (202 lb)   Height: 5' 8" (1 727 m) Physical Exam  Vitals reviewed  Constitutional:       General: He is not in acute distress  Appearance: Normal appearance  He is not ill-appearing or toxic-appearing  HENT:      Head: Normocephalic and atraumatic  Eyes:      General: No scleral icterus  Conjunctiva/sclera: Conjunctivae normal    Cardiovascular:      Rate and Rhythm: Normal rate  Pulmonary:      Effort: Pulmonary effort is normal  No respiratory distress  Abdominal:      Tenderness: There is no abdominal tenderness  There is no right CVA tenderness or left CVA tenderness  Hernia: No hernia is present  Genitourinary:     Comments: ANDRAE reveals smooth symmetric prostate, no palpable nodules or masses  Benign exam  Musculoskeletal:      Cervical back: Normal range of motion  Right lower leg: No edema  Left lower leg: No edema  Skin:     General: Skin is warm and dry  Coloration: Skin is not jaundiced or pale  Neurological:      General: No focal deficit present  Mental Status: He is alert and oriented to person, place, and time  Mental status is at baseline  Gait: Gait normal    Psychiatric:         Mood and Affect: Mood normal          Behavior: Behavior normal          Thought Content:  Thought content normal          Judgment: Judgment normal          Past History  Past Medical History:   Diagnosis Date    Acute appendicitis 3/12/2021    Anxiety attack     last assessed 02/18/15    Back complaints     Palpitations     last assessed 03/19/15    Psychiatric disorder     Reactive airway disease     last assessed 02/18/15    Syncope     last assessed 09/29/16    Tachyarrhythmia     last assessed 03/19/15     Social History     Socioeconomic History    Marital status: Single     Spouse name: None    Number of children: None    Years of education: None    Highest education level: None   Occupational History    None   Tobacco Use    Smoking status: Former Smoker     Packs/day: 0 25 Years: 15 00     Pack years: 3 75     Types: Cigarettes    Smokeless tobacco: Current User     Types: Chew   Vaping Use    Vaping Use: Never used   Substance and Sexual Activity    Alcohol use: Not Currently     Comment: WEEKENDS- social    Drug use: Never    Sexual activity: Yes   Other Topics Concern    None   Social History Narrative    No living will     Social Determinants of Health     Financial Resource Strain: Not on file   Food Insecurity: Not on file   Transportation Needs: Not on file   Physical Activity: Not on file   Stress: Not on file   Social Connections: Not on file   Intimate Partner Violence: Not on file   Housing Stability: Not on file     Social History     Tobacco Use   Smoking Status Former Smoker    Packs/day: 0 25    Years: 15 00    Pack years: 3 75    Types: Cigarettes   Smokeless Tobacco Current User    Types: Chew     Family History   Problem Relation Age of Onset    No Known Problems Mother     No Known Problems Father        The following portions of the patient's history were reviewed and updated as appropriate allergies, current medications, past medical history, past social history, past surgical history and problem list    Imaging:    CT ABDOMEN AND PELVIS WITH IV CONTRAST      4/13/2022     INDICATION:   Epigastric pain  epigastric abd pain +distention; prior appendectomy      COMPARISON: CT from March 12, 2021     TECHNIQUE:  CT examination of the abdomen and pelvis was performed  Axial, sagittal, and coronal 2D reformatted images were created from the source data and submitted for interpretation      Radiation dose length product (DLP) for this visit:  631 mGy-cm     This examination, like all CT scans performed in the [facility], was performed utilizing techniques to minimize radiation dose exposure, including the use of iterative   reconstruction and automated exposure control      IV Contrast:  100 mL of iohexol (OMNIPAQUE)  Enteric Contrast: Enteric contrast was not administered      FINDINGS:     ABDOMEN     LOWER CHEST:  Bibasilar density seen compatible with atelectasis     LIVER/BILIARY TREE:  Unremarkable      GALLBLADDER:  No calcified gallstones  No pericholecystic inflammatory change      SPLEEN:  The spleen measures 12 4 cm     PANCREAS:  No pancreatic ductal dilation or atrophy     ADRENAL GLANDS:  Unremarkable      KIDNEYS/URETERS:  Unremarkable  No hydronephrosis      STOMACH AND BOWEL:  Diverticulosis seen  No abnormal dilation of the small bowel loops seen     APPENDIX:  Appendix is surgically absent     ABDOMINOPELVIC CAVITY:  No ascites  No pneumoperitoneum  No lymphadenopathy      VESSELS:  Unremarkable for patient's age      PELVIS     REPRODUCTIVE ORGANS: Prostate remains unchanged  A hypodensity seen within the anterior aspect of the prostate, measuring 1 5 cm probably a prostatic cyst      URINARY BLADDER:  Unremarkable  Evaluation degraded by streak artifact from the right acetabular and ischial prosthesis hardware  ABDOMINAL WALL/INGUINAL REGIONS:  Unremarkable      OSSEOUS STRUCTURES:  No acute compression collapse vertebra  No gross lytic lesion  Screws noted through the right ischial tuberosity  Plate-screw fixation of the right acetabulum  IMPRESSION:  No acute inflammatory stranding  No bowel obstruction     1 5 cm rounded hypodensity seen in the anterior aspect of the prostate in image 82 series 2 may be a prostate cyst in the anterior transition zone, of doubtful significance in this young patient  If needed this can be definitely characterized with MRI     The study was marked in EPIC for significant notification  Results  No results found for this or any previous visit (from the past 1 hour(s))  ]  No results found for: PSA  Lab Results   Component Value Date    CALCIUM 9 5 04/13/2022    K 4 2 04/13/2022    CO2 28 04/13/2022     04/13/2022    BUN 9 04/13/2022    CREATININE 1 08 04/13/2022     Lab Results Component Value Date    WBC 5 68 04/13/2022    HGB 14 7 04/13/2022    HCT 41 3 04/13/2022    MCV 87 04/13/2022     04/13/2022       Please Note:  Voice dictation software has been used to create this document  There may be inadvertent transcriptions errors       Bridgette Estrada

## 2022-07-25 ENCOUNTER — OFFICE VISIT (OUTPATIENT)
Dept: FAMILY MEDICINE CLINIC | Facility: CLINIC | Age: 27
End: 2022-07-25
Payer: COMMERCIAL

## 2022-07-25 VITALS
BODY MASS INDEX: 30.92 KG/M2 | TEMPERATURE: 98.1 F | DIASTOLIC BLOOD PRESSURE: 82 MMHG | HEART RATE: 74 BPM | OXYGEN SATURATION: 96 % | HEIGHT: 68 IN | WEIGHT: 204 LBS | SYSTOLIC BLOOD PRESSURE: 104 MMHG

## 2022-07-25 DIAGNOSIS — F41.1 GENERALIZED ANXIETY DISORDER: Primary | ICD-10-CM

## 2022-07-25 DIAGNOSIS — B34.9 VIRAL INFECTION, UNSPECIFIED: ICD-10-CM

## 2022-07-25 PROBLEM — IMO0001 SMOKING: Status: RESOLVED | Noted: 2021-03-12 | Resolved: 2022-07-25

## 2022-07-25 PROBLEM — F17.200 SMOKING: Status: RESOLVED | Noted: 2021-03-12 | Resolved: 2022-07-25

## 2022-07-25 PROBLEM — J02.9 PHARYNGITIS, ACUTE: Status: RESOLVED | Noted: 2017-09-01 | Resolved: 2022-07-25

## 2022-07-25 PROCEDURE — U0005 INFEC AGEN DETEC AMPLI PROBE: HCPCS | Performed by: FAMILY MEDICINE

## 2022-07-25 PROCEDURE — 99213 OFFICE O/P EST LOW 20 MIN: CPT | Performed by: FAMILY MEDICINE

## 2022-07-25 PROCEDURE — U0003 INFECTIOUS AGENT DETECTION BY NUCLEIC ACID (DNA OR RNA); SEVERE ACUTE RESPIRATORY SYNDROME CORONAVIRUS 2 (SARS-COV-2) (CORONAVIRUS DISEASE [COVID-19]), AMPLIFIED PROBE TECHNIQUE, MAKING USE OF HIGH THROUGHPUT TECHNOLOGIES AS DESCRIBED BY CMS-2020-01-R: HCPCS | Performed by: FAMILY MEDICINE

## 2022-07-25 NOTE — PROGRESS NOTES
BMI Counseling: Body mass index is 31 02 kg/m²  The BMI is above normal  Nutrition recommendations include decreasing portion sizes, encouraging healthy choices of fruits and vegetables, moderation in carbohydrate intake and increasing intake of lean protein  Exercise recommendations include moderate physical activity 150 minutes/week  Rationale for BMI follow-up plan is due to patient being overweight or obese  Assessment/Plan:    Problem List Items Addressed This Visit        Other    Generalized anxiety disorder - Primary           Diagnoses and all orders for this visit:    Generalized anxiety disorder        No problem-specific Assessment & Plan notes found for this encounter  Subjective:      Patient ID: Sweetie Kay  is a 27 y o  male  Brigida Preston is here today chief complaint is that he had a syncopal episode he got up to go to work and he then became very diaphoretic became syncopal and actually passed out for a short period of time he was treated released from the local emergency room on he now is in need of an FMLA form to be filled out on he does not have a full series of COVID boosters actually never even got the 2nd shot of COVID so that enters into the differential possibilities I am going to swab him today he does have chronic sinus infection so he would never know if he had a COVID infection on other than that he is seems very healthy his vital signs are stable blood pressure is good      The following portions of the patient's history were reviewed and updated as appropriate:   He has a past medical history of Acute appendicitis (3/12/2021), Anxiety attack, Back complaints, Palpitations, Psychiatric disorder, Reactive airway disease, Syncope, and Tachyarrhythmia ,  does not have any pertinent problems on file  ,   has a past surgical history that includes Hip surgery (Right); Fracture surgery; and pr lap,appendectomy (N/A, 3/12/2021)  ,  family history includes No Known Problems in his father and mother  ,   reports that he quit smoking about 4 months ago  His smoking use included cigarettes  He has a 3 75 pack-year smoking history  His smokeless tobacco use includes chew  He reports previous alcohol use  He reports that he does not use drugs  ,  has No Known Allergies     Current Outpatient Medications   Medication Sig Dispense Refill    sertraline (ZOLOFT) 25 mg tablet Take 1 tablet (25 mg total) by mouth daily 30 tablet 2     No current facility-administered medications for this visit  Review of Systems   Constitutional: Negative for activity change, appetite change, diaphoresis, fatigue and fever  HENT: Negative  Negative for dental problem  Eyes: Negative  Respiratory: Negative for apnea, cough, chest tightness, shortness of breath and wheezing  Cardiovascular: Negative for chest pain, palpitations and leg swelling  Gastrointestinal: Negative for abdominal distention, abdominal pain, anal bleeding, constipation, diarrhea, nausea and vomiting  Endocrine: Negative for cold intolerance, heat intolerance, polydipsia, polyphagia and polyuria  Genitourinary: Negative for difficulty urinating, dysuria, flank pain, hematuria and urgency  Musculoskeletal: Negative for arthralgias, back pain, gait problem, joint swelling and myalgias  Skin: Negative for color change, rash and wound  Allergic/Immunologic: Negative for environmental allergies, food allergies and immunocompromised state  Neurological: Negative for dizziness, seizures, syncope, speech difficulty, numbness and headaches  Hematological: Negative for adenopathy  Does not bruise/bleed easily  Psychiatric/Behavioral: Negative for agitation, behavioral problems, hallucinations, sleep disturbance and suicidal ideas           Objective:  Vitals:    07/25/22 1002   BP: 104/82   BP Location: Left arm   Patient Position: Sitting   Cuff Size: Standard   Pulse: 74   Temp: 98 1 °F (36 7 °C)   TempSrc: Temporal   SpO2: 96%   Weight: 92 5 kg (204 lb)   Height: 5' 8" (1 727 m)     Body mass index is 31 02 kg/m²  Physical Exam  Constitutional:       General: He is not in acute distress  Appearance: He is well-developed  He is not diaphoretic  HENT:      Head: Normocephalic  Right Ear: External ear normal       Left Ear: External ear normal       Nose: Nose normal    Eyes:      General: No scleral icterus  Right eye: No discharge  Left eye: No discharge  Conjunctiva/sclera: Conjunctivae normal       Pupils: Pupils are equal, round, and reactive to light  Neck:      Thyroid: No thyromegaly  Trachea: No tracheal deviation  Cardiovascular:      Rate and Rhythm: Normal rate and regular rhythm  Heart sounds: Normal heart sounds  No murmur heard  No friction rub  No gallop  Pulmonary:      Effort: Pulmonary effort is normal  No respiratory distress  Breath sounds: Normal breath sounds  No wheezing  Abdominal:      General: Bowel sounds are normal       Palpations: Abdomen is soft  There is no mass  Tenderness: There is no abdominal tenderness  There is no guarding  Musculoskeletal:         General: No deformity  Cervical back: Normal range of motion  Lymphadenopathy:      Cervical: No cervical adenopathy  Skin:     General: Skin is warm and dry  Findings: No erythema or rash  Neurological:      Mental Status: He is alert and oriented to person, place, and time  Cranial Nerves: No cranial nerve deficit  Psychiatric:         Thought Content:  Thought content normal

## 2022-07-25 NOTE — LETTER
July 25, 2022     Patient: Miriam Zapata  YOB: 1995  Date of Visit: 7/25/2022      To Whom it May Concern:    Evelyn Talavera is under my professional care  Rosalia Pearce was seen in my office on 7/25/2022  Rosalia Pearce may return to work on July 25, 2022  Patient was absent from work due to illness on July 23, 2022       If you have any questions or concerns, please don't hesitate to call           Sincerely,          Pita Salas DO        CC: No Recipients

## 2022-07-26 LAB — SARS-COV-2 RNA RESP QL NAA+PROBE: NEGATIVE

## 2022-08-19 DIAGNOSIS — F41.1 GENERALIZED ANXIETY DISORDER: ICD-10-CM

## 2022-08-19 RX ORDER — SERTRALINE HYDROCHLORIDE 25 MG/1
25 TABLET, FILM COATED ORAL DAILY
Qty: 30 TABLET | Refills: 2 | Status: SHIPPED | OUTPATIENT
Start: 2022-08-19 | End: 2022-09-20 | Stop reason: SDUPTHER

## 2022-09-20 DIAGNOSIS — F41.1 GENERALIZED ANXIETY DISORDER: ICD-10-CM

## 2022-09-20 RX ORDER — SERTRALINE HYDROCHLORIDE 25 MG/1
25 TABLET, FILM COATED ORAL DAILY
Qty: 30 TABLET | Refills: 2 | Status: SHIPPED | OUTPATIENT
Start: 2022-09-20 | End: 2022-10-12 | Stop reason: SDUPTHER

## 2022-10-12 DIAGNOSIS — F41.1 GENERALIZED ANXIETY DISORDER: ICD-10-CM

## 2022-10-13 RX ORDER — SERTRALINE HYDROCHLORIDE 25 MG/1
25 TABLET, FILM COATED ORAL DAILY
Qty: 30 TABLET | Refills: 2 | Status: SHIPPED | OUTPATIENT
Start: 2022-10-13

## 2022-11-02 ENCOUNTER — OFFICE VISIT (OUTPATIENT)
Dept: URGENT CARE | Facility: MEDICAL CENTER | Age: 27
End: 2022-11-02

## 2022-11-02 VITALS
RESPIRATION RATE: 20 BRPM | OXYGEN SATURATION: 100 % | HEART RATE: 87 BPM | HEIGHT: 68 IN | WEIGHT: 195 LBS | SYSTOLIC BLOOD PRESSURE: 118 MMHG | DIASTOLIC BLOOD PRESSURE: 76 MMHG | BODY MASS INDEX: 29.55 KG/M2 | TEMPERATURE: 98.1 F

## 2022-11-02 DIAGNOSIS — H65.91 RIGHT NON-SUPPURATIVE OTITIS MEDIA: Primary | ICD-10-CM

## 2022-11-02 RX ORDER — AMOXICILLIN AND CLAVULANATE POTASSIUM 875; 125 MG/1; MG/1
1 TABLET, FILM COATED ORAL EVERY 12 HOURS SCHEDULED
Qty: 14 TABLET | Refills: 0 | Status: SHIPPED | OUTPATIENT
Start: 2022-11-02 | End: 2022-11-09

## 2022-11-02 NOTE — PROGRESS NOTES
[facility]        NAME: Maldonado Sharpe  is a 27 y o  male  : 1995    MRN: [id number]  DATE: 2022  TIME: 11:01 AM    Assessment and Plan   Right non-suppurative otitis media [H65 91]  1  Right non-suppurative otitis media  amoxicillin-clavulanate (AUGMENTIN) 875-125 mg per tablet         Patient Instructions     Patient Instructions     Ear Infection   WHAT YOU NEED TO KNOW:   An ear infection is also called otitis media  Blocked or swollen eustachian tubes can cause an infection  Eustachian tubes connect the middle ear to the back of the nose and throat  They drain fluid from the middle ear  You may have a buildup of fluid in your ear  Germs build up in the fluid and infection develops  DISCHARGE INSTRUCTIONS:   Return to the emergency department if:   · You have clear fluid coming from your ear  · You have a stiff neck, headache, and a fever  Call your doctor if:   · You see blood or pus draining from your ear  · Your ear pain gets worse or does not go away, even after treatment  · The outside of your ear is red or swollen  · You are vomiting or have diarrhea  · You have questions or concerns about your condition or care  Medicines: You may  need any of the following:  · Acetaminophen  decreases pain and fever  It is available without a doctor's order  Ask how much to take and how often to take it  Follow directions  Read the labels of all other medicines you are using to see if they also contain acetaminophen, or ask your doctor or pharmacist  Acetaminophen can cause liver damage if not taken correctly  Do not use more than 4 grams (4,000 milligrams) total of acetaminophen in one day  · NSAIDs , such as ibuprofen, help decrease swelling, pain, and fever  This medicine is available with or without a doctor's order  NSAIDs can cause stomach bleeding or kidney problems in certain people   If you take blood thinner medicine, always ask your healthcare provider if NSAIDs are safe for you  Always read the medicine label and follow directions  · Ear drops  may contain medicine to decrease pain and inflammation  · Antibiotics  help treat a bacterial infection  · Take your medicine as directed  Contact your healthcare provider if you think your medicine is not helping or if you have side effects  Tell him or her if you are allergic to any medicine  Keep a list of the medicines, vitamins, and herbs you take  Include the amounts, and when and why you take them  Bring the list or the pill bottles to follow-up visits  Carry your medicine list with you in case of an emergency  Self-care:   · Apply heat  on your ear for 15 to 20 minutes, 3 to 4 times a day or as directed  You can apply heat with an electric heating pad, hot water bottle, or warm compress  Always put a cloth between your skin and the heat pack to prevent burns  Heat helps decrease pain  · Apply ice  on your ear for 15 to 20 minutes, 3 to 4 times a day for 2 days or as directed  Use an ice pack, or put crushed ice in a plastic bag  Cover it with a towel before you apply it to your ear  Ice decreases swelling and pain  Prevent an ear infection:   · Wash your hands often  to help prevent the spread of germs  Ask everyone in your house to wash their hands with soap and water  Ask them to wash after they use the bathroom or change a diaper  Remind them to wash before they prepare or eat food  · Stay away from people who are ill  Some germs spread easily and quickly through contact  Follow up with your doctor as directed:  Write down your questions so you remember to ask them during your visits  © Copyright [employer] 2022 Information is for End User's use only and may not be sold, redistributed or otherwise used for commercial purposes   All illustrations and images included in CareNotes® are the copyrighted property of [employer]  or Suzy Lacey  The above information is an  only  It is not intended as medical advice for individual conditions or treatments  Talk to your doctor, nurse or pharmacist before following any medical regimen to see if it is safe and effective for you  **Portions of the record may have been created with voice recognition software  Occasional wrong word or "sound a like" substitutions may have occurred due to the inherent limitations of voice recognition software  Read the chart carefully and recognize, using context, where substitutions have occurred  **     Chief Complaint     Chief Complaint   Patient presents with   • Earache     Right ear pain, took otc pain meds with slight relief, denies headache, fever, nasal congestion or sore throat          History of Present Illness     Garfield Dukes  is a 27 y o  male presents to clinic today with complaints of right ear x 1 day  reprots pain with jaw movement  Denies fever, chills, dizziness, ringing in ears, congestion or cough  Review of Systems     Review of Systems   Constitutional: Negative for appetite change, chills and fever  HENT: Positive for ear pain  Negative for congestion, ear discharge, facial swelling, mouth sores, postnasal drip, sinus pressure, sinus pain and sore throat  Eyes: Negative for discharge and redness  Respiratory: Negative for cough and shortness of breath  Cardiovascular: Negative for chest pain  Gastrointestinal: Negative for diarrhea, nausea and vomiting  Musculoskeletal: Negative for myalgias  Skin: Negative for rash  Neurological: Negative for dizziness and headaches           Current Medications     Current Outpatient Medications:   •  amoxicillin-clavulanate (AUGMENTIN) 875-125 mg per tablet, Take 1 tablet by mouth every 12 (twelve) hours for 7 days, Disp: 14 tablet, Rfl: 0  •  sertraline (ZOLOFT) 25 mg tablet, Take 1 tablet (25 mg total) by mouth daily, Disp: 30 tablet, Rfl: 2    Current Allergies     Allergies as of 11/02/2022   • (No Known Allergies)            The following portions of the patient's history were reviewed and updated as appropriate: allergies, current medications, past family history, past medical history, past social history, past surgical history and problem list      Past Medical History:   Diagnosis Date   • Acute appendicitis 3/12/2021   • Anxiety attack     last assessed 02/18/15   • Back complaints    • Palpitations     last assessed 03/19/15   • Psychiatric disorder    • Reactive airway disease     last assessed 02/18/15   • Syncope     last assessed 09/29/16   • Tachyarrhythmia     last assessed 03/19/15       Past Surgical History:   Procedure Laterality Date   • FRACTURE SURGERY     • HIP SURGERY Right    • HI LAP,APPENDECTOMY N/A 3/12/2021    Procedure: APPENDECTOMY LAPAROSCOPIC;  Surgeon: Deepti Russo MD;  Location: [facility];  Service: General       Family History   Problem Relation Age of Onset   • No Known Problems Mother    • No Known Problems Father          Medications have been verified  Objective     /76   Pulse 87   Temp 98 1 °F (36 7 °C)   Resp 20   Ht 5' 8" (1 727 m)   Wt 88 5 kg (195 lb)   SpO2 100%   BMI 29 65 kg/m²        Physical Exam     Physical Exam  Vitals and nursing note reviewed  Constitutional:       General: He is not in acute distress  Appearance: Normal appearance  He is not ill-appearing  HENT:      Head: Normocephalic and atraumatic  Right Ear: Tympanic membrane is erythematous and bulging  Left Ear: Tympanic membrane normal       Nose: Nose normal       Mouth/Throat:      Mouth: Mucous membranes are moist       Pharynx: Oropharynx is clear  Cardiovascular:      Rate and Rhythm: Normal rate and regular rhythm  Pulses: Normal pulses  Heart sounds: Normal heart sounds  Pulmonary:      Effort: Pulmonary effort is normal       Breath sounds: Normal breath sounds     Lymphadenopathy:      Cervical: No cervical adenopathy  Skin:     General: Skin is warm and dry  Findings: No rash  Neurological:      Mental Status: He is alert

## 2022-11-02 NOTE — LETTER
November 2, 2022     Patient: Mavis Donaldson  YOB: 1995   Date of Visit: 11/2/2022       To Whom it May Concern:    Tirso Sena was seen in my clinic on 11/2/2022  He is being treated for an ear infection  He may return to work on 11/02/2022  If you have any questions or concerns, please don't hesitate to call           Sincerely,          Devin Mai PA-C        CC: No Recipients

## 2022-11-02 NOTE — PATIENT INSTRUCTIONS
Ear Infection   WHAT YOU NEED TO KNOW:   An ear infection is also called otitis media  Blocked or swollen eustachian tubes can cause an infection  Eustachian tubes connect the middle ear to the back of the nose and throat  They drain fluid from the middle ear  You may have a buildup of fluid in your ear  Germs build up in the fluid and infection develops  DISCHARGE INSTRUCTIONS:   Return to the emergency department if:   You have clear fluid coming from your ear  You have a stiff neck, headache, and a fever  Call your doctor if:   You see blood or pus draining from your ear  Your ear pain gets worse or does not go away, even after treatment  The outside of your ear is red or swollen  You are vomiting or have diarrhea  You have questions or concerns about your condition or care  Medicines: You may  need any of the following:  Acetaminophen  decreases pain and fever  It is available without a doctor's order  Ask how much to take and how often to take it  Follow directions  Read the labels of all other medicines you are using to see if they also contain acetaminophen, or ask your doctor or pharmacist  Acetaminophen can cause liver damage if not taken correctly  Do not use more than 4 grams (4,000 milligrams) total of acetaminophen in one day  NSAIDs , such as ibuprofen, help decrease swelling, pain, and fever  This medicine is available with or without a doctor's order  NSAIDs can cause stomach bleeding or kidney problems in certain people  If you take blood thinner medicine, always ask your healthcare provider if NSAIDs are safe for you  Always read the medicine label and follow directions  Ear drops  may contain medicine to decrease pain and inflammation  Antibiotics  help treat a bacterial infection  Take your medicine as directed  Contact your healthcare provider if you think your medicine is not helping or if you have side effects   Tell him or her if you are allergic to any medicine  Keep a list of the medicines, vitamins, and herbs you take  Include the amounts, and when and why you take them  Bring the list or the pill bottles to follow-up visits  Carry your medicine list with you in case of an emergency  Self-care:   Apply heat  on your ear for 15 to 20 minutes, 3 to 4 times a day or as directed  You can apply heat with an electric heating pad, hot water bottle, or warm compress  Always put a cloth between your skin and the heat pack to prevent burns  Heat helps decrease pain  Apply ice  on your ear for 15 to 20 minutes, 3 to 4 times a day for 2 days or as directed  Use an ice pack, or put crushed ice in a plastic bag  Cover it with a towel before you apply it to your ear  Ice decreases swelling and pain  Prevent an ear infection:   Wash your hands often  to help prevent the spread of germs  Ask everyone in your house to wash their hands with soap and water  Ask them to wash after they use the bathroom or change a diaper  Remind them to wash before they prepare or eat food  Stay away from people who are ill  Some germs spread easily and quickly through contact  Follow up with your doctor as directed:  Write down your questions so you remember to ask them during your visits  © Copyright [employer] 2022 Information is for End User's use only and may not be sold, redistributed or otherwise used for commercial purposes  All illustrations and images included in CareNotes® are the copyrighted property of [employer]  or Suzy Lacey  The above information is an  only  It is not intended as medical advice for individual conditions or treatments  Talk to your doctor, nurse or pharmacist before following any medical regimen to see if it is safe and effective for you

## 2022-11-14 ENCOUNTER — OFFICE VISIT (OUTPATIENT)
Dept: FAMILY MEDICINE CLINIC | Facility: CLINIC | Age: 27
End: 2022-11-14

## 2022-11-14 VITALS
SYSTOLIC BLOOD PRESSURE: 118 MMHG | HEIGHT: 68 IN | OXYGEN SATURATION: 97 % | DIASTOLIC BLOOD PRESSURE: 66 MMHG | WEIGHT: 198 LBS | HEART RATE: 79 BPM | TEMPERATURE: 98 F | BODY MASS INDEX: 30.01 KG/M2

## 2022-11-14 DIAGNOSIS — M26.621 ARTHRALGIA OF RIGHT TEMPOROMANDIBULAR JOINT: Primary | ICD-10-CM

## 2022-11-14 NOTE — PROGRESS NOTES
Assessment/Plan:    Problem List Items Addressed This Visit    None     Visit Diagnoses     Arthralgia of right temporomandibular joint    -  Primary           Diagnoses and all orders for this visit:    Arthralgia of right temporomandibular joint      Reassured him that ear infection has resolved  Recommended ice, ibuprofen, and chewing soft foods  He will notify us if symptoms do not improve or worsen     Subjective:      Patient ID: Oralia Nielsen  is a 27 y o  male  Dionne Rocha is a pleasant 27year old male who is here today complaining of pain in his right ear that radiates into his jaw  He has been having the jaw pain on and off for a few weeks  2 weeks ago it got worse and he developed ear pain, so he was seen at Urgent Care and treated for otitis media  He finished the antibiotic  The ear pain improved, but he still has pain in his jaw  It is worse first thing in the morning or when chewing  He did take ibuprofen, which does provide relief  He denies any ear pain, drainage, fevers, or chills  The following portions of the patient's history were reviewed and updated as appropriate:   He has a past medical history of Acute appendicitis (3/12/2021), Anxiety attack, Back complaints, Palpitations, Psychiatric disorder, Reactive airway disease, Syncope, and Tachyarrhythmia ,  does not have any pertinent problems on file  ,   has a past surgical history that includes Hip surgery (Right); Fracture surgery; and pr lap,appendectomy (N/A, 3/12/2021)  ,  family history includes No Known Problems in his father and mother  ,   reports that he has been smoking cigarettes  He has smoked for the past 15 00 years  His smokeless tobacco use includes chew  He reports previous alcohol use  He reports that he does not use drugs  ,  has No Known Allergies     Current Outpatient Medications   Medication Sig Dispense Refill   • sertraline (ZOLOFT) 25 mg tablet Take 1 tablet (25 mg total) by mouth daily 30 tablet 2     No current facility-administered medications for this visit  Review of Systems   Constitutional: Negative for chills, diaphoresis, fatigue and fever  HENT: Positive for ear pain (right)  Negative for congestion, postnasal drip, rhinorrhea, sneezing, sore throat and trouble swallowing  +Right sided jaw pain   Eyes: Negative for pain and visual disturbance  Respiratory: Negative for apnea, cough, shortness of breath and wheezing  Cardiovascular: Negative for chest pain and palpitations  Gastrointestinal: Negative for abdominal pain, constipation, diarrhea, nausea and vomiting  Genitourinary: Negative for dysuria and hematuria  Musculoskeletal: Negative for arthralgias, gait problem and myalgias  Neurological: Negative for dizziness, syncope, weakness, light-headedness, numbness and headaches  Psychiatric/Behavioral: Negative for suicidal ideas  The patient is not nervous/anxious  Objective:  Vitals:    11/14/22 1438   BP: 118/66   Pulse: 79   Temp: 98 °F (36 7 °C)   SpO2: 97%   Weight: 89 8 kg (198 lb)   Height: 5' 8" (1 727 m)     Body mass index is 30 11 kg/m²  Physical Exam  Vitals and nursing note reviewed  Constitutional:       Appearance: He is well-developed  HENT:      Head: Normocephalic and atraumatic  Jaw: Tenderness (right TMJ) present  Right Ear: Tympanic membrane, ear canal and external ear normal       Left Ear: Tympanic membrane, ear canal and external ear normal       Nose: Nose normal       Mouth/Throat:      Pharynx: No oropharyngeal exudate or posterior oropharyngeal erythema  Eyes:      Extraocular Movements: Extraocular movements intact  Cardiovascular:      Rate and Rhythm: Normal rate and regular rhythm  Heart sounds: Normal heart sounds  No murmur heard  No friction rub  No gallop  Pulmonary:      Effort: Pulmonary effort is normal  No respiratory distress  Breath sounds: Normal breath sounds  No wheezing or rales  Musculoskeletal:         General: Normal range of motion  Cervical back: Normal range of motion and neck supple  Lymphadenopathy:      Cervical: No cervical adenopathy  Skin:     General: Skin is warm and dry  Neurological:      Mental Status: He is alert and oriented to person, place, and time  Psychiatric:         Behavior: Behavior normal          Thought Content:  Thought content normal          Judgment: Judgment normal

## 2022-11-14 NOTE — LETTER
November 14, 2022     Patient: Zaheer Gaxiola  YOB: 1995  Date of Visit: 11/14/2022      To Whom it May Concern:    Gallo Bryansteve is under my professional care  Brigitte Hylton was seen in my office on 11/14/2022 for TMJ  Brigitte Hylton may return to work on 11/15/2022 with no restrictions  If you have any questions or concerns, please don't hesitate to call           Sincerely,          Jacqueline Fry PA-C

## 2022-11-21 DIAGNOSIS — F41.1 GENERALIZED ANXIETY DISORDER: ICD-10-CM

## 2022-11-21 RX ORDER — SERTRALINE HYDROCHLORIDE 25 MG/1
25 TABLET, FILM COATED ORAL DAILY
Qty: 30 TABLET | Refills: 2 | Status: SHIPPED | OUTPATIENT
Start: 2022-11-21

## 2023-01-04 DIAGNOSIS — F41.1 GENERALIZED ANXIETY DISORDER: ICD-10-CM

## 2023-01-04 RX ORDER — SERTRALINE HYDROCHLORIDE 25 MG/1
25 TABLET, FILM COATED ORAL DAILY
Qty: 30 TABLET | Refills: 2 | Status: SHIPPED | OUTPATIENT
Start: 2023-01-04

## 2023-02-13 DIAGNOSIS — F41.1 GENERALIZED ANXIETY DISORDER: ICD-10-CM

## 2023-02-13 RX ORDER — SERTRALINE HYDROCHLORIDE 25 MG/1
25 TABLET, FILM COATED ORAL DAILY
Qty: 30 TABLET | Refills: 2 | Status: SHIPPED | OUTPATIENT
Start: 2023-02-13

## 2023-03-04 ENCOUNTER — HOSPITAL ENCOUNTER (EMERGENCY)
Facility: HOSPITAL | Age: 28
Discharge: HOME | End: 2023-03-04
Attending: EMERGENCY MEDICINE

## 2023-03-04 VITALS
RESPIRATION RATE: 22 BRPM | HEART RATE: 79 BPM | SYSTOLIC BLOOD PRESSURE: 113 MMHG | OXYGEN SATURATION: 96 % | DIASTOLIC BLOOD PRESSURE: 57 MMHG | TEMPERATURE: 98.1 F

## 2023-03-04 DIAGNOSIS — R10.13 EPIGASTRIC PAIN: ICD-10-CM

## 2023-03-04 DIAGNOSIS — R11.2 NAUSEA VOMITING AND DIARRHEA: Primary | ICD-10-CM

## 2023-03-04 DIAGNOSIS — R19.7 NAUSEA VOMITING AND DIARRHEA: Primary | ICD-10-CM

## 2023-03-04 LAB
ALBUMIN SERPL BCP-MCNC: 4.9 G/DL (ref 3.5–5)
ALP SERPL-CCNC: 57 U/L (ref 34–104)
ALT SERPL W P-5'-P-CCNC: 34 U/L (ref 7–52)
ANION GAP SERPL CALCULATED.3IONS-SCNC: 10 MMOL/L (ref 4–13)
AST SERPL W P-5'-P-CCNC: 18 U/L (ref 13–39)
ATRIAL RATE: 87 BPM
BASOPHILS # BLD AUTO: 0.03 THOUSANDS/ÂΜL (ref 0–0.1)
BASOPHILS NFR BLD AUTO: 0 % (ref 0–1)
BILIRUB SERPL-MCNC: 0.63 MG/DL (ref 0.2–1)
BUN SERPL-MCNC: 18 MG/DL (ref 5–25)
CALCIUM SERPL-MCNC: 9.6 MG/DL (ref 8.4–10.2)
CHLORIDE SERPL-SCNC: 102 MMOL/L (ref 96–108)
CO2 SERPL-SCNC: 26 MMOL/L (ref 21–32)
CREAT SERPL-MCNC: 1.26 MG/DL (ref 0.6–1.3)
EOSINOPHIL # BLD AUTO: 0.13 THOUSAND/ÂΜL (ref 0–0.61)
EOSINOPHIL NFR BLD AUTO: 1 % (ref 0–6)
ERYTHROCYTE [DISTWIDTH] IN BLOOD BY AUTOMATED COUNT: 11.9 % (ref 11.6–15.1)
GFR SERPL CREATININE-BSD FRML MDRD: 76 ML/MIN/1.73SQ M
GLUCOSE SERPL-MCNC: 111 MG/DL (ref 65–140)
HCT VFR BLD AUTO: 47 % (ref 36.5–49.3)
HGB BLD-MCNC: 16.5 G/DL (ref 12–17)
IMM GRANULOCYTES # BLD AUTO: 0.04 THOUSAND/UL (ref 0–0.2)
IMM GRANULOCYTES NFR BLD AUTO: 0 % (ref 0–2)
LIPASE SERPL-CCNC: 34 U/L (ref 11–82)
LYMPHOCYTES # BLD AUTO: 0.56 THOUSANDS/ÂΜL (ref 0.6–4.47)
LYMPHOCYTES NFR BLD AUTO: 5 % (ref 14–44)
MCH RBC QN AUTO: 32.2 PG (ref 26.8–34.3)
MCHC RBC AUTO-ENTMCNC: 35.1 G/DL (ref 31.4–37.4)
MCV RBC AUTO: 92 FL (ref 82–98)
MONOCYTES # BLD AUTO: 0.46 THOUSAND/ÂΜL (ref 0.17–1.22)
MONOCYTES NFR BLD AUTO: 4 % (ref 4–12)
NEUTROPHILS # BLD AUTO: 9.18 THOUSANDS/ÂΜL (ref 1.85–7.62)
NEUTS SEG NFR BLD AUTO: 90 % (ref 43–75)
NRBC BLD AUTO-RTO: 0 /100 WBCS
P AXIS: 27 DEGREES
PLATELET # BLD AUTO: 246 THOUSANDS/UL (ref 149–390)
PMV BLD AUTO: 9.5 FL (ref 8.9–12.7)
POTASSIUM SERPL-SCNC: 4.1 MMOL/L (ref 3.5–5.3)
PR INTERVAL: 156 MS
PROT SERPL-MCNC: 7.8 G/DL (ref 6.4–8.4)
QRS AXIS: 109 DEGREES
QRSD INTERVAL: 92 MS
QT INTERVAL: 332 MS
QTC INTERVAL: 399 MS
RBC # BLD AUTO: 5.13 MILLION/UL (ref 3.88–5.62)
SODIUM SERPL-SCNC: 138 MMOL/L (ref 135–147)
T WAVE AXIS: -13 DEGREES
VENTRICULAR RATE: 87 BPM
WBC # BLD AUTO: 10.4 THOUSAND/UL (ref 4.31–10.16)

## 2023-03-04 RX ORDER — KETOROLAC TROMETHAMINE 30 MG/ML
15 INJECTION, SOLUTION INTRAMUSCULAR; INTRAVENOUS ONCE
Status: COMPLETED | OUTPATIENT
Start: 2023-03-04 | End: 2023-03-04

## 2023-03-04 RX ORDER — ONDANSETRON 2 MG/ML
4 INJECTION INTRAMUSCULAR; INTRAVENOUS ONCE
Status: COMPLETED | OUTPATIENT
Start: 2023-03-04 | End: 2023-03-04

## 2023-03-04 RX ORDER — ONDANSETRON 4 MG/1
4 TABLET, ORALLY DISINTEGRATING ORAL EVERY 6 HOURS PRN
Qty: 20 TABLET | Refills: 0 | Status: SHIPPED | OUTPATIENT
Start: 2023-03-04

## 2023-03-04 RX ADMIN — KETOROLAC TROMETHAMINE 15 MG: 30 INJECTION, SOLUTION INTRAMUSCULAR; INTRAVENOUS at 21:25

## 2023-03-04 RX ADMIN — SODIUM CHLORIDE 1000 ML: 0.9 INJECTION, SOLUTION INTRAVENOUS at 21:48

## 2023-03-04 RX ADMIN — ONDANSETRON 4 MG: 2 INJECTION INTRAMUSCULAR; INTRAVENOUS at 21:25

## 2023-03-05 NOTE — ED PROVIDER NOTES
History  Chief Complaint   Patient presents with   • Abdominal Pain     Patient states he started with abdominal pain today that radiates into his ribs and back  Patient also complains of nausea and vomiting  Colton Cummings  is a 32y o  year old male presenting to the [facility] ED for abdominal pain, vomiting and diarrhea  Patient reports 5 hours prior to arrival, had sudden onset of nausea and several episodes of nonbloody/nonbilious vomiting  Reporting epigastric discomfort which is radiating to the back  Also reporting numerous episodes of watery, green mucus diarrhea  No reported fever/chills  No reported chest pain or dyspnea  Patient denies recent sick contacts  Patient has not taken/received any medications at home for relief of symptoms  History notable for appendectomy      History provided by:  Patient and medical records   used: No    Abdominal Pain  Associated symptoms: diarrhea, nausea and vomiting    Associated symptoms: no chest pain, no chills, no cough, no dysuria, no fever, no hematuria and no shortness of breath        Prior to Admission Medications   Prescriptions Last Dose Informant Patient Reported?  Taking?   sertraline (ZOLOFT) 25 mg tablet   No No   Sig: Take 1 tablet (25 mg total) by mouth daily      Facility-Administered Medications: None       Past Medical History:   Diagnosis Date   • Acute appendicitis 3/12/2021   • Anxiety attack     last assessed 02/18/15   • Back complaints    • Palpitations     last assessed 03/19/15   • Psychiatric disorder    • Reactive airway disease     last assessed 02/18/15   • Syncope     last assessed 09/29/16   • Tachyarrhythmia     last assessed 03/19/15       Past Surgical History:   Procedure Laterality Date   • FRACTURE SURGERY     • HIP SURGERY Right    • [place] LAPAROSCOPIC APPENDECTOMY N/A 3/12/2021    Procedure: APPENDECTOMY LAPAROSCOPIC;  Surgeon: Fatuma Alcocer MD;  Location: [facility];  Service: General       Family History   Problem Relation Age of Onset   • No Known Problems Mother    • No Known Problems Father      I have reviewed and agree with the history as documented  E-Cigarette/Vaping   • E-Cigarette Use Never User      E-Cigarette/Vaping Substances   • Nicotine No    • THC No    • CBD No    • Flavoring No    • Other No    • Unknown No      Social History     Tobacco Use   • Smoking status: Some Days     Years: 15 00     Types: Cigarettes     Last attempt to quit: 2022     Years since quittin 0   • Smokeless tobacco: Current     Types: Chew   Vaping Use   • Vaping Use: Never used   Substance Use Topics   • Alcohol use: Not Currently     Comment: WEEKENDS- social   • Drug use: Never       Review of Systems   Constitutional: Negative for chills and fever  HENT: Negative for congestion  Respiratory: Negative for cough and shortness of breath  Cardiovascular: Negative for chest pain  Gastrointestinal: Positive for abdominal pain, diarrhea, nausea and vomiting  Genitourinary: Negative for dysuria, flank pain and hematuria  Musculoskeletal: Negative for myalgias  Neurological: Negative for weakness  All other systems reviewed and are negative  Physical Exam  Physical Exam  Vitals and nursing note reviewed  Constitutional:       General: He is not in acute distress  Appearance: Normal appearance  He is well-developed  He is not ill-appearing, toxic-appearing or diaphoretic  HENT:      Head: Normocephalic and atraumatic  Nose: No congestion or rhinorrhea  Mouth/Throat:      Mouth: Mucous membranes are dry  Eyes:      General:         Right eye: No discharge  Left eye: No discharge  Cardiovascular:      Rate and Rhythm: Normal rate and regular rhythm  Pulmonary:      Effort: Pulmonary effort is normal  No respiratory distress  Breath sounds: Normal breath sounds  No wheezing or rales  Abdominal:      General: There is no distension        Palpations: Abdomen is soft  Tenderness: There is abdominal tenderness in the epigastric area  There is no right CVA tenderness, left CVA tenderness, guarding or rebound  Musculoskeletal:      Cervical back: Normal range of motion  No rigidity  Skin:     General: Skin is warm  Capillary Refill: Capillary refill takes less than 2 seconds  Neurological:      Mental Status: He is alert and oriented to person, place, and time     Psychiatric:         Mood and Affect: Mood normal          Behavior: Behavior normal          Vital Signs  ED Triage Vitals   Temperature Pulse Respirations Blood Pressure SpO2   03/04/23 2013 03/04/23 2013 03/04/23 2013 03/04/23 2013 03/04/23 2013   98 1 °F (36 7 °C) 84 16 122/70 98 %      Temp Source Heart Rate Source Patient Position - Orthostatic VS BP Location FiO2 (%)   03/04/23 2013 03/04/23 2013 -- -- --   Temporal Monitor         Pain Score       03/04/23 2125       6           Vitals:    03/04/23 2013 03/04/23 2130 03/04/23 2200   BP: 122/70 119/73 113/57   Pulse: 84 82 79         Visual Acuity      ED Medications  Medications   ondansetron (ZOFRAN) injection 4 mg (4 mg Intravenous Given 3/4/23 2125)   ketorolac (TORADOL) injection 15 mg (15 mg Intravenous Given 3/4/23 2125)   sodium chloride 0 9 % bolus 1,000 mL (0 mL Intravenous Stopped 3/4/23 2248)       Diagnostic Studies  Results Reviewed     Procedure Component Value Units Date/Time    Comprehensive metabolic panel [[id number]] Collected: 03/04/23 2033    Lab Status: Final result Specimen: Blood from Arm, Right Updated: 03/04/23 2103     Sodium 138 mmol/L      Potassium 4 1 mmol/L      Chloride 102 mmol/L      CO2 26 mmol/L      ANION GAP 10 mmol/L      BUN 18 mg/dL      Creatinine 1 26 mg/dL      Glucose 111 mg/dL      Calcium 9 6 mg/dL      AST 18 U/L      ALT 34 U/L      Alkaline Phosphatase 57 U/L      Total Protein 7 8 g/dL      Albumin 4 9 g/dL      Total Bilirubin 0 63 mg/dL      eGFR 76 ml/min/1 73sq m     Narrative: National Kidney Disease Foundation guidelines for Chronic Kidney Disease (CKD):   •  Stage 1 with normal or high GFR (GFR > 90 mL/min/1 73 square meters)  •  Stage 2 Mild CKD (GFR = 60-89 mL/min/1 73 square meters)  •  Stage 3A Moderate CKD (GFR = 45-59 mL/min/1 73 square meters)  •  Stage 3B Moderate CKD (GFR = 30-44 mL/min/1 73 square meters)  •  Stage 4 Severe CKD (GFR = 15-29 mL/min/1 73 square meters)  •  Stage 5 End Stage CKD (GFR <15 mL/min/1 73 square meters)  Note: GFR calculation is accurate only with a steady state creatinine    Lipase [[id number]]  (Normal) Collected: 03/04/23 2033    Lab Status: Final result Specimen: Blood from Arm, Right Updated: 03/04/23 2103     Lipase 34 u/L     CBC and differential [[id number]]  (Abnormal) Collected: 03/04/23 2033    Lab Status: Final result Specimen: Blood from Arm, Right Updated: 03/04/23 2052     WBC 10 40 Thousand/uL      RBC 5 13 Million/uL      Hemoglobin 16 5 g/dL      Hematocrit 47 0 %      MCV 92 fL      MCH 32 2 pg      MCHC 35 1 g/dL      RDW 11 9 %      MPV 9 5 fL      Platelets 108 Thousands/uL      nRBC 0 /100 WBCs      Neutrophils Relative 90 %      Immat GRANS % 0 %      Lymphocytes Relative 5 %      Monocytes Relative 4 %      Eosinophils Relative 1 %      Basophils Relative 0 %      Neutrophils Absolute 9 18 Thousands/µL      Immature Grans Absolute 0 04 Thousand/uL      Lymphocytes Absolute 0 56 Thousands/µL      Monocytes Absolute 0 46 Thousand/µL      Eosinophils Absolute 0 13 Thousand/µL      Basophils Absolute 0 03 Thousands/µL     Narrative: This is an appended report  These results have been appended to a previously verified report  No orders to display              Procedures  Procedures         ED Course                               SBIRT 22yo+    Flowsheet Row Most Recent Value   SBIRT (23 yo +)    In order to provide better care to our patients, we are screening all of our patients for alcohol and drug use   Would it be okay to ask you these screening questions? No Filed at: 03/04/2023 2151                    Medical Decision Making    32 y o  male presenting for abdominal pain, vomiting and diarrhea  Vital signs stable  No rebound/guarding on exam   Suspect symptoms related to gastroenteritis  Will order CBC, CMP and lipase to evaluate for biliary disease, pancreatitis and will treat symptomatically  Reassessment: Pain and nausea resolved in the emergency department  Reviewed labs with the patient  Will defer imaging given resolution of symptoms and reassuring labs  Disposition: I have discussed with the patient our plan to discharge them from the ED and the patient is in agreement with this plan  Discharge Plan: Rx for zofran  RTED precautions emphasized  The patient was provided a written after visit summary with strict RTED precautions  Followup: I have discussed with the patient plan to follow up with their PCP  Contact information provided in AVS     Amount and/or Complexity of Data Reviewed  Labs: ordered  Risk  Prescription drug management  Disposition  Final diagnoses:   Nausea vomiting and diarrhea   Epigastric pain     Time reflects when diagnosis was documented in both MDM as applicable and the Disposition within this note     Time User Action Codes Description Comment    3/4/2023 10:44 PM Ashwini Hack Add [R11 2,  R19 7] Nausea vomiting and diarrhea     3/4/2023 10:44 PM Ashwini Hack Add [R10 13] Epigastric pain       ED Disposition     ED Disposition   Discharge    Condition   Stable    Date/Time   Sat Mar 4, 2023 10:43 PM    Comment   Jerrel Cogan  discharge to home/self care  Follow-up Information     Follow up With Specialties Details Why Contact Info    Jay Reich, DO Family Medicine Schedule an appointment as soon as possible for a visit  For reevaluation if symptoms do not resolve   [address] [facility] [phone] [facility] Discharge Medication List as of 3/4/2023 10:45 PM      START taking these medications    Details   ondansetron (ZOFRAN-ODT) 4 mg disintegrating tablet Take 1 tablet (4 mg total) by mouth every 6 (six) hours as needed for nausea or vomiting, Starting Sat 3/4/2023, Normal         CONTINUE these medications which have NOT CHANGED    Details   sertraline (ZOLOFT) 25 mg tablet Take 1 tablet (25 mg total) by mouth daily, Starting Mon 2/13/2023, Normal             No discharge procedures on file      PDMP Review       Value Time User    PDMP Reviewed  Yes 3/12/2021 12:40 PM Reggie Pugh MD          ED Provider  Electronically Signed by           Greg Jackson DO  03/05/23 8771

## 2023-03-05 NOTE — DISCHARGE INSTRUCTIONS
You have been seen for vomiting and diarrhea  Please take the prescribed zofran as needed for nausea  Return to the emergency department if you develop worsening vomiting, abdominal pain, fevers or any other symptoms of concern  Please follow up with your PCP by calling the number provided

## 2023-03-06 LAB
ATRIAL RATE: 87 BPM
P AXIS: 27 DEGREES
PR INTERVAL: 156 MS
QRS AXIS: 109 DEGREES
QRSD INTERVAL: 92 MS
QT INTERVAL: 332 MS
QTC INTERVAL: 399 MS
T WAVE AXIS: -13 DEGREES
VENTRICULAR RATE: 87 BPM

## 2023-03-23 DIAGNOSIS — F41.1 GENERALIZED ANXIETY DISORDER: ICD-10-CM

## 2023-03-23 RX ORDER — SERTRALINE HYDROCHLORIDE 25 MG/1
25 TABLET, FILM COATED ORAL DAILY
Qty: 30 TABLET | Refills: 2 | Status: SHIPPED | OUTPATIENT
Start: 2023-03-23

## 2023-05-09 DIAGNOSIS — F41.1 GENERALIZED ANXIETY DISORDER: ICD-10-CM

## 2023-05-09 RX ORDER — SERTRALINE HYDROCHLORIDE 25 MG/1
25 TABLET, FILM COATED ORAL DAILY
Qty: 30 TABLET | Refills: 0 | Status: SHIPPED | OUTPATIENT
Start: 2023-05-09

## 2023-05-22 ENCOUNTER — HOSPITAL ENCOUNTER (EMERGENCY)
Facility: HOSPITAL | Age: 28
Discharge: HOME | End: 2023-05-22
Attending: EMERGENCY MEDICINE

## 2023-05-22 ENCOUNTER — APPOINTMENT (EMERGENCY)
Dept: RADIOLOGY | Facility: HOSPITAL | Age: 28
End: 2023-05-22

## 2023-05-22 VITALS
OXYGEN SATURATION: 97 % | RESPIRATION RATE: 17 BRPM | TEMPERATURE: 97.8 F | SYSTOLIC BLOOD PRESSURE: 117 MMHG | HEART RATE: 68 BPM | DIASTOLIC BLOOD PRESSURE: 75 MMHG

## 2023-05-22 DIAGNOSIS — R07.89 RIGHT-SIDED CHEST WALL PAIN: Primary | ICD-10-CM

## 2023-05-22 RX ORDER — ACETAMINOPHEN 325 MG/1
975 TABLET ORAL ONCE
Status: COMPLETED | OUTPATIENT
Start: 2023-05-22 | End: 2023-05-22

## 2023-05-22 RX ADMIN — ACETAMINOPHEN 325MG 975 MG: 325 TABLET ORAL at 12:29

## 2023-05-22 NOTE — DISCHARGE INSTRUCTIONS
Imaging in the emergency department was unremarkable  You have been encouraged to take Tylenol and ibuprofen alternating every 3 hours as needed for pain  Please take ibuprofen with small meals to avoid upset stomach  Should you develop pain uncontrolled with medication, shortness of breath, difficulty breathing, lightheadedness, fever or any other symptoms that you find concerning please return to the emergency department immediately

## 2023-05-22 NOTE — Clinical Note
Benjy Mancia was seen and treated in our emergency department on 5/22/2023  Physical activity as tolerated    Diagnosis:     Michelle Kennedy  may return to work on return date  He may return on this date: 05/23/2023         If you have any questions or concerns, please don't hesitate to call        Abiodun Randolph, DO    ______________________________           _______________          _______________  Hospital Representative                              Date                                Time
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
## 2023-06-15 DIAGNOSIS — F41.1 GENERALIZED ANXIETY DISORDER: ICD-10-CM

## 2023-06-15 RX ORDER — SERTRALINE HYDROCHLORIDE 25 MG/1
25 TABLET, FILM COATED ORAL DAILY
Qty: 30 TABLET | Refills: 0 | Status: SHIPPED | OUTPATIENT
Start: 2023-06-15

## 2023-07-25 ENCOUNTER — OFFICE VISIT (OUTPATIENT)
Dept: URGENT CARE | Facility: MEDICAL CENTER | Age: 28
End: 2023-07-25
Payer: COMMERCIAL

## 2023-07-25 VITALS
HEART RATE: 67 BPM | BODY MASS INDEX: 30.11 KG/M2 | TEMPERATURE: 98.2 F | RESPIRATION RATE: 20 BRPM | WEIGHT: 198 LBS | OXYGEN SATURATION: 99 %

## 2023-07-25 DIAGNOSIS — J03.90 ACUTE TONSILLITIS, UNSPECIFIED ETIOLOGY: Primary | ICD-10-CM

## 2023-07-25 DIAGNOSIS — J02.9 SORE THROAT: ICD-10-CM

## 2023-07-25 LAB — S PYO AG THROAT QL: NEGATIVE

## 2023-07-25 PROCEDURE — 87070 CULTURE OTHR SPECIMN AEROBIC: CPT | Performed by: STUDENT IN AN ORGANIZED HEALTH CARE EDUCATION/TRAINING PROGRAM

## 2023-07-25 PROCEDURE — 87880 STREP A ASSAY W/OPTIC: CPT | Performed by: STUDENT IN AN ORGANIZED HEALTH CARE EDUCATION/TRAINING PROGRAM

## 2023-07-25 PROCEDURE — 99213 OFFICE O/P EST LOW 20 MIN: CPT | Performed by: STUDENT IN AN ORGANIZED HEALTH CARE EDUCATION/TRAINING PROGRAM

## 2023-07-25 RX ORDER — AMOXICILLIN 500 MG/1
500 TABLET, FILM COATED ORAL 2 TIMES DAILY
Qty: 20 TABLET | Refills: 0 | Status: CANCELLED | OUTPATIENT
Start: 2023-07-25 | End: 2023-08-04

## 2023-07-25 RX ORDER — AMOXICILLIN AND CLAVULANATE POTASSIUM 875; 125 MG/1; MG/1
1 TABLET, FILM COATED ORAL EVERY 12 HOURS SCHEDULED
Qty: 10 TABLET | Refills: 0 | Status: SHIPPED | OUTPATIENT
Start: 2023-07-25 | End: 2023-07-30

## 2023-07-25 NOTE — PROGRESS NOTES
Assessment/Plan    Acute tonsillitis, unspecified etiology [J03.90]  1. Acute tonsillitis, unspecified etiology  POCT rapid strepA    amoxicillin-clavulanate (AUGMENTIN) 875-125 mg per tablet    Throat culture      2. Sore throat  POCT rapid strepA    amoxicillin-clavulanate (AUGMENTIN) 875-125 mg per tablet    Throat culture        Patient presenting with 2 days of left-sided sore throat with absence of cough. Differential diagnosis includes strep pharyngitis, tonsillitis, peritonsillar abscess in setting of odynophagia. Rapid strep test negative, culture sent. On exam there is mild tonsillar swelling on the left side and erythema in the oropharynx but otherwise no abscess is identified or exudates. Due to patient being high risk due to history of smoking and chewing tobacco and history of tonsil infections tonsillar swelling, will treat with 5 days of Augmentin for tonsillitis which will also provide coverage for strep pharyngitis and peritonsillar abscess. Instructed patient to follow-up with PCP before the weekend in 4 days to ensure patient is progressing appropriately and as expected and there are no complications arising. Provided patient warning signs-fever, chills, dysphagia, drooling, neck pain, ear pain, headache. Subjective:     Patient ID: Jane Mendes. is a 32 y.o. male. Reason For Visit / Chief Complaint  Chief Complaint   Patient presents with   • Sore Throat         HPI  Patient presents with 2 days of left-sided sore throat, no cough. He does report odynophagia with fluids and solids but is able to swallow without aspiration. Also reports postnasal drip. Does have history of tonsil infections, unclear if pharyngitis, and ear infections. Denies cough, rhinorrhea, ear pain, eye pain or discharge, chest pain, dyspnea, neck pain, sick contacts.   He reports quitting smoking 6 months ago and is now chewing tobacco.      Past Medical History:   Diagnosis Date   • Acute appendicitis 03/12/2021   • Anxiety    • Anxiety attack     last assessed 02/18/15   • Back complaints    • Depression    • Palpitations     last assessed 03/19/15   • Psychiatric disorder    • Reactive airway disease     last assessed 02/18/15   • Syncope     last assessed 09/29/16   • Tachyarrhythmia     last assessed 03/19/15       Past Surgical History:   Procedure Laterality Date   • FRACTURE SURGERY     • HIP SURGERY Right    • [place] LAPAROSCOPIC APPENDECTOMY N/A 3/12/2021    Procedure: APPENDECTOMY LAPAROSCOPIC;  Surgeon: German Durbin MD;  Location: [facility];  Service: General       Family History   Problem Relation Age of Onset   • No Known Problems Mother    • No Known Problems Father        Review of Systems   Constitutional: Negative for chills and fever. HENT: Positive for postnasal drip, sore throat and trouble swallowing (Painful). Negative for ear pain. Eyes: Negative for pain and visual disturbance. Respiratory: Negative for cough and shortness of breath. Cardiovascular: Negative for chest pain and palpitations. Gastrointestinal: Negative for abdominal pain and vomiting. Genitourinary: Negative for dysuria and hematuria. Musculoskeletal: Negative for arthralgias and back pain. Skin: Negative for color change and rash. Neurological: Negative for seizures and syncope. All other systems reviewed and are negative. Objective:    Pulse 67   Temp 98.2 °F (36.8 °C)   Resp 20   Wt 89.8 kg (198 lb)   SpO2 99%   BMI 30.11 kg/m²     Physical Exam  Constitutional:       Appearance: Normal appearance. HENT:      Head: Normocephalic and atraumatic. Right Ear: Ear canal normal. No drainage, swelling or tenderness. A middle ear effusion is present. Tympanic membrane is not erythematous. Left Ear: Tympanic membrane and ear canal normal. No drainage, swelling or tenderness. No middle ear effusion. Tympanic membrane is not erythematous. Nose: No congestion or rhinorrhea. Mouth/Throat:      Mouth: Mucous membranes are moist. No oral lesions. Pharynx: Posterior oropharyngeal erythema present. No pharyngeal swelling, oropharyngeal exudate or uvula swelling. Tonsils: No tonsillar exudate or tonsillar abscesses. 1+ on the left. Eyes:      General: No scleral icterus. Extraocular Movements:      Right eye: Normal extraocular motion. Left eye: Normal extraocular motion. Conjunctiva/sclera: Conjunctivae normal.      Pupils: Pupils are equal, round, and reactive to light. Neck:      Thyroid: No thyromegaly. Cardiovascular:      Rate and Rhythm: Normal rate. Pulmonary:      Effort: Pulmonary effort is normal. No respiratory distress. Musculoskeletal:      Cervical back: Normal range of motion and neck supple. Lymphadenopathy:      Cervical: No cervical adenopathy. Skin:     General: Skin is warm and dry. Neurological:      General: No focal deficit present. Mental Status: He is alert and oriented to person, place, and time.    Psychiatric:         Mood and Affect: Mood normal.         Behavior: Behavior normal.

## 2023-07-25 NOTE — PATIENT INSTRUCTIONS
Tonsillitis   WHAT YOU NEED TO KNOW:   Tonsillitis is inflammation of your tonsils. Tonsils are the lumps of tissue on both sides of the back of your throat. Tonsils are part of your immune system. They help you fight infections. Recurrent tonsillitis is when you have tonsillitis many times in 1 year. Chronic tonsillitis is when you have a sore throat that lasts 3 months or longer. DISCHARGE INSTRUCTIONS:   Call 911 for the following: You have trouble breathing because your tonsils are swollen. Contact your healthcare provider if:   You have a fever. Your pain gets worse or does not get better after you take pain medicine. Your sore throat is not better after you have finished antibiotic treatment. You have trouble sleeping and wake up trying to catch your breath. You have questions or concerns about your condition or care. Medicines: You may need any of the following:  Acetaminophen  decreases pain and fever. It is available without a doctor's order. Ask how much to take and how often to take it. Follow directions. Read the labels of all other medicines you are using to see if they also contain acetaminophen, or ask your doctor or pharmacist. Acetaminophen can cause liver damage if not taken correctly. NSAIDs , such as ibuprofen, help decrease swelling, pain, and fever. This medicine is available with or without a doctor's order. NSAIDs can cause stomach bleeding or kidney problems in certain people. If you take blood thinner medicine, always ask your healthcare provider if NSAIDs are safe for you. Always read the medicine label and follow directions. Antibiotics  help treat a bacterial infection. Take your medicine as directed. Contact your healthcare provider if you think your medicine is not helping or if you have side effects. Tell your provider if you are allergic to any medicine. Keep a list of the medicines, vitamins, and herbs you take.  Include the amounts, and when and why you take them. Bring the list or the pill bottles to follow-up visits. Carry your medicine list with you in case of an emergency. Rest  when you feel it is needed. Slowly start to do more each day. Return to your daily activities as directed. Drink liquids as directed: You may need to drink more liquid than usual to help prevent dehydration. Ask how much liquid to drink each day and which liquids are best for you. Gargle with warm salt water: This may help decrease throat pain. Mix 1 teaspoon of salt in 8 ounces of warm water. Ask how often you should do this. Prevent the spread of germs:  Wash your hands often. Do not share food or drinks with anyone. You may be able to return to work when you feel better and your fever is gone for at least 24 hours. Follow up with your doctor as directed:  Write down your questions so you remember to ask them during your visits. © Copyright [employer] 2022 Information is for End User's use only and may not be sold, redistributed or otherwise used for commercial purposes. The above information is an  only. It is not intended as medical advice for individual conditions or treatments. Talk to your doctor, nurse or pharmacist before following any medical regimen to see if it is safe and effective for you. Peritonsillar Abscess   WHAT YOU NEED TO KNOW:   A peritonsillar abscess, or PTA, is a collection of pus in the peritonsillar space. The peritonsillar space is the area between your tonsil and the back wall of your throat. It is near the opening of the tubes leading to your stomach and lungs. DISCHARGE INSTRUCTIONS:   Call your local emergency number ([phone] in the [address]) if:   You have trouble breathing. Seek care immediately if:   You have more pain, swelling, or redness in your throat. Your symptoms get worse or do not get better, even with treatment. You have difficulty or pain when you swallow, or you cannot eat or drink.     Call your doctor if:   Your abscess returns. You have questions or concerns about your condition or care. Medicines:   Antibiotics  help treat or prevent a bacterial infection. Acetaminophen  decreases pain and fever. It is available without a doctor's order. Ask how much to take and how often to take it. Follow directions. Acetaminophen can cause liver damage if not taken correctly. Steroids  decrease swelling. NSAIDs , such as ibuprofen, help decrease swelling, pain, and fever. This medicine is available with or without a doctor's order. NSAIDs can cause stomach bleeding or kidney problems in certain people. If you take blood thinner medicine, always ask if NSAIDs are safe for you. Always read the medicine label and follow directions. Do not give these medicines to children younger than 6 months without direction from a healthcare provider. Take your medicine as directed. Contact your healthcare provider if you think your medicine is not helping or if you have side effects. Tell your provider if you are allergic to any medicine. Keep a list of the medicines, vitamins, and herbs you take. Include the amounts, and when and why you take them. Bring the list or the pill bottles to follow-up visits. Carry your medicine list with you in case of an emergency. Manage your symptoms:   A liquid diet  may decrease your discomfort until the PTA is healed. A liquid diet may include jello, juices, or ice pops. Follow up with your doctor as directed:  Write down your questions so you can remember to ask them during your visits. © Copyright [employer] 2022 Information is for End User's use only and may not be sold, redistributed or otherwise used for commercial purposes. The above information is an  only. It is not intended as medical advice for individual conditions or treatments.  Talk to your doctor, nurse or pharmacist before following any medical regimen to see if it is safe and effective for you.

## 2023-07-27 LAB — BACTERIA THROAT CULT: NORMAL

## 2023-07-31 DIAGNOSIS — F41.1 GENERALIZED ANXIETY DISORDER: ICD-10-CM

## 2023-07-31 RX ORDER — SERTRALINE HYDROCHLORIDE 25 MG/1
25 TABLET, FILM COATED ORAL DAILY
Qty: 30 TABLET | Refills: 0 | Status: SHIPPED | OUTPATIENT
Start: 2023-07-31

## 2023-09-05 ENCOUNTER — OFFICE VISIT (OUTPATIENT)
Dept: URGENT CARE | Facility: MEDICAL CENTER | Age: 28
End: 2023-09-05
Payer: COMMERCIAL

## 2023-09-05 VITALS
HEART RATE: 69 BPM | RESPIRATION RATE: 20 BRPM | OXYGEN SATURATION: 98 % | WEIGHT: 210 LBS | BODY MASS INDEX: 31.83 KG/M2 | HEIGHT: 68 IN | TEMPERATURE: 98.1 F

## 2023-09-05 DIAGNOSIS — R11.11 VOMITING WITHOUT NAUSEA, UNSPECIFIED VOMITING TYPE: Primary | ICD-10-CM

## 2023-09-05 PROCEDURE — 99212 OFFICE O/P EST SF 10 MIN: CPT | Performed by: PHYSICIAN ASSISTANT

## 2023-09-05 RX ORDER — ONDANSETRON 4 MG/1
4 TABLET, ORALLY DISINTEGRATING ORAL EVERY 6 HOURS PRN
Qty: 20 TABLET | Refills: 0 | Status: SHIPPED | OUTPATIENT
Start: 2023-09-05

## 2023-09-05 NOTE — PATIENT INSTRUCTIONS
No dairy until feeling better  Gradually increase diet as tolerated  Zofran if needed for nausea/vomiting  If symptoms worsen have yourself rechecked

## 2023-09-05 NOTE — PROGRESS NOTES
[facility]        NAME: Gloria Guzman. is a 32 y.o. male  : 1995    MRN: [id number]  DATE: 2023  TIME: 11:27 AM    Assessment and Plan   Vomiting without nausea, unspecified vomiting type [R11.11]  1. Vomiting without nausea, unspecified vomiting type  ondansetron (ZOFRAN-ODT) 4 mg disintegrating tablet            Patient Instructions       Follow up with PCP in 3-5 days. Proceed to  ER if symptoms worsen. Chief Complaint     Chief Complaint   Patient presents with   • Vomiting     Pt. Vomited 3 times today since waking up, no other symptoms, needs work note          History of Present Illness       Presents with 3 episodes of nausea and vomiting this morning upon awakening. He went to work was sent home due to his symptoms. He tried to eat a bowl of cereal and vomited. Has occasional abdominal discomfort no fever or chills. Review of Systems   Review of Systems   Constitutional: Negative for fever. Gastrointestinal: Positive for abdominal pain, nausea and vomiting. Negative for diarrhea.          Current Medications       Current Outpatient Medications:   •  ondansetron (ZOFRAN-ODT) 4 mg disintegrating tablet, Take 1 tablet (4 mg total) by mouth every 6 (six) hours as needed for nausea or vomiting, Disp: 20 tablet, Rfl: 0  •  sertraline (ZOLOFT) 25 mg tablet, Take 1 tablet (25 mg total) by mouth daily, Disp: 30 tablet, Rfl: 0    Current Allergies     Allergies as of 2023   • (No Known Allergies)            The following portions of the patient's history were reviewed and updated as appropriate: allergies, current medications, past family history, past medical history, past social history, past surgical history and problem list.     Past Medical History:   Diagnosis Date   • Acute appendicitis 2021   • Anxiety    • Anxiety attack     last assessed 02/18/15   • Back complaints    • Depression    • Palpitations     last assessed 03/19/15   • Psychiatric disorder    • Reactive airway disease     last assessed 02/18/15   • Syncope     last assessed 09/29/16   • Tachyarrhythmia     last assessed 03/19/15       Past Surgical History:   Procedure Laterality Date   • FRACTURE SURGERY     • HIP SURGERY Right    • [place] LAPAROSCOPIC APPENDECTOMY N/A 3/12/2021    Procedure: APPENDECTOMY LAPAROSCOPIC;  Surgeon: Phineas Denver, MD;  Location: [facility];  Service: General       Family History   Problem Relation Age of Onset   • No Known Problems Mother    • No Known Problems Father          Medications have been verified. Objective   Pulse 69   Temp 98.1 °F (36.7 °C)   Resp 20   Ht 5' 8" (1.727 m)   Wt 95.3 kg (210 lb)   SpO2 98%   BMI 31.93 kg/m²   No LMP for male patient. Physical Exam     Physical Exam  Vitals and nursing note reviewed. Constitutional:       Appearance: Normal appearance. HENT:      Head: Normocephalic and atraumatic. Cardiovascular:      Rate and Rhythm: Normal rate and regular rhythm. Pulmonary:      Effort: Pulmonary effort is normal.   Abdominal:      Tenderness: There is no abdominal tenderness. There is no guarding or rebound. Skin:     General: Skin is warm. Neurological:      Mental Status: He is alert.

## 2023-09-05 NOTE — LETTER
September 5, 2023     Patient: Lin Resendez. YOB: 1995   Date of Visit: 9/5/2023       To Whom It May Concern:    Rika Price was seen for nausea and vomiting on 09/05/23 at 10:00 AM and may return to work  without restrictions 09/06/23. If you have any questions or concerns, please don't hesitate to call.          Sincerely,        Radha King PA-C    CC: No Recipients

## 2023-09-22 DIAGNOSIS — F41.1 GENERALIZED ANXIETY DISORDER: ICD-10-CM

## 2023-09-22 RX ORDER — SERTRALINE HYDROCHLORIDE 25 MG/1
25 TABLET, FILM COATED ORAL DAILY
Qty: 30 TABLET | Refills: 0 | Status: SHIPPED | OUTPATIENT
Start: 2023-09-22

## 2023-11-28 ENCOUNTER — OFFICE VISIT (OUTPATIENT)
Dept: FAMILY MEDICINE CLINIC | Facility: CLINIC | Age: 28
End: 2023-11-28
Payer: COMMERCIAL

## 2023-11-28 VITALS
WEIGHT: 212.2 LBS | OXYGEN SATURATION: 99 % | SYSTOLIC BLOOD PRESSURE: 106 MMHG | HEART RATE: 87 BPM | HEIGHT: 68 IN | DIASTOLIC BLOOD PRESSURE: 62 MMHG | BODY MASS INDEX: 32.16 KG/M2

## 2023-11-28 DIAGNOSIS — F41.1 GENERALIZED ANXIETY DISORDER: ICD-10-CM

## 2023-11-28 PROCEDURE — 99214 OFFICE O/P EST MOD 30 MIN: CPT | Performed by: PHYSICIAN ASSISTANT

## 2023-11-28 NOTE — PROGRESS NOTES
Name: Joyce Kapoor. : 1995      MRN: [id number]  Encounter Provider: Stacey Libman, PA-C  Encounter Date: 2023   Encounter department: [address]     1. Generalized anxiety disorder  Assessment & Plan:  Increase Zoloft from 25 mg to 50 mg daily. Return in 1 month or sooner if needed    Orders:  -     sertraline (ZOLOFT) 50 mg tablet; Take 1 tablet (50 mg total) by mouth daily        Depression Screening and Follow-up Plan: Patient was screened for depression during today's encounter. They screened negative with a PHQ-2 score of 0. Julia Centeno is a pleasant 47-year-old male who is here today for follow-up for anxiety. He admits that he has been out of his Zoloft for the past 3 weeks. He does not feel significantly different on the 25 mg. He admits that his anxiety has been okay. He is interested in trying a higher dose to see if this helps with his anxiety. He denies any recent panic attacks. He denies any suicidal or homicidal thoughts. Review of Systems   Constitutional:  Negative for chills, diaphoresis, fatigue and fever. HENT:  Negative for congestion, ear pain, postnasal drip, rhinorrhea, sneezing, sore throat and trouble swallowing. Eyes:  Negative for pain and visual disturbance. Respiratory:  Negative for apnea, cough, shortness of breath and wheezing. Cardiovascular:  Negative for chest pain and palpitations. Gastrointestinal:  Negative for abdominal pain, constipation, diarrhea, nausea and vomiting. Genitourinary:  Negative for dysuria and hematuria. Musculoskeletal:  Negative for arthralgias, gait problem and myalgias. Neurological:  Negative for dizziness, syncope, weakness, light-headedness, numbness and headaches. Psychiatric/Behavioral:  Negative for suicidal ideas. The patient is nervous/anxious.         Current Outpatient Medications on File Prior to Visit   Medication Sig   • [DISCONTINUED] sertraline (ZOLOFT) 25 mg tablet Take 1 tablet (25 mg total) by mouth daily   • ondansetron (ZOFRAN-ODT) 4 mg disintegrating tablet Take 1 tablet (4 mg total) by mouth every 6 (six) hours as needed for nausea or vomiting (Patient not taking: Reported on 11/28/2023)       Objective     /62   Pulse 87   Ht 5' 8" (1.727 m)   Wt 96.3 kg (212 lb 3.2 oz)   SpO2 99%   BMI 32.26 kg/m²     Physical Exam  Vitals and nursing note reviewed. Constitutional:       Appearance: He is well-developed. HENT:      Head: Normocephalic and atraumatic. Right Ear: External ear normal.      Left Ear: External ear normal.      Nose: Nose normal.   Eyes:      Extraocular Movements: Extraocular movements intact. Cardiovascular:      Rate and Rhythm: Normal rate and regular rhythm. Heart sounds: Normal heart sounds. No murmur heard. No friction rub. No gallop. Pulmonary:      Effort: Pulmonary effort is normal. No respiratory distress. Breath sounds: Normal breath sounds. No wheezing or rales. Abdominal:      General: Bowel sounds are normal.   Musculoskeletal:         General: Normal range of motion. Cervical back: Normal range of motion and neck supple. Skin:     General: Skin is warm and dry. Neurological:      Mental Status: He is alert and oriented to person, place, and time. Psychiatric:         Mood and Affect: Mood is anxious. Mood is not depressed. Behavior: Behavior normal.         Thought Content: Thought content normal. Thought content does not include homicidal or suicidal ideation. Thought content does not include homicidal or suicidal plan.          Judgment: Judgment normal.       Jenae Ho PA-C

## 2024-01-08 DIAGNOSIS — F41.1 GENERALIZED ANXIETY DISORDER: ICD-10-CM

## 2024-02-04 ENCOUNTER — APPOINTMENT (EMERGENCY)
Dept: RADIOLOGY | Facility: HOSPITAL | Age: 29
End: 2024-02-04
Payer: COMMERCIAL

## 2024-02-04 ENCOUNTER — HOSPITAL ENCOUNTER (EMERGENCY)
Facility: HOSPITAL | Age: 29
Discharge: HOME | End: 2024-02-04
Attending: EMERGENCY MEDICINE
Payer: COMMERCIAL

## 2024-02-04 VITALS
DIASTOLIC BLOOD PRESSURE: 70 MMHG | WEIGHT: 215 LBS | TEMPERATURE: 97.1 F | HEART RATE: 99 BPM | RESPIRATION RATE: 15 BRPM | BODY MASS INDEX: 32.58 KG/M2 | SYSTOLIC BLOOD PRESSURE: 111 MMHG | OXYGEN SATURATION: 96 % | HEIGHT: 68 IN

## 2024-02-04 DIAGNOSIS — S60.10XA SUBUNGUAL HEMATOMA OF FINGER OF LEFT HAND, INITIAL ENCOUNTER: Primary | ICD-10-CM

## 2024-02-04 DIAGNOSIS — S62.522A: ICD-10-CM

## 2024-02-04 PROCEDURE — 99283 EMERGENCY DEPT VISIT LOW MDM: CPT

## 2024-02-04 PROCEDURE — 73140 X-RAY EXAM OF FINGER(S): CPT

## 2024-02-04 PROCEDURE — 11740 EVACUATION SUBUNGUAL HMTMA: CPT | Performed by: EMERGENCY MEDICINE

## 2024-02-04 PROCEDURE — 99284 EMERGENCY DEPT VISIT MOD MDM: CPT | Performed by: EMERGENCY MEDICINE

## 2024-02-04 NOTE — ED PROVIDER NOTES
History  Chief Complaint   Patient presents with    Thumb Injury     Patient reports that he slammed his left 1st digit in car door last night     HPI  28-year-old male presenting for injury to left thumb.  Patient reports yesterday he had a car door accidentally slammed on the tip of his left thumb.  He noted pain and has developed a discoloration underneath the nail consistent with a hematoma.  Denies any bleeding.  Able to move at the MCP and IP joints no history of prior injury or surgery.  Prior to Admission Medications   Prescriptions Last Dose Informant Patient Reported? Taking?   ondansetron (ZOFRAN-ODT) 4 mg disintegrating tablet   No No   Sig: Take 1 tablet (4 mg total) by mouth every 6 (six) hours as needed for nausea or vomiting   Patient not taking: Reported on 11/28/2023   sertraline (ZOLOFT) 50 mg tablet 2/4/2024  No Yes   Sig: Take 1 tablet (50 mg total) by mouth daily      Facility-Administered Medications: None       Past Medical History:   Diagnosis Date    Acute appendicitis 03/12/2021    Anxiety     Anxiety attack     last assessed 02/18/15    Back complaints     Depression     Palpitations     last assessed 03/19/15    Psychiatric disorder     Reactive airway disease     last assessed 02/18/15    Syncope     last assessed 09/29/16    Tachyarrhythmia     last assessed 03/19/15       Past Surgical History:   Procedure Laterality Date    FRACTURE SURGERY      HIP SURGERY Right     [place] LAPAROSCOPIC APPENDECTOMY N/A 3/12/2021    Procedure: APPENDECTOMY LAPAROSCOPIC;  Surgeon: Roger Ashford MD;  Location: [facility];  Service: General       Family History   Problem Relation Age of Onset    No Known Problems Mother     No Known Problems Father      I have reviewed and agree with the history as documented.    E-Cigarette/Vaping    E-Cigarette Use Never User      E-Cigarette/Vaping Substances    Nicotine No     THC No     CBD No     Flavoring No     Other No     Unknown No      Social History      Tobacco Use    Smoking status: Some Days     Current packs/day: 0.00     Types: Cigarettes     Start date: 2007     Last attempt to quit: 2022     Years since quittin.9    Smokeless tobacco: Current     Types: Chew   Vaping Use    Vaping status: Never Used   Substance Use Topics    Alcohol use: Not Currently     Comment: WEEKENDS- social    Drug use: Never       Review of Systems   Constitutional:  Negative for chills and fever.   HENT:  Negative for ear pain and sore throat.    Eyes:  Negative for pain and visual disturbance.   Respiratory:  Negative for cough and shortness of breath.    Cardiovascular:  Negative for chest pain and palpitations.   Gastrointestinal:  Negative for abdominal pain and vomiting.   Genitourinary:  Negative for dysuria and hematuria.   Musculoskeletal:  Negative for arthralgias and back pain.        Left thumb pain, left thumb bruising   Skin:  Negative for color change and rash.   Neurological:  Negative for seizures and syncope.   All other systems reviewed and are negative.      Physical Exam  Physical Exam  Vitals and nursing note reviewed.   Constitutional:       General: He is not in acute distress.     Appearance: He is well-developed.   HENT:      Head: Normocephalic and atraumatic.   Eyes:      Conjunctiva/sclera: Conjunctivae normal.   Cardiovascular:      Rate and Rhythm: Normal rate and regular rhythm.      Heart sounds: No murmur heard.  Pulmonary:      Effort: Pulmonary effort is normal. No respiratory distress.      Breath sounds: Normal breath sounds.   Abdominal:      Palpations: Abdomen is soft.      Tenderness: There is no abdominal tenderness.   Musculoskeletal:         General: No swelling.      Cervical back: Neck supple.      Comments: There is a subungual hematoma beneath the nail of the left thumb.  Range of motion at the IP and MCP joints are preserved.  No deformity noted.  No skin injury noted.   Skin:     General: Skin is warm and dry.       Capillary Refill: Capillary refill takes less than 2 seconds.   Neurological:      Mental Status: He is alert.   Psychiatric:         Mood and Affect: Mood normal.         Vital Signs  ED Triage Vitals [02/04/24 1032]   Temperature Pulse Respirations Blood Pressure SpO2   (!) 97.1 °F (36.2 °C) 99 15 111/70 96 %      Temp Source Heart Rate Source Patient Position - Orthostatic VS BP Location FiO2 (%)   Temporal Monitor Sitting Left arm --      Pain Score       5           Vitals:    02/04/24 1032   BP: 111/70   Pulse: 99   Patient Position - Orthostatic VS: Sitting         Visual Acuity      ED Medications  Medications - No data to display    Diagnostic Studies  Results Reviewed       None                   XR thumb first digit-min 2 views LEFT   ED Interpretation by Allen Polanco DO (02/04 1105)   Abnormal   2 view x-ray of the left first digit interpreted by myself pending official radiology report.  Questionable closed nondisplaced fracture of the distal phalanx.                 Procedures  Incision and drain    Date/Time: 2/4/2024 10:46 AM    Performed by: Allen Polanco DO  Authorized by: Allen Polanco DO  Universal Protocol:  Consent: Verbal consent obtained.  Consent given by: patient  Patient identity confirmed: verbally with patient    Patient location:  ED  Location:     Type:  Subungual hematoma    Location:  Upper extremity    Upper extremity location:  L thumb  Anesthesia (see MAR for exact dosages):     Anesthesia method:  None  Procedure details:     Complexity:  Simple    Needle aspiration: no      Incision types:  Other (comment) (Electrocautery trephination)    Approach:  Puncture    Incision depth:  Subungual    Drainage:  Bloody    Drainage amount:  Moderate  Post-procedure details:     Patient tolerance of procedure:  Tolerated well, no immediate complications  Comments:      Electrocautery to 1 hot placed upon the central nail with immediate drainage of dark blood with  improvement in the visualized hematoma and return of a normal color of the subungual region.  Dressed with bulky gauze dressing.           ED Course                               SBIRT 22yo+      Flowsheet Row Most Recent Value   Initial Alcohol Screen: US AUDIT-C     1. How often do you have a drink containing alcohol? 0 Filed at: 02/04/2024 1033   2. How many drinks containing alcohol do you have on a typical day you are drinking?  0 Filed at: 02/04/2024 1033   3a. Male UNDER 65: How often do you have five or more drinks on one occasion? 0 Filed at: 02/04/2024 1033   3b. FEMALE Any Age, or MALE 65+: How often do you have 4 or more drinks on one occassion? 0 Filed at: 02/04/2024 1033   Audit-C Score 0 Filed at: 02/04/2024 1033   MARICARMEN: How many times in the past year have you...    Used an illegal drug or used a prescription medication for non-medical reasons? Never Filed at: 02/04/2024 1033                      Medical Decision Making  Problems Addressed:  Subungual hematoma of finger of left hand, initial encounter: acute illness or injury    Amount and/or Complexity of Data Reviewed  Radiology: ordered and independent interpretation performed.             Disposition  Final diagnoses:   Subungual hematoma of finger of left hand, initial encounter   Fracture of distal phalanx of left thumb - possible closed nondisplaced fracture, conservative management      Time reflects when diagnosis was documented in both MDM as applicable and the Disposition within this note       Time User Action Codes Description Comment    2/4/2024 10:54 AM Allen Polanco Add [S60.10XA] Subungual hematoma of finger of left hand, initial encounter     2/4/2024 11:03 AM Allen Polanco Add [S62.522A] Fracture of distal phalanx of left thumb     2/4/2024 11:03 AM Allen Polanco Modify [S62.522A] Fracture of distal phalanx of left thumb possible closed nondisplaced fracture, conservative management           ED Disposition        ED Disposition   Discharge    Condition   Stable    Date/Time   Sun Feb 4, 2024 1104    Comment   Dakota Franco Jr. discharge to home/self care.                   Follow-up Information       Follow up With Specialties Details Why Contact Info Additional Information    [facility] Schedule an appointment as soon as possible for a visit  As needed, If symptoms worsen [address] [facility] [id number] [phone] [facility] [address], [place], [address] [phone]    Ramone Martinez DO Family Medicine Schedule an appointment as soon as possible for a visit  As needed, If symptoms worsen [address] [facility] [address]  [phone]       [facility] Emergency Department Emergency Medicine Go to  If symptoms worsen [address] [facility] [address]  [phone] [facility] Emergency Department, [address], [place], [address]            Patient's Medications   Discharge Prescriptions    No medications on file       No discharge procedures on file.    PDMP Review         Value Time User    PDMP Reviewed  Yes 3/12/2021 12:40 PM Roger Ashford MD            ED Provider  Electronically Signed by             Allen Polanco DO  02/04/24 1105       Allen Polanco DO  02/04/24 1105

## 2024-02-04 NOTE — Clinical Note
Dakota Franco was seen and treated in our emergency department on 2/4/2024.                Diagnosis:     Dakota  .    He may return on this date:     Please excuse for absence from work 2/4/2024, 2/5/2024, 2/6/2024.  If symptoms persist, no use of left thumb while working until reevaluated by family doctor/orthopedic/hand surgery.     If you have any questions or concerns, please don't hesitate to call.      Allen Polanco, DO    ______________________________           _______________          _______________  Hospital Representative                              Date                                Time

## 2024-02-06 ENCOUNTER — OFFICE VISIT (OUTPATIENT)
Dept: FAMILY MEDICINE CLINIC | Facility: CLINIC | Age: 29
End: 2024-02-06
Payer: COMMERCIAL

## 2024-02-06 VITALS
HEIGHT: 68 IN | DIASTOLIC BLOOD PRESSURE: 80 MMHG | BODY MASS INDEX: 34.31 KG/M2 | WEIGHT: 226.4 LBS | SYSTOLIC BLOOD PRESSURE: 124 MMHG | HEART RATE: 95 BPM | OXYGEN SATURATION: 99 %

## 2024-02-06 DIAGNOSIS — S62.525D CLOSED NONDISPLACED FRACTURE OF DISTAL PHALANX OF LEFT THUMB WITH ROUTINE HEALING, SUBSEQUENT ENCOUNTER: Primary | ICD-10-CM

## 2024-02-06 DIAGNOSIS — S60.10XD SUBUNGUAL HEMATOMA OF FINGER OF LEFT HAND, SUBSEQUENT ENCOUNTER: ICD-10-CM

## 2024-02-06 PROBLEM — S60.10XA SUBUNGUAL HEMATOMA OF FINGER OF LEFT HAND: Status: ACTIVE | Noted: 2024-02-06

## 2024-02-06 PROCEDURE — 99214 OFFICE O/P EST MOD 30 MIN: CPT | Performed by: PHYSICIAN ASSISTANT

## 2024-02-06 NOTE — LETTER
February 6, 2024     Patient: Dakota Franco Jr.  YOB: 1995  Date of Visit: 2/6/2024      To Whom it May Concern:    Dakota Franco is under my professional care. Dakota was seen in my office on 2/6/2024. He should be excused from work 2/5/2024 and 2/6/2024. Dakota may return to work on 2/7/2024 with no restrictions .    If you have any questions or concerns, please don't hesitate to call.         Sincerely,          Torie Abbott PA-C

## 2024-02-06 NOTE — ASSESSMENT & PLAN NOTE
Recommended that he continue to splint distal tip of finger to avoid trauma.  Will repeat x-ray in 4 to 6 weeks to ensure healing and stability.  Pain is stable.  He may take Tylenol or Motrin as needed.  He will call with any concerns.

## 2024-02-06 NOTE — PROGRESS NOTES
Name: Dakota Franco Jr.      : 1995      MRN: [id number]  Encounter Provider: Torie Abbott PA-C  Encounter Date: 2024   Encounter department: [facility]    Assessment & Plan     1. Closed nondisplaced fracture of distal phalanx of left thumb with routine healing, subsequent encounter  Assessment & Plan:  Recommended that he continue to splint distal tip of finger to avoid trauma.  Will repeat x-ray in 4 to 6 weeks to ensure healing and stability.  Pain is stable.  He may take Tylenol or Motrin as needed.  He will call with any concerns.    Orders:  -     XR hand 3+ vw left; Future; Expected date: 2024    2. Subungual hematoma of finger of left hand, subsequent encounter  Assessment & Plan:  Improved significantly after I&D performed in the emergency department.             Tray Duncan is a very pleasant 28-year-old male who is here today for a follow-up from the emergency department.  He was evaluated in the emergency department on 2024 after slamming his left thumb in his car door.  He was evaluated at the emergency department where they completed an x-ray that showed a hairline fracture of the distal tip of his left thumb.  He also had an I&D for a subungual hematoma.  He admits that the pain has significantly improved since the I&D.  He denies any numbness, tingling, or color change to the tip of his finger.  He has been wearing a splint.      Review of Systems   Constitutional:  Negative for chills, diaphoresis, fatigue and fever.   HENT:  Negative for congestion, ear pain, postnasal drip, rhinorrhea, sneezing, sore throat and trouble swallowing.    Eyes:  Negative for pain and visual disturbance.   Respiratory:  Negative for apnea, cough, shortness of breath and wheezing.    Cardiovascular:  Negative for chest pain and palpitations.   Gastrointestinal:  Negative for abdominal pain, constipation, diarrhea, nausea and vomiting.   Genitourinary:  Negative for dysuria  "and hematuria.   Musculoskeletal:  Positive for arthralgias (Left thumb pain). Negative for gait problem and myalgias.   Neurological:  Negative for dizziness, syncope, weakness, light-headedness, numbness and headaches.   Psychiatric/Behavioral:  Negative for suicidal ideas. The patient is not nervous/anxious.        Current Outpatient Medications on File Prior to Visit   Medication Sig   • sertraline (ZOLOFT) 50 mg tablet Take 1 tablet (50 mg total) by mouth daily   • ondansetron (ZOFRAN-ODT) 4 mg disintegrating tablet Take 1 tablet (4 mg total) by mouth every 6 (six) hours as needed for nausea or vomiting (Patient not taking: Reported on 11/28/2023)       Objective     /80   Pulse 95   Ht 5' 8\" (1.727 m)   Wt 103 kg (226 lb 6.4 oz)   SpO2 99%   BMI 34.42 kg/m²     Physical Exam  Vitals and nursing note reviewed.   Constitutional:       Appearance: He is well-developed.   HENT:      Head: Normocephalic and atraumatic.      Right Ear: External ear normal.      Left Ear: External ear normal.      Nose: Nose normal.      Mouth/Throat:      Pharynx: No oropharyngeal exudate or posterior oropharyngeal erythema.   Eyes:      Extraocular Movements: Extraocular movements intact.   Cardiovascular:      Rate and Rhythm: Normal rate and regular rhythm.      Heart sounds: Normal heart sounds. No murmur heard.     No friction rub. No gallop.   Pulmonary:      Effort: Pulmonary effort is normal. No respiratory distress.      Breath sounds: Normal breath sounds. No wheezing or rales.   Musculoskeletal:         General: Normal range of motion.      Left hand: Tenderness present. No swelling. Normal range of motion.      Cervical back: Normal range of motion and neck supple.      Comments: Mild tenderness to palpation of distal tip of left thumb.  Improvement of subungual hematoma.  Mild bruising under the nail.  Full range of motion of left thumb.  Neurovascularly intact.   Skin:     General: Skin is warm and dry. "   Neurological:      Mental Status: He is alert and oriented to person, place, and time.   Psychiatric:         Behavior: Behavior normal.         Thought Content: Thought content normal.         Judgment: Judgment normal.       Torie Abbott PA-C

## 2024-02-26 DIAGNOSIS — F41.1 GENERALIZED ANXIETY DISORDER: ICD-10-CM

## 2024-03-08 ENCOUNTER — APPOINTMENT (OUTPATIENT)
Dept: URGENT CARE | Facility: CLINIC | Age: 29
End: 2024-03-08

## 2024-05-06 DIAGNOSIS — F41.1 GENERALIZED ANXIETY DISORDER: ICD-10-CM

## 2024-05-06 NOTE — TELEPHONE ENCOUNTER
Medication: Sertraline     Dose/Frequency: 50 mg tab once daily     Quantity: 30    Pharmacy: [id number]    Office:   [x] PCP/Provider - Dr. Martinez   [] Speciality/Provider -     Does the patient have enough for 3 days?   [x] Yes   [] No - Send as HP to POD

## 2024-06-20 ENCOUNTER — OFFICE VISIT (OUTPATIENT)
Dept: NEUROSURGERY | Facility: CLINIC | Age: 29
End: 2024-06-20
Payer: COMMERCIAL

## 2024-06-20 VITALS
TEMPERATURE: 97.6 F | BODY MASS INDEX: 34.56 KG/M2 | RESPIRATION RATE: 20 BRPM | WEIGHT: 228 LBS | HEIGHT: 68 IN | OXYGEN SATURATION: 98 % | HEART RATE: 78 BPM | SYSTOLIC BLOOD PRESSURE: 130 MMHG | DIASTOLIC BLOOD PRESSURE: 90 MMHG

## 2024-06-20 DIAGNOSIS — Z82.49 FAMILY HISTORY OF INTRACRANIAL ANEURYSMS: Primary | ICD-10-CM

## 2024-06-20 DIAGNOSIS — M62.511 ATROPHY OF MUSCLE OF RIGHT SHOULDER: ICD-10-CM

## 2024-06-20 DIAGNOSIS — I72.9 ANEURYSM (HCC): ICD-10-CM

## 2024-06-20 PROBLEM — M62.519: Status: ACTIVE | Noted: 2024-06-20

## 2024-06-20 PROCEDURE — 99202 OFFICE O/P NEW SF 15 MIN: CPT | Performed by: NURSE PRACTITIONER

## 2024-06-20 NOTE — ASSESSMENT & PLAN NOTE
Reports muscle loss and weakness in the left shoulder girdle  status post herniated cervical disc C6 C7  with described symptoms of cervical radiculopathy and paresthesia sensation right upper extremity .  Reports after several weeks the disc resolved but  since has the weakness in the righ shoulder, girdle , and muscle in right upper back and chest.  Repots therapy was expensive $50.00 co pay only attended a few sessions . Denies receiving HEP.       Plan   Recommend he discuss with PCP  for order to return to therapy for brief time 1-2 visits  to receive HEP for upper trunk and cervical paraspinal; and core strengthening exercises.   Cervical/core and  paraspinal exercises provided.

## 2024-06-20 NOTE — PROGRESS NOTES
Assessment/Plan:    Atrophy of muscle of shoulder  Reports muscle loss and weakness in the left shoulder girdle  status post herniated cervical disc C6 C7  with described symptoms of cervical radiculopathy and paresthesia sensation right upper extremity .  Reports after several weeks the disc resolved but  since has the weakness in the righ shoulder, girdle , and muscle in right upper back and chest.  Repots therapy was expensive $50.00 co pay only attended a few sessions . Denies receiving HEP.       Plan   Recommend he discuss with PCP  for order to return to therapy for brief time 1-2 visits  to receive HEP for upper trunk and cervical paraspinal; and core strengthening exercises.   Cervical/core and  paraspinal exercises provided.     Family history of intracranial aneurysms  Addressed in HPI, strong hand family history of intracranial aneurysm.  Patient also describes a history of vertebral artery dissection and father and severe stenosis.  Aneurysm risk factors  HTN-no diagnosis, BP today 130/90.  HLD-no documented history  Nicotine use-3 to 4 cigarettes/day.      Plan  Ordered MRI angio of the brain without.  Patient will return to office as a follow-up joint appointment with Dr. Chang and I 3 days to 1 week after MRI completion.  Advised patient if imaging does not demonstrate an aneurysm or an aneurysm is of a nonsurgical size the follow-up appointment will be solo with me.  Reviewed in great detail aneurysm risk factors.  Advised of the need for ongoing assessment by PCP to ensure he is not diagnosed with risk factors and/or are appropriately managed.  Provided a broad overview research and aneurysm risk for rupture over a 1 to 5-year timeframe.  Reviewed with patient signs and symptoms of aneurysm rupture, and documenting in AVS  Advised patient if he has additional questions or concerns he should notify the neurosurgery office.  Discussed in detail healthy living to decrease risk for developing risk  factors for aneurysm rupture as identified above, HTN and HLD.  Discussed in detail need for nicotine cessation.          Subjective: May father had and clot in an artery in his neck now it is blocked and I have relatives that have has strokes and bleed in their head,   Patient ID: Dakota Franco Jr. is a 28 y.o. male     HPI   Patient is referred to neurosurgery after father recently completed an office visit for aneurysm and vertebral artery dissection, family history reported 2 or more first-degree relatives with aneurysms.  As per provider documentation family members include 2 children- 1 boy and 1 girl    6 brothers and 1 sister   He Presents in neurosurgery initial consult OV for aneurysm surveillance , has 2 first degree relatives with known ruptured aneurysms and stroke and aunt and unncle. He reports his father recently has a clot in and artery in his neck and now it is blocked off.    Uncle age 61  ruptured aneurysm with stroke, survived and underwent craniotomy surgical intervention.    He reports recently his aunt in 2024 age 72  entered [facility]pital as stroke discovered she had an aneurysm bleed and underwent repair, unknown procedure. Father vertebral artery aneyrysm  and recent dissection of vertebral artery cervical segment .  His father remains under neurosurgical care and has not undergone surgical intervention.  Patient presents today for first neurosurgical self-referral visit to begin aneurysm surveillance due to family history of aneurysm.      REVIEW OF SYSTEMS  Review of Systems   Constitutional: Negative.    HENT: Negative.     Eyes: Negative.    Respiratory: Negative.     Cardiovascular: Negative.    Gastrointestinal: Negative.    Endocrine: Negative.    Genitourinary: Negative.    Musculoskeletal: Negative.    Skin: Negative.    Allergic/Immunologic: Negative.    Neurological: Negative.    Hematological: Negative.    Psychiatric/Behavioral: Negative.           Meds/Allergies     Current  Outpatient Medications   Medication Sig Dispense Refill    sertraline (ZOLOFT) 50 mg tablet Take 1 tablet (50 mg total) by mouth daily 30 tablet 5    ondansetron (ZOFRAN-ODT) 4 mg disintegrating tablet Take 1 tablet (4 mg total) by mouth every 6 (six) hours as needed for nausea or vomiting (Patient not taking: Reported on 2023) 20 tablet 0     No current facility-administered medications for this visit.       No Known Allergies    PAST HISTORY    Past Medical History:   Diagnosis Date    Acute appendicitis 2021    Anxiety     Anxiety attack     last assessed 02/18/15    Back complaints     Depression     Palpitations     last assessed 03/19/15    Psychiatric disorder     Reactive airway disease     last assessed 02/18/15    Syncope     last assessed 16    Tachyarrhythmia     last assessed 03/19/15       Past Surgical History:   Procedure Laterality Date    FRACTURE SURGERY      HIP SURGERY Right     MS LAPAROSCOPIC APPENDECTOMY N/A 3/12/2021    Procedure: APPENDECTOMY LAPAROSCOPIC;  Surgeon: Roger Ashford MD;  Location: [facility];  Service: General       Social History     Tobacco Use    Smoking status: Some Days     Current packs/day: 0.00     Types: Cigarettes     Start date: 2007     Last attempt to quit: 2022     Years since quittin.3    Smokeless tobacco: Current     Types: Chew   Vaping Use    Vaping status: Never Used   Substance Use Topics    Alcohol use: Not Currently     Comment: WEEKENDS- social    Drug use: Never       Family History   Problem Relation Age of Onset    No Known Problems Mother     No Known Problems Father        The following portions of the patient's history were reviewed and updated as appropriate: allergies, current medications, past family history, past medical history, past social history, past surgical history and problem list.      EXAM    Vitals:Blood pressure 130/90, pulse 78, temperature 97.6 °F (36.4 °C), temperature source Temporal, resp. rate  "20, height 5' 8\" (1.727 m), weight 103 kg (228 lb), SpO2 98%.,Body mass index is 34.67 kg/m².     Physical Exam  Vitals and nursing note reviewed. Exam conducted with a chaperone present (wife).   Constitutional:       General: He is not in acute distress.     Appearance: Normal appearance. He is not ill-appearing or diaphoretic.   Eyes:      General: No scleral icterus.        Right eye: No discharge.         Left eye: No discharge.      Extraocular Movements: Extraocular movements intact.   Pulmonary:      Effort: Pulmonary effort is normal. No respiratory distress.   Musculoskeletal:         General: Normal range of motion.      Cervical back: Normal range of motion.   Neurological:      General: No focal deficit present.      Mental Status: He is alert and oriented to person, place, and time.      Motor: Motor strength is normal.     Gait: Gait is intact.   Psychiatric:         Mood and Affect: Mood normal.         Behavior: Behavior normal.         Neurologic Exam     Mental Status   Oriented to person, place, and time.   Level of consciousness: alert    Motor Exam   Muscle bulk: decreased (right  shoulder girdle  right sided traps , sternocledomastiod  muscles --statis post cervical herniated disc.)  Overall muscle tone: normal    Strength   Strength 5/5 throughout.     Sensory Exam   Light touch normal.     Gait, Coordination, and Reflexes     Gait  Gait: normal      Imaging Studies  No results found.    I have personally reviewed pertinent reports.   and I have personally reviewed pertinent films in PACS    I have spent a total time of 35 minutes on 06/20/24 in caring for this patient including Diagnostic results, Risks and benefits of tx options, Instructions for management, Patient and family education, Importance of tx compliance, Risk factor reductions, Impressions, Counseling / Coordination of care, Documenting in the medical record, Reviewing / ordering tests, medicine, procedures  , Obtaining or " reviewing history  , and Communicating with other healthcare professionals .     PLEASE NOTE:  This encounter may have been completed utilizing the [employer] Speech Voice Recognition Software. Grammatical errors, random word insertions, pronoun errors and incomplete sentences are occasional consequences of the system due to software limitations, ambient noise and hardware issues.These may be missed even after proof reading prior to affixing electronic signature. Please do not hesitate to contact me directly if you have any questions or concerns about the content, text or information contained within the body of this dictation.

## 2024-06-20 NOTE — ASSESSMENT & PLAN NOTE
Addressed in HPI, strong hand family history of intracranial aneurysm.  Patient also describes a history of vertebral artery dissection and father and severe stenosis.  Aneurysm risk factors  HTN-no diagnosis, BP today 130/90.  HLD-no documented history  Nicotine use-3 to 4 cigarettes/day.      Plan  Ordered MRI angio of the brain without.  Patient will return to office as a follow-up joint appointment with Dr. Chang and I 3 days to 1 week after MRI completion.  Advised patient if imaging does not demonstrate an aneurysm or an aneurysm is of a nonsurgical size the follow-up appointment will be solo with me.  Reviewed in great detail aneurysm risk factors.  Advised of the need for ongoing assessment by PCP to ensure he is not diagnosed with risk factors and/or are appropriately managed.  Provided a broad overview research and aneurysm risk for rupture over a 1 to 5-year timeframe.  Reviewed with patient signs and symptoms of aneurysm rupture, and documenting in AVS  Advised patient if he has additional questions or concerns he should notify the neurosurgery office.  Discussed in detail healthy living to decrease risk for developing risk factors for aneurysm rupture as identified above, HTN and HLD.  Discussed in detail need for nicotine cessation.

## 2024-07-05 ENCOUNTER — HOSPITAL ENCOUNTER (OUTPATIENT)
Dept: MRI IMAGING | Facility: HOSPITAL | Age: 29
Discharge: HOME | End: 2024-07-05
Payer: COMMERCIAL

## 2024-07-05 DIAGNOSIS — I72.9 ANEURYSM (HCC): ICD-10-CM

## 2024-07-05 PROCEDURE — 70544 MR ANGIOGRAPHY HEAD W/O DYE: CPT

## 2024-07-12 ENCOUNTER — OFFICE VISIT (OUTPATIENT)
Dept: NEUROSURGERY | Facility: CLINIC | Age: 29
End: 2024-07-12
Payer: COMMERCIAL

## 2024-07-12 VITALS
SYSTOLIC BLOOD PRESSURE: 126 MMHG | RESPIRATION RATE: 18 BRPM | OXYGEN SATURATION: 98 % | WEIGHT: 228 LBS | TEMPERATURE: 97.7 F | BODY MASS INDEX: 34.56 KG/M2 | DIASTOLIC BLOOD PRESSURE: 80 MMHG | HEART RATE: 75 BPM | HEIGHT: 68 IN

## 2024-07-12 DIAGNOSIS — I72.9 ANEURYSM (HCC): Primary | ICD-10-CM

## 2024-07-12 DIAGNOSIS — Z82.49 FAMILY HISTORY OF INTRACRANIAL ANEURYSMS: ICD-10-CM

## 2024-07-12 PROCEDURE — 99214 OFFICE O/P EST MOD 30 MIN: CPT | Performed by: NURSE PRACTITIONER

## 2024-07-12 NOTE — PROGRESS NOTES
Ambulatory Visit  Name: Dakota Franco Jr.      : 1995      MRN: [id number]  Encounter Provider: Rodríguez Chang MD  Encounter Date: 2024   Encounter department: [facility] NEUROSURGICAL [facility]    Assessment & Plan   1. Aneurysm (HCC)  Assessment & Plan:  As addressed in HPI  Identified risk factors for aneurysm growth and rupture   HTN --no history  HLD --no history  Tobacco dependence --yes 3-4 per day , is working on cessation   Admits he has ongoing follow-up with PCP for risk factor modification, management of identified comorbid conditions.   Admits 2 or more first degree relatives diagnosed with known brain aneurysms or have  suddenly of an unknown cause or brain bleed-addressed in HPI he has a strong family history of intracranial hemorrhage, aunt, uncle, and recently his father vertebral artery aneurysm with dissection     Imagining   2025  MRA BRAIN WITHOUT CONTRAST  2 mm triangular-shaped outpouching projecting medially off of the right A1-A2 junction with a possible tiny vessel arising off of the apex. Findings may represent an infundibulum or aneurysm. Further initial evaluation with CTA of the [place] is  recommended as well as interval surveillance in this patient with a strong family history of intracranial aneurysms.  Imaging addressed in collaboration with Dr. hCang, no CTA indicated of the [place].  Will follow-up in 1 year with imaging surveillance MRA brain without contrast.  Care as per neurosurgical protocol for an unruptured intracranial aneurysm.       Plan   Discussed imagining result  with patient and wife  Reviewed care as per protocol for the management of iron ruptured intracranial aneurysm  Extensively discussed natural nature of aneurysms.    Discussed given current size and location risk of rupture is less than 1%/year per UCAS and <1%/5yrs per ISIUA.    Discussed modifiable risk factors including hypertension, hyperlipidemia,  and smoking--as addressed above in assessment  Discussed family history as addressed above in assessment?    Discussed signs and symptoms of aneurysm rupture including severe and sudden headache (WHOL), neck pain, nausea and vomiting, and seizure,visual disturbances, such as loss of vision or double vision, pain above or around your eye,numbness or weakness on 1 side of your face,difficulty speaking, loss of balance, difficulty concentrating or problems with short-term memory.   neck pain. Reiterated that the symptoms should prompt patient to visit in emergency department immediately.   Ordered f/u imagining MRA brain without due to 75 2025.   Checkout scheduled f/u appointment 3 days to 1 week post imagining completion as a joint with Dr. Chang and I.  Advised if he/she has additional questions or concerns to please contact the neurosurgery office.  AVS teaching aneurysm, hypertension, low-salt diet, and nicotine dependence provided at initial visit.    Orders:  -     MRI angiogram head without contrast; Future; Expected date: 07/05/2025  2. Family history of intracranial aneurysms  Assessment & Plan:  As addressed in HPI.  Orders:  -     MRI angiogram head without contrast; Future; Expected date: 07/05/2025      History of Present Illness     Dakota Franco Jr. is a 28 y.o. male who presents   Patient returns to office as a follow-up visit to initial consult visit on 6/20/2024 for consultation given strong family history of intracranial aneurysm with rupture and need for brain imaging, he was a self-referral.    Patient is referred to neurosurgery after father recently completed an office visit for aneurysm and vertebral artery dissection, family history reported 2 or more first-degree relatives with aneurysms.  As per provider documentation family members include 2 children- 1 boy and 1 girl    6 brothers and 1 sister   He Presents in neurosurgery initial consult OV for aneurysm surveillance , has 2 first degree  relatives with known ruptured aneurysms and stroke and aunt and unncle. He reports his father recently has a clot in and artery in his neck and now it is blocked off.    Uncle age 61  ruptured aneurysm with stroke, survived and underwent craniotomy surgical intervention.    He reports recently his aunt in 2024 age 72  entered hpospital as stroke discovered she had an aneurysm bleed and underwent repair, unknown procedure. Father vertebral artery aneyrysm  and recent dissection of vertebral artery cervical segment .  His father remains under neurosurgical care and has not undergone surgical intervention.  He returns today for review of MRI angio brain imaging    Review of Systems   Constitutional: Negative.    HENT: Negative.  Negative for trouble swallowing.    Eyes: Negative.  Negative for pain and visual disturbance.   Respiratory: Negative.     Cardiovascular: Negative.    Gastrointestinal: Negative.    Endocrine: Negative.    Genitourinary: Negative.  Negative for enuresis.   Musculoskeletal:  Negative for gait problem.   Skin: Negative.    Allergic/Immunologic: Negative.    Neurological:  Negative for dizziness, speech difficulty, weakness, light-headedness, numbness and headaches.   Hematological: Negative.  Does not bruise/bleed easily.   Psychiatric/Behavioral:  Negative for confusion, decreased concentration and sleep disturbance.      Pertinent Medical History         Medical History Reviewed by provider this encounter:       Past Medical History   Past Medical History:   Diagnosis Date    Acute appendicitis 03/12/2021    Anxiety     Anxiety attack     last assessed 02/18/15    Back complaints     Depression     Palpitations     last assessed 03/19/15    Psychiatric disorder     Reactive airway disease     last assessed 02/18/15    Syncope     last assessed 09/29/16    Tachyarrhythmia     last assessed 03/19/15     Past Surgical History:   Procedure Laterality Date    FRACTURE SURGERY      HIP SURGERY  "Right     MS LAPAROSCOPIC APPENDECTOMY N/A 3/12/2021    Procedure: APPENDECTOMY LAPAROSCOPIC;  Surgeon: Roger Ashford MD;  Location: [facility];  Service: General     Family History   Problem Relation Age of Onset    No Known Problems Mother     No Known Problems Father      Current Outpatient Medications on File Prior to Visit   Medication Sig Dispense Refill    sertraline (ZOLOFT) 50 mg tablet Take 1 tablet (50 mg total) by mouth daily 30 tablet 5    ondansetron (ZOFRAN-ODT) 4 mg disintegrating tablet Take 1 tablet (4 mg total) by mouth every 6 (six) hours as needed for nausea or vomiting (Patient not taking: Reported on 2023) 20 tablet 0     No current facility-administered medications on file prior to visit.   No Known Allergies   Current Outpatient Medications on File Prior to Visit   Medication Sig Dispense Refill    sertraline (ZOLOFT) 50 mg tablet Take 1 tablet (50 mg total) by mouth daily 30 tablet 5    ondansetron (ZOFRAN-ODT) 4 mg disintegrating tablet Take 1 tablet (4 mg total) by mouth every 6 (six) hours as needed for nausea or vomiting (Patient not taking: Reported on 2023) 20 tablet 0     No current facility-administered medications on file prior to visit.      Social History     Tobacco Use    Smoking status: Some Days     Current packs/day: 0.00     Types: Cigarettes     Start date: 2007     Last attempt to quit: 2022     Years since quittin.3    Smokeless tobacco: Current     Types: Chew   Vaping Use    Vaping status: Never Used   Substance and Sexual Activity    Alcohol use: Not Currently     Comment: WEEKENDS- social    Drug use: Never    Sexual activity: Yes     Objective     /80 (BP Location: Right arm, Patient Position: Sitting, Cuff Size: Standard)   Pulse 75   Temp 97.7 °F (36.5 °C) (Temporal)   Resp 18   Ht 5' 8\" (1.727 m)   Wt 103 kg (228 lb)   SpO2 98%   BMI 34.67 kg/m²     Physical Exam  Vitals and nursing note reviewed. Exam conducted with a " chaperone present (Wife).   Constitutional:       General: He is not in acute distress.     Appearance: He is not ill-appearing.   Eyes:      General: No scleral icterus.        Right eye: No discharge.         Left eye: No discharge.      Extraocular Movements: Extraocular movements intact.      Conjunctiva/sclera: Conjunctivae normal.   Musculoskeletal:         General: Normal range of motion.      Cervical back: Normal range of motion.   Skin:     General: Skin is warm and dry.   Neurological:      General: No focal deficit present.      Mental Status: He is alert and oriented to person, place, and time.      Gait: Gait is intact.   Psychiatric:         Mood and Affect: Mood normal.         Behavior: Behavior normal.         Neurologic Exam     Mental Status   Oriented to person, place, and time.   Level of consciousness: alert    Motor Exam   Muscle bulk: normal  Overall muscle tone: normal    Sensory Exam   Light touch normal.     Gait, Coordination, and Reflexes     Gait  Gait: normal             IMAGINING     7/5/2025  MRA BRAIN WITHOUT CONTRAST     INDICATION:  I72.9: Aneurysm of unspecified site     COMPARISON:  None.     TECHNIQUE:  Axial 3-D time-of-flight imaging with 3-D reconstructions performed without contrast.     IV Contrast:  Not administered.     FINDINGS:     IMAGE QUALITY:  Diagnostic.     ANATOMY     INTERNAL CAROTID ARTERIES:  Normal flow related signal of the distal cervical, petrous and cavernous segments of the internal carotid arteries.  Normal ICA terminus.     ANTERIOR CEREBRAL ARTERY CIRCULATION: There is a triangular-shaped outpouching projecting medially at the right A1-A2 junction. There may be a tiny vessel arising from the apex on series 202, image 23. This region measures 2 mm. The anterior cerebral   arteries are otherwise patent bilaterally.     MIDDLE CEREBRAL ARTERY CIRCULATION:  The M1 segment and middle cerebral artery branches demonstrate normal flow-related signal.      DISTAL VERTEBRAL ARTERIES:  Distal vertebral arteries are patent with a normal vertebrobasilar junction.  The posterior inferior cerebellar artery origins are normal.     BASILAR ARTERY:  Normal.     POSTERIOR CEREBRAL ARTERIES:  Both posterior cerebral arteries arises from the basilar tip.  Both arteries demonstrate normal flow-related enhancement. There is a left-sided posterior communicating artery.     IMPRESSION:   2 mm triangular-shaped outpouching projecting medially off of the right A1-A2 junction with a possible tiny vessel arising off of the apex. Findings may represent an infundibulum or aneurysm. Further initial evaluation with CTA of the [place] is   recommended as well as interval surveillance in this patient with a strong family history of intracranial aneurysms.                     Administrative Statements   I have spent a total time of 35 minutes in caring for this patient on the day of the visit/encounter including Diagnostic results, Risks and benefits of tx options, Instructions for management, Patient and family education, Importance of tx compliance, Risk factor reductions, Impressions, Counseling / Coordination of care, Documenting in the medical record, Reviewing / ordering tests, medicine, procedures  , Obtaining or reviewing history  , and Communicating with other healthcare professionals . Topics discussed with the patient / family include

## 2024-07-13 PROBLEM — I72.9 ANEURYSM (HCC): Status: ACTIVE | Noted: 2024-07-13

## 2024-07-13 NOTE — ASSESSMENT & PLAN NOTE
As addressed in HPI  Identified risk factors for aneurysm growth and rupture   HTN --no history  HLD --no history  Tobacco dependence --yes 3-4 per day , is working on cessation   Admits he has ongoing follow-up with PCP for risk factor modification, management of identified comorbid conditions.   Admits 2 or more first degree relatives diagnosed with known brain aneurysms or have  suddenly of an unknown cause or brain bleed-addressed in HPI he has a strong family history of intracranial hemorrhage, aunt, uncle, and recently his father vertebral artery aneurysm with dissection     Imagining   2025  MRA BRAIN WITHOUT CONTRAST  2 mm triangular-shaped outpouching projecting medially off of the right A1-A2 junction with a possible tiny vessel arising off of the apex. Findings may represent an infundibulum or aneurysm. Further initial evaluation with CTA of the [place] is  recommended as well as interval surveillance in this patient with a strong family history of intracranial aneurysms.  Imaging addressed in collaboration with Dr. Chang, no CTA indicated of the [place].  Will follow-up in 1 year with imaging surveillance MRA brain without contrast.  Care as per neurosurgical protocol for an unruptured intracranial aneurysm.       Plan   Discussed imagining result  with patient and wife  Reviewed care as per protocol for the management of iron ruptured intracranial aneurysm  Extensively discussed natural nature of aneurysms.    Discussed given current size and location risk of rupture is less than 1%/year per UCAS and <1%/5yrs per ISIUA.    Discussed modifiable risk factors including hypertension, hyperlipidemia, and smoking--as addressed above in assessment  Discussed family history as addressed above in assessment?    Discussed signs and symptoms of aneurysm rupture including severe and sudden headache (WHOL), neck pain, nausea and vomiting, and seizure,visual disturbances, such as loss of  vision or double vision, pain above or around your eye,numbness or weakness on 1 side of your face,difficulty speaking, loss of balance, difficulty concentrating or problems with short-term memory.   neck pain. Reiterated that the symptoms should prompt patient to visit in emergency department immediately.   Ordered f/u imagining MRA brain without due to 75 2025.   Checkout scheduled f/u appointment 3 days to 1 week post imagining completion as a joint with Dr. Chang and I.  Advised if he/she has additional questions or concerns to please contact the neurosurgery office.  AVS teaching aneurysm, hypertension, low-salt diet, and nicotine dependence provided at initial visit.

## 2024-07-23 DIAGNOSIS — F41.1 GENERALIZED ANXIETY DISORDER: ICD-10-CM

## 2024-07-23 NOTE — TELEPHONE ENCOUNTER
PT called in requesting medication refill of the following medication: sertraline (ZOLOFT) 50 mg tablet     Script going to the following pharmacy:  [address], [address] [phone]

## 2025-02-27 ENCOUNTER — OFFICE VISIT (OUTPATIENT)
Dept: FAMILY MEDICINE CLINIC | Facility: CLINIC | Age: 30
End: 2025-02-27
Payer: COMMERCIAL

## 2025-02-27 VITALS
WEIGHT: 223 LBS | DIASTOLIC BLOOD PRESSURE: 78 MMHG | BODY MASS INDEX: 33.8 KG/M2 | SYSTOLIC BLOOD PRESSURE: 112 MMHG | OXYGEN SATURATION: 96 % | TEMPERATURE: 99.2 F | HEART RATE: 99 BPM | HEIGHT: 68 IN

## 2025-02-27 DIAGNOSIS — J01.01 ACUTE RECURRENT MAXILLARY SINUSITIS: Primary | ICD-10-CM

## 2025-02-27 PROCEDURE — 99213 OFFICE O/P EST LOW 20 MIN: CPT | Performed by: FAMILY MEDICINE

## 2025-02-27 NOTE — LETTER
To whom it may concern:    Mr. Dakota FrancoJr. is an established patient of our medical practice.  He has been treated for a severe respiratory infection and is not able to work February 27, February 28,2025.  He may return to work arch 3, 2025 to his usual unrestricted duties.    Respectfully,    Ramone Martinez, DO

## 2025-02-27 NOTE — PROGRESS NOTES
"Name: Dakota Franco Jr.      : 1995      MRN: [id number]  Encounter Provider: Ramone Martinez DO  Encounter Date: 2025   Encounter department: [facility]  :  Assessment & Plan  Acute recurrent maxillary sinusitis               Depression Screening and Follow-up Plan: Patient was screened for depression during today's encounter. They screened negative with a PHQ-2 score of 0.      Tobacco Cessation Counseling: Tobacco cessation counseling was not provided. The patient is sincerely urged to quit consumption of tobacco. He is not ready to quit tobacco. Medication options and side effects of medication not discussed. Patient agreed to medication.       History of Present Illness   Dakota is here with is a recurrent maxillary sinusitis he has been sick for about a week sinus congestion headache no achy muscles or anything to suggest influenza    URI   Associated symptoms include headaches, sinus pain and a sore throat. Pertinent negatives include no coughing.     Review of Systems   Constitutional:  Positive for activity change and fatigue.   HENT:  Positive for sinus pressure, sinus pain and sore throat. Negative for dental problem.    Respiratory:  Negative for cough and shortness of breath.    Neurological:  Positive for headaches.       Objective   /78 (BP Location: Left arm, Patient Position: Sitting, Cuff Size: Large)   Pulse 99   Temp 99.2 °F (37.3 °C) (Temporal)   Ht 5' 8\" (1.727 m)   Wt 101 kg (223 lb)   SpO2 96%   BMI 33.91 kg/m²      Physical Exam  Constitutional:       Appearance: Normal appearance.   HENT:      Nose: Congestion and rhinorrhea present.      Mouth/Throat:      Pharynx: Posterior oropharyngeal erythema present.   Cardiovascular:      Rate and Rhythm: Normal rate and regular rhythm.   Pulmonary:      Effort: Pulmonary effort is normal.      Breath sounds: Normal breath sounds.   Neurological:      Mental Status: He is alert.         "

## 2025-03-11 ENCOUNTER — OFFICE VISIT (OUTPATIENT)
Dept: FAMILY MEDICINE CLINIC | Facility: CLINIC | Age: 30
End: 2025-03-11
Payer: COMMERCIAL

## 2025-03-11 VITALS
BODY MASS INDEX: 34.56 KG/M2 | WEIGHT: 228 LBS | OXYGEN SATURATION: 96 % | HEIGHT: 68 IN | RESPIRATION RATE: 17 BRPM | SYSTOLIC BLOOD PRESSURE: 120 MMHG | HEART RATE: 65 BPM | TEMPERATURE: 97.5 F | DIASTOLIC BLOOD PRESSURE: 78 MMHG

## 2025-03-11 DIAGNOSIS — K64.8 INTERNAL HEMORRHOID, BLEEDING: Primary | ICD-10-CM

## 2025-03-11 PROCEDURE — 99213 OFFICE O/P EST LOW 20 MIN: CPT | Performed by: FAMILY MEDICINE

## 2025-03-11 NOTE — PROGRESS NOTES
"Assessment/Plan:    Problem List Items Addressed This Visit    None  Visit Diagnoses         Internal hemorrhoid, bleeding    -  Primary             Diagnoses and all orders for this visit:    Internal hemorrhoid, bleeding        No problem-specific Assessment & Plan notes found for this encounter.        Subjective:      Patient ID: Dakota Franco Jr. is a 29 y.o. male.    Dakota is here today chief complaint is that he has had some bright red rectal bleeding and he has a palpable mass in the rectal area which I believe is an internal hemorrhoid        The following portions of the patient's history were reviewed and updated as appropriate:   He has a past medical history of Acute appendicitis (03/12/2021), Anxiety, Anxiety attack, Back complaints, Depression, Palpitations, Psychiatric disorder, Reactive airway disease, Syncope, and Tachyarrhythmia.,  does not have any pertinent problems on file.,   has a past surgical history that includes Hip surgery (Right); Fracture surgery; and pr laparoscopic appendectomy (N/A, 3/12/2021).,  family history includes Aneurysm in his father; No Known Problems in his mother.,   reports that he quit smoking about 3 years ago. His smoking use included cigarettes. He started smoking about 18 years ago. He has quit using smokeless tobacco.  His smokeless tobacco use included chew. He reports that he does not currently use alcohol. He reports that he does not use drugs.,  has no known allergies..  Current Outpatient Medications   Medication Sig Dispense Refill    sertraline (ZOLOFT) 50 mg tablet Take 1 tablet (50 mg total) by mouth daily 30 tablet 5     No current facility-administered medications for this visit.       Review of Systems      Objective:  Vitals:    03/11/25 1543   BP: 120/78   BP Location: Left arm   Patient Position: Sitting   Cuff Size: Standard   Pulse: 65   Resp: 17   Temp: 97.5 °F (36.4 °C)   TempSrc: Temporal   SpO2: 96%   Weight: 103 kg (228 lb)   Height: 5' 8\" " (1.727 m)     Body mass index is 34.67 kg/m².     Physical Exam  Constitutional:       General: He is not in acute distress.     Appearance: Normal appearance. He is well-developed. He is not diaphoretic.   HENT:      Head: Normocephalic.      Right Ear: External ear normal.      Left Ear: External ear normal.      Nose: Nose normal.   Eyes:      General: No scleral icterus.        Right eye: No discharge.         Left eye: No discharge.      Conjunctiva/sclera: Conjunctivae normal.      Pupils: Pupils are equal, round, and reactive to light.   Neck:      Thyroid: No thyromegaly.      Trachea: No tracheal deviation.   Cardiovascular:      Rate and Rhythm: Normal rate and regular rhythm.      Heart sounds: Normal heart sounds. No murmur heard.     No friction rub. No gallop.   Pulmonary:      Effort: Pulmonary effort is normal. No respiratory distress.      Breath sounds: Normal breath sounds. No wheezing.   Abdominal:      General: Bowel sounds are normal.      Palpations: Abdomen is soft. There is no mass.      Tenderness: There is no abdominal tenderness. There is no guarding.   Genitourinary:     Comments: Internal hemorrhoid  Musculoskeletal:         General: No deformity.      Cervical back: Normal range of motion.   Lymphadenopathy:      Cervical: No cervical adenopathy.   Skin:     General: Skin is warm and dry.      Findings: No erythema or rash.   Neurological:      Mental Status: He is alert and oriented to person, place, and time.      Cranial Nerves: No cranial nerve deficit.   Psychiatric:         Thought Content: Thought content normal.

## 2025-07-19 ENCOUNTER — HOSPITAL ENCOUNTER (OUTPATIENT)
Dept: MRI IMAGING | Facility: HOSPITAL | Age: 30
Discharge: HOME | End: 2025-07-19
Attending: NURSE PRACTITIONER
Payer: COMMERCIAL

## 2025-07-19 DIAGNOSIS — Z82.49 FAMILY HISTORY OF INTRACRANIAL ANEURYSMS: ICD-10-CM

## 2025-07-19 DIAGNOSIS — I72.9 ANEURYSM (HCC): ICD-10-CM

## 2025-07-19 PROCEDURE — 70544 MR ANGIOGRAPHY HEAD W/O DYE: CPT

## (undated) DEVICE — ENDOPATH ETS45 2.5MM RELOADS (VASCULAR/THIN): Brand: ENDOPATH

## (undated) DEVICE — IRRIG ENDO FLO TUBING

## (undated) DEVICE — SCD SEQUENTIAL COMPRESSION COMFORT SLEEVE MEDIUM KNEE LENGTH: Brand: KENDALL SCD

## (undated) DEVICE — DRAPE EQUIPMENT RF WAND

## (undated) DEVICE — TRAY FOLEY 16FR URIMETER SURESTEP

## (undated) DEVICE — TROCAR: Brand: KII FIOS FIRST ENTRY

## (undated) DEVICE — 1820 FOAM BLOCK NEEDLE COUNTER: Brand: DEVON

## (undated) DEVICE — 5 MM CURVED DISSECTORS WITH MONOPOLAR CAUTERY: Brand: ENDOPATH

## (undated) DEVICE — ENDOPATH 5 MM GRASPERS WITH RATCHET HANDLES: Brand: ENDOPATH

## (undated) DEVICE — 3M™ TEGADERM™ TRANSPARENT FILM DRESSING FRAME STYLE, 1624W, 2-3/8 IN X 2-3/4 IN (6 CM X 7 CM), 100/CT 4CT/CASE: Brand: 3M™ TEGADERM™

## (undated) DEVICE — INTENDED FOR TISSUE SEPARATION, AND OTHER PROCEDURES THAT REQUIRE A SHARP SURGICAL BLADE TO PUNCTURE OR CUT.: Brand: BARD-PARKER SAFETY BLADES SIZE 15, STERILE

## (undated) DEVICE — [HIGH FLOW INSUFFLATOR,  DO NOT USE IF PACKAGE IS DAMAGED,  KEEP DRY,  KEEP AWAY FROM SUNLIGHT,  PROTECT FROM HEAT AND RADIOACTIVE SOURCES.]: Brand: PNEUMOSURE

## (undated) DEVICE — SUT MONOCRYL 4-0 PS-2 27 IN Y426H

## (undated) DEVICE — ANTI-FOG SOLUTION WITH FOAM PAD: Brand: DEVON

## (undated) DEVICE — HARMONIC 1100 SHEARS, 36CM SHAFT LENGTH: Brand: HARMONIC

## (undated) DEVICE — GENERAL ENDOSCOPY PACK: Brand: CONVERTORS

## (undated) DEVICE — GLOVE SRG BIOGEL 7

## (undated) DEVICE — ENDOPOUCH RETRIEVER SPECIMEN RETRIEVAL BAGS: Brand: ENDOPOUCH RETRIEVER

## (undated) DEVICE — GLOVE INDICATOR PI UNDERGLOVE SZ 7.5 BLUE

## (undated) DEVICE — NEEDLE 25G X 1 1/2

## (undated) DEVICE — SYRINGE 10ML LL

## (undated) DEVICE — SKIN MARKER DUAL TIP WITH RULER CAP, FLEXIBLE RULER AND LABELS: Brand: DEVON

## (undated) DEVICE — ADHESIVE SKIN HIGH VISCOSITY EXOFIN 1ML

## (undated) DEVICE — TROCAR: Brand: KII® SLEEVE

## (undated) DEVICE — TRANSPOSAL ULTRAFLEX DUO/QUAD ULTRA CART MANIFOLD

## (undated) DEVICE — SPONGE 4 X 4 XRAY 16 PLY STRL LF RFD

## (undated) DEVICE — SUT VICRYL 0 UR-6 27 IN J603H

## (undated) DEVICE — TROCARS: Brand: KII® BALLOON BLUNT TIP SYSTEM

## (undated) DEVICE — SPONGE LAP 18 X 18 IN STRL RFD

## (undated) DEVICE — ENDOPATH ETS-FLEX45 ARTICULATING ENDOSCOPIC LINEAR CUTTER, NO RELOAD: Brand: ENDOPATH

## (undated) DEVICE — INTENDED FOR TISSUE SEPARATION, AND OTHER PROCEDURES THAT REQUIRE A SHARP SURGICAL BLADE TO PUNCTURE OR CUT.: Brand: BARD-PARKER SAFETY BLADES SIZE 11, STERILE

## (undated) DEVICE — LIGHT GLOVE GREEN

## (undated) DEVICE — GAUZE SPONGES,8 PLY: Brand: CURITY

## (undated) DEVICE — CHLORAPREP HI-LITE 26ML ORANGE

## (undated) DEVICE — 3M™ STERI-STRIP™ REINFORCED ADHESIVE SKIN CLOSURES, R1546, 1/4 IN X 4 IN (6 MM X 100 MM), 10 STRIPS/ENVELOPE: Brand: 3M™ STERI-STRIP™

## (undated) DEVICE — ADHESIVE SKN CLSR HISTOACRYL FLEX 0.5ML LF